# Patient Record
Sex: FEMALE | Race: WHITE | NOT HISPANIC OR LATINO | Employment: FULL TIME | URBAN - METROPOLITAN AREA
[De-identification: names, ages, dates, MRNs, and addresses within clinical notes are randomized per-mention and may not be internally consistent; named-entity substitution may affect disease eponyms.]

---

## 2017-06-12 DIAGNOSIS — F41.8 DEPRESSION WITH ANXIETY: ICD-10-CM

## 2017-06-12 DIAGNOSIS — E78.00 PURE HYPERCHOLESTEROLEMIA: ICD-10-CM

## 2017-06-12 DIAGNOSIS — E89.0 HYPOTHYROIDISM, POSTSURGICAL: ICD-10-CM

## 2017-06-12 RX ORDER — LIOTHYRONINE SODIUM 5 UG/1
TABLET ORAL
Qty: 240 TAB | Refills: 0 | Status: SHIPPED | OUTPATIENT
Start: 2017-06-12 | End: 2017-07-31 | Stop reason: SDUPTHER

## 2017-06-12 RX ORDER — LEVOTHYROXINE SODIUM 0.15 MG/1
TABLET ORAL
Qty: 30 TAB | Refills: 0 | Status: SHIPPED | OUTPATIENT
Start: 2017-06-12 | End: 2017-07-31 | Stop reason: SDUPTHER

## 2017-06-13 NOTE — TELEPHONE ENCOUNTER
Please let patient know that she is due for lab and follow up in clinic. I will refill 30 days supply on Levothyroxine and Liothyronine. Please advise her to do fasting lab and follow up in clinic. We may need to adjust the dose of medications based on her recent blood tests.    I replaced the fasting blood tests on 17 as her previous blood tests orders are .     Thanks!    Lavern Solis M.D.

## 2017-06-13 NOTE — TELEPHONE ENCOUNTER
Phone Number Called: [phone] (home)     Message: called pt. Notified of information below, pt will call to schedule appt. After she has blood work done.     Left Message for patient to call back: no

## 2017-07-10 RX ORDER — LIOTHYRONINE SODIUM 5 UG/1
TABLET ORAL
Qty: 240 TAB | Refills: 0 | OUTPATIENT
Start: 2017-07-10

## 2017-07-10 NOTE — TELEPHONE ENCOUNTER
Refill declined. Please advise her to do fasting blood tests before follow up. Lab was reordered on 6/12/17.   Please advise pt and pharmacy.  She is past due for labs and appt.    Lavern Solis MD  [facility] [address] [place] [address]

## 2017-07-10 NOTE — TELEPHONE ENCOUNTER
Phone Number Called: [phone] (home)     Message: called pt left message for pt to call back.     Left Message for patient to call back: no

## 2017-07-14 ENCOUNTER — HOSPITAL ENCOUNTER (OUTPATIENT)
Dept: LAB | Facility: MEDICAL CENTER | Age: 52
End: 2017-07-14
Attending: INTERNAL MEDICINE
Payer: COMMERCIAL

## 2017-07-14 DIAGNOSIS — F41.8 DEPRESSION WITH ANXIETY: ICD-10-CM

## 2017-07-14 DIAGNOSIS — E78.00 PURE HYPERCHOLESTEROLEMIA: ICD-10-CM

## 2017-07-14 DIAGNOSIS — E89.0 HYPOTHYROIDISM, POSTSURGICAL: ICD-10-CM

## 2017-07-14 LAB
ALBUMIN SERPL BCP-MCNC: 4.2 G/DL (ref 3.2–4.9)
ALBUMIN/GLOB SERPL: 1.4 G/DL
ALP SERPL-CCNC: 50 U/L (ref 30–99)
ALT SERPL-CCNC: 17 U/L (ref 2–50)
ANION GAP SERPL CALC-SCNC: 8 MMOL/L (ref 0–11.9)
AST SERPL-CCNC: 17 U/L (ref 12–45)
BASOPHILS # BLD AUTO: 1.4 % (ref 0–1.8)
BASOPHILS # BLD: 0.08 K/UL (ref 0–0.12)
BILIRUB SERPL-MCNC: 0.7 MG/DL (ref 0.1–1.5)
BUN SERPL-MCNC: 12 MG/DL (ref 8–22)
CALCIUM SERPL-MCNC: 9.1 MG/DL (ref 8.5–10.5)
CHLORIDE SERPL-SCNC: 104 MMOL/L (ref 96–112)
CHOLEST SERPL-MCNC: 235 MG/DL (ref 100–199)
CO2 SERPL-SCNC: 24 MMOL/L (ref 20–33)
CREAT SERPL-MCNC: 0.75 MG/DL (ref 0.5–1.4)
EOSINOPHIL # BLD AUTO: 0.14 K/UL (ref 0–0.51)
EOSINOPHIL NFR BLD: 2.4 % (ref 0–6.9)
ERYTHROCYTE [DISTWIDTH] IN BLOOD BY AUTOMATED COUNT: 39.8 FL (ref 35.9–50)
GFR SERPL CREATININE-BSD FRML MDRD: >60 ML/MIN/1.73 M 2
GLOBULIN SER CALC-MCNC: 3 G/DL (ref 1.9–3.5)
GLUCOSE SERPL-MCNC: 101 MG/DL (ref 65–99)
HCT VFR BLD AUTO: 44.3 % (ref 37–47)
HDLC SERPL-MCNC: 48 MG/DL
HGB BLD-MCNC: 14.5 G/DL (ref 12–16)
IMM GRANULOCYTES # BLD AUTO: 0.03 K/UL (ref 0–0.11)
IMM GRANULOCYTES NFR BLD AUTO: 0.5 % (ref 0–0.9)
LDLC SERPL CALC-MCNC: 163 MG/DL
LYMPHOCYTES # BLD AUTO: 1.54 K/UL (ref 1–4.8)
LYMPHOCYTES NFR BLD: 26.1 % (ref 22–41)
MCH RBC QN AUTO: 27 PG (ref 27–33)
MCHC RBC AUTO-ENTMCNC: 32.7 G/DL (ref 33.6–35)
MCV RBC AUTO: 82.3 FL (ref 81.4–97.8)
MONOCYTES # BLD AUTO: 0.47 K/UL (ref 0–0.85)
MONOCYTES NFR BLD AUTO: 8 % (ref 0–13.4)
NEUTROPHILS # BLD AUTO: 3.63 K/UL (ref 2–7.15)
NEUTROPHILS NFR BLD: 61.6 % (ref 44–72)
NRBC # BLD AUTO: 0 K/UL
NRBC BLD AUTO-RTO: 0 /100 WBC
PLATELET # BLD AUTO: 298 K/UL (ref 164–446)
PMV BLD AUTO: 10.5 FL (ref 9–12.9)
POTASSIUM SERPL-SCNC: 3.9 MMOL/L (ref 3.6–5.5)
PROT SERPL-MCNC: 7.2 G/DL (ref 6–8.2)
RBC # BLD AUTO: 5.38 M/UL (ref 4.2–5.4)
SODIUM SERPL-SCNC: 136 MMOL/L (ref 135–145)
T4 FREE SERPL-MCNC: <0.25 NG/DL (ref 0.53–1.43)
TRIGL SERPL-MCNC: 119 MG/DL (ref 0–149)
TSH SERPL DL<=0.005 MIU/L-ACNC: 5.74 UIU/ML (ref 0.3–3.7)
WBC # BLD AUTO: 5.9 K/UL (ref 4.8–10.8)

## 2017-07-14 PROCEDURE — 80053 COMPREHEN METABOLIC PANEL: CPT

## 2017-07-14 PROCEDURE — 85025 COMPLETE CBC W/AUTO DIFF WBC: CPT

## 2017-07-14 PROCEDURE — 80061 LIPID PANEL: CPT

## 2017-07-14 PROCEDURE — 36415 COLL VENOUS BLD VENIPUNCTURE: CPT

## 2017-07-14 PROCEDURE — 84439 ASSAY OF FREE THYROXINE: CPT

## 2017-07-14 PROCEDURE — 84443 ASSAY THYROID STIM HORMONE: CPT

## 2017-07-17 ENCOUNTER — APPOINTMENT (OUTPATIENT)
Dept: MEDICAL GROUP | Age: 52
End: 2017-07-17
Payer: COMMERCIAL

## 2017-07-31 ENCOUNTER — OFFICE VISIT (OUTPATIENT)
Dept: MEDICAL GROUP | Age: 52
End: 2017-07-31
Payer: COMMERCIAL

## 2017-07-31 VITALS
HEIGHT: 69 IN | TEMPERATURE: 98.1 F | WEIGHT: 273 LBS | DIASTOLIC BLOOD PRESSURE: 74 MMHG | OXYGEN SATURATION: 96 % | SYSTOLIC BLOOD PRESSURE: 126 MMHG | BODY MASS INDEX: 40.43 KG/M2 | HEART RATE: 69 BPM

## 2017-07-31 DIAGNOSIS — E89.0 HYPOTHYROIDISM, POSTSURGICAL: ICD-10-CM

## 2017-07-31 DIAGNOSIS — F41.8 DEPRESSION WITH ANXIETY: ICD-10-CM

## 2017-07-31 DIAGNOSIS — Z91.199 NONCOMPLIANCE: ICD-10-CM

## 2017-07-31 DIAGNOSIS — Z12.11 SCREENING FOR COLON CANCER: ICD-10-CM

## 2017-07-31 DIAGNOSIS — E66.01 MORBID OBESITY WITH BMI OF 40.0-44.9, ADULT (HCC): ICD-10-CM

## 2017-07-31 DIAGNOSIS — Z12.31 VISIT FOR SCREENING MAMMOGRAM: ICD-10-CM

## 2017-07-31 DIAGNOSIS — R73.01 IFG (IMPAIRED FASTING GLUCOSE): ICD-10-CM

## 2017-07-31 DIAGNOSIS — E78.00 PURE HYPERCHOLESTEROLEMIA: ICD-10-CM

## 2017-07-31 PROCEDURE — 99214 OFFICE O/P EST MOD 30 MIN: CPT | Performed by: INTERNAL MEDICINE

## 2017-07-31 RX ORDER — LEVOTHYROXINE SODIUM 175 UG/1
175 TABLET ORAL
Qty: 30 TAB | Refills: 6 | Status: SHIPPED | OUTPATIENT
Start: 2017-07-31 | End: 2018-09-17

## 2017-07-31 RX ORDER — BUPROPION HYDROCHLORIDE 150 MG/1
150 TABLET ORAL 2 TIMES DAILY
Qty: 60 TAB | Refills: 6 | Status: SHIPPED | OUTPATIENT
Start: 2017-07-31 | End: 2020-12-31 | Stop reason: SDUPTHER

## 2017-07-31 RX ORDER — LIOTHYRONINE SODIUM 5 UG/1
TABLET ORAL
Qty: 150 TAB | Refills: 6 | Status: SHIPPED | OUTPATIENT
Start: 2017-07-31 | End: 2018-05-07

## 2017-07-31 ASSESSMENT — PAIN SCALES - GENERAL: PAINLEVEL: NO PAIN

## 2017-07-31 ASSESSMENT — PATIENT HEALTH QUESTIONNAIRE - PHQ9: CLINICAL INTERPRETATION OF PHQ2 SCORE: 0

## 2017-07-31 NOTE — ASSESSMENT & PLAN NOTE
Patient is taking Wellbutrin  mg twice a day. She stated that her mood is well controlled with current regimens. She denies side effects from taking it. She denies suicide ideation or plan or homicidal ideation or plan. She requested to refill her thyroid medications and Wellbutrin.

## 2017-07-31 NOTE — ASSESSMENT & PLAN NOTE
Patient gain weight about 10 pounds, compared to last year. She stated that she eats very healthy and she exercises irregularly. We discussed healthy diet and regular exercise. I also offered her to see nutritionist. However patient declined to refer to nutritionist or weight management program.

## 2017-07-31 NOTE — PROGRESS NOTES
Subjective:   Brielle Krishnamurthy is a 51 y.o. female here today for evaluation and management of:      Pure hypercholesterolemia  Patient has high total cholesterol and LDL cholesterol. She stated that her cholesterol is high because of the hypothyroid. She is not interested to be treated with any cholesterol medication. She tried to exercise, but not very regular. She also states that she eats very healthy. She wants to control her cholesterol with diet and exercise only. I reviewed her recent blood tests with her today.    Results for BRIELLE STEWARD (MRN [id number]) as of 7/31/2017 08:15   Ref. Range 3/4/2015 07:45 7/14/2017 09:33   Cholesterol,Tot Latest Ref Range: 100-199 mg/dL 227 (H) 235 (H)   Triglycerides Latest Ref Range: 0-149 mg/dL 53 119   HDL Latest Ref Range: >=40 mg/dL 56 48   LDL Latest Ref Range: <100 mg/dL 160 (H) 163 (H)       Morbid obesity with BMI of 40.0-44.9, adult (HCC)  Patient gain weight about 10 pounds, compared to last year. She stated that she eats very healthy and she exercises irregularly. We discussed healthy diet and regular exercise. I also offered her to see nutritionist. However patient declined to refer to nutritionist or weight management program.    Hypothyroidism, postsurgical  Patient is taking levothyroxine 150 µg every morning and Cytomel 5 micrograms 5 tablets a day. She states that she takes all thyroid medication fairly regularly. She denies side effects from taking levothyroxine and Cytomel. She has slightly elevated TSH and low free T4. I reviewed her recent thyroid function tests with her.  She reported gaining weight and feeling tired.    Results for BRIELLE STEWARD (MRN [id number]) as of 7/31/2017 08:15   Ref. Range 3/4/2015 07:45 7/14/2017 09:33   TSH Latest Ref Range: 0.300-3.700 uIU/mL 24.290 (H) 5.740 (H)   Free T-4 Latest Ref Range: 0.53-1.43 ng/dL 0.60 <0.25 (L)       Depression with anxiety  Patient is taking Wellbutrin  mg twice a day. She  "stated that her mood is well controlled with current regimens. She denies side effects from taking it. She denies suicide ideation or plan or homicidal ideation or plan. She requested to refill her thyroid medications and Wellbutrin.    Noncompliance  Patient is noncompliance with follow-up, blood test and medications.            Current medicines (including changes today)  Current Outpatient Prescriptions   Medication Sig Dispense Refill   • levothyroxine (SYNTHROID) 175 MCG Tab Take 1 Tab by mouth Every morning on an empty stomach. 30 Tab 6   • buPROPion (WELLBUTRIN XL) 150 MG XL tablet Take 1 Tab by mouth 2 Times a Day. TWICE DAILY 60 Tab 6   • liothyronine (CYTOMEL) 5 MCG Tab Take 2 tabs in am and 3 tabs in pm. 150 Tab 6   • vitamin A 49960 UNIT capsule Take 10,000 Units by mouth every day.     • Calcium Carbonate-Vit D-Min (CALCIUM 1200 PO) Take  by mouth every day.     • Magnesium 400 MG CAPS Take  by mouth every day.     • Cholecalciferol (VITAMIN D-3) 5000 UNITS TABS Take 2 Tabs by mouth every day.       No current facility-administered medications for this visit.     She  has a past medical history of HTN (hypertension) (07-29-11); Anemia; Other specified symptom associated with female genital organs; Clostridium difficile infection (2011); Other specified disorder of intestines; and Hypothyroidism, postsurgical (2011). She also has no past medical history of Breast cancer (CMS-HCC) or Anesthesia.    ROS   No chest pain, no shortness of breath, no abdominal pain       Objective:     Blood pressure 126/74, pulse 69, temperature 36.7 °C (98.1 °F), height 1.753 m (5' 9\"), weight 123.832 kg (273 lb), SpO2 96 %, not currently breastfeeding. Body mass index is 40.3 kg/(m^2).   Physical Exam:  General: Alert, oriented and no acute distress.  Eye contact is good, speech goal directed, affect calm  HEENT: conjunctiva non-injected, sclera non-icteric.  Oral mucous membranes pink and moist with no lesions.  Pinna " normal.   Lungs: Normal respiratory effort, clear to auscultation bilaterally with good excursion.  CV: regular rate and rhythm. No murmurs.  Abdomen: soft, non distended, nontender, Bowel sound normal.  Ext: no edema, color normal, vascularity normal, temperature normal        Assessment and Plan:   The following treatment plan was discussed     1. Pure hypercholesterolemia  - Not on medications. Patient does not want to take any prescription medication. She wants to control her cholesterol with diet and exercise as she can.   - We discussed to try over-the-counter red yeast rice, and omega-3.   - Advised to eat low fat, low carbohydrate and high fiber diet as well as do cardio physical exercise regularly.   - COMP METABOLIC PANEL; Future  - LIPID PROFILE; Future    2. Hypothyroidism, postsurgical  - Not well controlled. Increase levothyroxine from 150 µg to 175 µg every morning and continue Cytomel 5 µg, 5 tablets daily. Reviewed the potential side effect of levothyroxine cytoma with patient.  - Recheck labs in 3 months  - levothyroxine (SYNTHROID) 175 MCG Tab; Take 1 Tab by mouth Every morning on an empty stomach.  Dispense: 30 Tab; Refill: 6  - liothyronine (CYTOMEL) 5 MCG Tab; Take 2 tabs in am and 3 tabs in pm.  Dispense: 150 Tab; Refill: 6  - COMP METABOLIC PANEL; Future  - TSH; Future  - FREE THYROXINE; Future    3. Depression with anxiety  - Well-controlled. Patient wants to refill Wellbutrin today.  - Discussed with patient regarding the use and side effects of medication as well as black box warning of suicidality risk with medication. Patient verbally understands. Recommend to call 911 or go to ER if patient has suicidal ideation or plan.   - buPROPion (WELLBUTRIN XL) 150 MG XL tablet; Take 1 Tab by mouth 2 Times a Day. TWICE DAILY  Dispense: 60 Tab; Refill: 6    4. Morbid obesity with BMI of 40.0-44.9, adult (CMS-[facility])  - Counseled for healthy diet and regular physical exercise to lose weight.   -  Patient declined to refer to nutritionist and weight management program    5. Visit for screening mammogram  - Patient is overdue for screening mammogram. Order screening mammogram  - MA-SCREEN MAMMO W/CAD-BILAT; Future    6. IFG (impaired fasting glucose)  - Discussed to eat low carbohydrate and low sugar diet. Encourage regular physical exercise. Patient has family history of diabetes on her aunt.  - COMP METABOLIC PANEL; Future  - HEMOGLOBIN A1C; Future    7. Screening for colon cancer  - Refer to GI for colon cancer screening.  - REFERRAL TO GASTROENTEROLOGY    8. Noncompliance  - Counseling to compliant with medication, diet and exercise as well as blood tests and follow-up.    9. Health maintenance:  - Discussed to stop doing Pap smear as patient had complete hysterectomy in 2014 for uterine fibroids and heavy bleeding, and patient stated that she had normal Pap smear all the time in the past. Patient agreed to stop doing Pap smear. Patient is advised to do annual women health and pelvic exam. She will follow with gynecologist for pelvic exam. Patient is also advised to seek urgent medical attention if she has any lesions on vaginal or genitalia or vaginal discharge or bleeding or spotting. Patient agrees with the plan.  - Ordered annual screening mammogram.  - Referred to GI for screening colon cancer.      Followup: Return in about 3 months (around 10/31/2017), or if symptoms worsen or fail to improve, for hypothyroid, depression with anxiety, hypercholesterolemia, obesity, lab review.      Please note that this dictation was created using voice recognition software. I have made every reasonable attempt to correct obvious errors, but I expect that there may have unintended errors in text, spelling, punctuation, or grammar that I did not discover.

## 2017-07-31 NOTE — ASSESSMENT & PLAN NOTE
Patient is taking levothyroxine 150 µg every morning and Cytomel 5 micrograms 5 tablets a day. She states that she takes all thyroid medication fairly regularly. She denies side effects from taking levothyroxine and Cytomel. She has slightly elevated TSH and low free T4. I reviewed her recent thyroid function tests with her.  She reported gaining weight and feeling tired.    Results for NADEEM STEWARD (MRN [id number]) as of 7/31/2017 08:15   Ref. Range 3/4/2015 07:45 7/14/2017 09:33   TSH Latest Ref Range: 0.300-3.700 uIU/mL 24.290 (H) 5.740 (H)   Free T-4 Latest Ref Range: 0.53-1.43 ng/dL 0.60 <0.25 (L)

## 2017-07-31 NOTE — MR AVS SNAPSHOT
"        Brielle Estrada Anisha   2017 8:00 AM   Office Visit   MRN: [id number]    Department:  [address] [place] [address]   Dept Phone:  [phone]    Description:  Female : 1965   Provider:  Lavern Solis M.D.           Reason for Visit     Hypothyroidism lab review      Allergies as of 2017     No Known Allergies      You were diagnosed with     Pure hypercholesterolemia   [272.0.ICD-9-CM]       Hypothyroidism, postsurgical   [408693]       Depression with anxiety   [459222]       Morbid obesity with BMI of 40.0-44.9, adult (CMS-[facility])   [518844]       Visit for screening mammogram   [180928]       IFG (impaired fasting glucose)   [941061]       Screening for colon cancer   [911148]         Vital Signs     Blood Pressure Pulse Temperature Height Weight Body Mass Index    126/74 mmHg 69 36.7 °C (98.1 °F) 1.753 m (5' 9\") 123.832 kg (273 lb) 40.30 kg/m2    Oxygen Saturation Breastfeeding? Smoking Status             96% No Never Smoker          Basic Information     Date Of Birth Sex Race Ethnicity Preferred Language    1965 Female White Non- English      Your appointments     Oct 30, 2017  8:00 AM   Established Patient with Lavern Solis M.D.   [address] [address] [phone]           You will be receiving a confirmation call a few days before your appointment from our automated call confirmation system.              Problem List              ICD-10-CM Priority Class Noted - Resolved    Cervical radiculopathy M54.12   2012 - Present    Hypothyroidism, postsurgical E89.0   Unknown - Present    History of chlamydia infection Z86.19   2012 - Present    Depression with anxiety F41.8   2013 - Present    Intramural leiomyoma of uterus D25.1   2014 - Present    Pure hypercholesterolemia E78.00   3/13/2016 - Present    Morbid obesity with BMI of 40.0-44.9, adult ([facility]) E66.01, Z68.41   2017 - Present      "   Health Maintenance        Date Due Completion Dates    IMM DTaP/Tdap/Td Vaccine (1 - Tdap) 10/13/2011 10/12/2011    MAMMOGRAM 7/22/2012 7/22/2011    COLONOSCOPY 8/23/2016 ---    IMM INFLUENZA (1) 9/1/2017 ---            Current Immunizations     TD Vaccine 10/12/2011  6:17 PM      Below and/or attached are the medications your provider expects you to take. Review all of your home medications and newly ordered medications with your provider and/or pharmacist. Follow medication instructions as directed by your provider and/or pharmacist. Please keep your medication list with you and share with your provider. Update the information when medications are discontinued, doses are changed, or new medications (including over-the-counter products) are added; and carry medication information at all times in the event of emergency situations     Allergies:  No Known Allergies          Medications  Valid as of: July 31, 2017 -  8:20 AM    Generic Name Brand Name Tablet Size Instructions for use    BuPROPion HCl (TABLET SR 24 HR) WELLBUTRIN  MG Take 1 Tab by mouth 2 Times a Day. TWICE DAILY        Calcium Carbonate-Vit D-Min   Take  by mouth every day.        Cholecalciferol (Tab) Vitamin D-3 5000 UNITS Take 2 Tabs by mouth every day.        Levothyroxine Sodium (Tab) SYNTHROID 175 MCG Take 1 Tab by mouth Every morning on an empty stomach.        Liothyronine Sodium (Tab) CYTOMEL 5 MCG Take 2 tabs in am and 3 tabs in pm.        Magnesium (Cap) Magnesium 400 MG Take  by mouth every day.        Vitamin A (Cap) vitamin A 87766 UNIT Take 10,000 Units by mouth every day.        .                 Medicines prescribed today were sent to:     [id number] - [place] - [address] [address] [facility] AT [facility]    [address] [address]    Phone: [phone] Fax: [phone]    Open 24 Hours?: No      Medication refill instructions:       If your prescription bottle indicates you  have medication refills left, it is not necessary to call your provider’s office. Please contact your pharmacy and they will refill your medication.    If your prescription bottle indicates you do not have any refills left, you may request refills at any time through one of the following ways: The online [employer] system (except Urgent Care), by calling your provider’s office, or by asking your pharmacy to contact your provider’s office with a refill request. Medication refills are processed only during regular business hours and may not be available until the next business day. Your provider may request additional information or to have a follow-up visit with you prior to refilling your medication.   *Please Note: Medication refills are assigned a new Rx number when refilled electronically. Your pharmacy may indicate that no refills were authorized even though a new prescription for the same medication is available at the pharmacy. Please request the medicine by name with the pharmacy before contacting your provider for a refill.        Your To Do List     Future Labs/Procedures Complete By Expires    COMP METABOLIC PANEL  As directed 8/1/2018    FREE THYROXINE  As directed 8/1/2018    HEMOGLOBIN A1C  As directed 8/1/2018    LIPID PROFILE  As directed 8/1/2018    MA-SCREEN MAMMO W/CAD-BILAT  As directed 9/1/2018    TSH  As directed 8/1/2018      Referral     A referral request has been sent to our patient care coordination department. Please allow 3-5 business days for us to process this request and contact you either by phone or mail. If you do not hear from us by the 5th business day, please call us at [phone].           [employer] Access Code: Activation code not generated  Current [employer] Status: Active

## 2017-07-31 NOTE — ASSESSMENT & PLAN NOTE
Patient has high total cholesterol and LDL cholesterol. She stated that her cholesterol is high because of the hypothyroid. She is not interested to be treated with any cholesterol medication. She tried to exercise, but not very regular. She also states that she eats very healthy. She wants to control her cholesterol with diet and exercise only. I reviewed her recent blood tests with her today.    Results for NADEEM STEWARD (MRN [id number]) as of 7/31/2017 08:15   Ref. Range 3/4/2015 07:45 7/14/2017 09:33   Cholesterol,Tot Latest Ref Range: 100-199 mg/dL 227 (H) 235 (H)   Triglycerides Latest Ref Range: 0-149 mg/dL 53 119   HDL Latest Ref Range: >=40 mg/dL 56 48   LDL Latest Ref Range: <100 mg/dL 160 (H) 163 (H)

## 2018-04-20 ENCOUNTER — OFFICE VISIT (OUTPATIENT)
Dept: MEDICAL GROUP | Facility: PHYSICIAN GROUP | Age: 53
End: 2018-04-20
Payer: COMMERCIAL

## 2018-04-20 ENCOUNTER — HOSPITAL ENCOUNTER (EMERGENCY)
Facility: MEDICAL CENTER | Age: 53
End: 2018-04-21
Attending: EMERGENCY MEDICINE
Payer: COMMERCIAL

## 2018-04-20 ENCOUNTER — HOSPITAL ENCOUNTER (OUTPATIENT)
Dept: LAB | Facility: MEDICAL CENTER | Age: 53
End: 2018-04-20
Attending: NURSE PRACTITIONER
Payer: COMMERCIAL

## 2018-04-20 VITALS
WEIGHT: 278 LBS | DIASTOLIC BLOOD PRESSURE: 100 MMHG | TEMPERATURE: 98.8 F | HEIGHT: 69 IN | HEART RATE: 80 BPM | BODY MASS INDEX: 41.18 KG/M2 | RESPIRATION RATE: 18 BRPM | SYSTOLIC BLOOD PRESSURE: 154 MMHG | OXYGEN SATURATION: 98 %

## 2018-04-20 VITALS
WEIGHT: 275.57 LBS | HEART RATE: 91 BPM | TEMPERATURE: 98.2 F | OXYGEN SATURATION: 94 % | DIASTOLIC BLOOD PRESSURE: 87 MMHG | RESPIRATION RATE: 16 BRPM | SYSTOLIC BLOOD PRESSURE: 140 MMHG | BODY MASS INDEX: 40.82 KG/M2 | HEIGHT: 69 IN

## 2018-04-20 DIAGNOSIS — R04.0 BLEEDING NOSE: ICD-10-CM

## 2018-04-20 DIAGNOSIS — I10 ESSENTIAL HYPERTENSION: ICD-10-CM

## 2018-04-20 DIAGNOSIS — R04.0 EPISTAXIS: ICD-10-CM

## 2018-04-20 DIAGNOSIS — E89.0 HYPOTHYROIDISM, POSTSURGICAL: ICD-10-CM

## 2018-04-20 LAB
BASOPHILS # BLD AUTO: 1.4 % (ref 0–1.8)
BASOPHILS # BLD: 0.11 K/UL (ref 0–0.12)
EOSINOPHIL # BLD AUTO: 0.13 K/UL (ref 0–0.51)
EOSINOPHIL NFR BLD: 1.7 % (ref 0–6.9)
ERYTHROCYTE [DISTWIDTH] IN BLOOD BY AUTOMATED COUNT: 41.9 FL (ref 35.9–50)
HCT VFR BLD AUTO: 43 % (ref 37–47)
HGB BLD-MCNC: 13.8 G/DL (ref 12–16)
IMM GRANULOCYTES # BLD AUTO: 0.03 K/UL (ref 0–0.11)
IMM GRANULOCYTES NFR BLD AUTO: 0.4 % (ref 0–0.9)
LYMPHOCYTES # BLD AUTO: 2.03 K/UL (ref 1–4.8)
LYMPHOCYTES NFR BLD: 26.2 % (ref 22–41)
MCH RBC QN AUTO: 26.8 PG (ref 27–33)
MCHC RBC AUTO-ENTMCNC: 32.1 G/DL (ref 33.6–35)
MCV RBC AUTO: 83.7 FL (ref 81.4–97.8)
MONOCYTES # BLD AUTO: 0.42 K/UL (ref 0–0.85)
MONOCYTES NFR BLD AUTO: 5.4 % (ref 0–13.4)
NEUTROPHILS # BLD AUTO: 5.02 K/UL (ref 2–7.15)
NEUTROPHILS NFR BLD: 64.9 % (ref 44–72)
NRBC # BLD AUTO: 0 K/UL
NRBC BLD-RTO: 0 /100 WBC
PLATELET # BLD AUTO: 329 K/UL (ref 164–446)
PMV BLD AUTO: 10.4 FL (ref 9–12.9)
RBC # BLD AUTO: 5.14 M/UL (ref 4.2–5.4)
WBC # BLD AUTO: 7.7 K/UL (ref 4.8–10.8)

## 2018-04-20 PROCEDURE — 84439 ASSAY OF FREE THYROXINE: CPT

## 2018-04-20 PROCEDURE — 85025 COMPLETE CBC W/AUTO DIFF WBC: CPT

## 2018-04-20 PROCEDURE — 99283 EMERGENCY DEPT VISIT LOW MDM: CPT

## 2018-04-20 PROCEDURE — 84443 ASSAY THYROID STIM HORMONE: CPT

## 2018-04-20 PROCEDURE — 99214 OFFICE O/P EST MOD 30 MIN: CPT | Performed by: NURSE PRACTITIONER

## 2018-04-20 PROCEDURE — 303620 HCHG EPISTAXIS CONTROL

## 2018-04-20 PROCEDURE — 36415 COLL VENOUS BLD VENIPUNCTURE: CPT

## 2018-04-20 RX ORDER — CEPHALEXIN 500 MG/1
500 TABLET ORAL EVERY 6 HOURS
Qty: 20 TAB | Refills: 0 | Status: SHIPPED | OUTPATIENT
Start: 2018-04-20 | End: 2018-04-20

## 2018-04-20 RX ORDER — CEPHALEXIN 500 MG/1
500 TABLET ORAL EVERY 6 HOURS
Qty: 20 TAB | Refills: 0 | Status: SHIPPED | OUTPATIENT
Start: 2018-04-20 | End: 2018-07-09

## 2018-04-20 RX ORDER — LISINOPRIL 20 MG/1
20 TABLET ORAL DAILY
Qty: 30 TAB | Refills: 0 | Status: SHIPPED | OUTPATIENT
Start: 2018-04-20 | End: 2018-05-17 | Stop reason: SDUPTHER

## 2018-04-20 ASSESSMENT — PAIN SCALES - GENERAL: PAINLEVEL: NO PAIN

## 2018-04-20 NOTE — ASSESSMENT & PLAN NOTE
"This is a new problem. States she did have 1 about 1 year ago. States that 2 weeks ago she had one on her way home from work. States weds and thurs she had a nose bleed that lasted over 1 hour. 3:30 pm had another nosebleed. Which lasted an hour. States she continued feeling drips, states there was \"a lot of blood\" with large clots. States she did have some dizziness, States she has been feeling congested. Denies dryness, does have allergies states that she does get sore throat and hoarse voice yearly. States when she noticed the bleeding she stopped caffeine, iburpofen, cold and sinus medications. States it has not changed her symptoms. Sates she has been staying hydrated. States she has felt flushed, but no fevers, overall feels well. States it has been the left side consistently. Did notice blood dripping from the left side. States she has not had any dizziness between episodes. No family history of blood clotting disorder, no easy bruising.   "

## 2018-04-20 NOTE — PROGRESS NOTES
"Chief Complaint   Patient presents with   • Epistaxis     04/07/18 started had 14 since        HISTORY OF PRESENT ILLNESS: Patient is a 52 y.o. female established patient who presents today to discuss nose bleeds.    Bleeding nose  This is a new problem. States she did have 1 about 1 year ago. States that 2 weeks ago she had one on her way home from work. States weds and thurs she had a nose bleed that lasted over 1 hour. 3:30 pm had another nosebleed. Which lasted an hour. States she continued feeling drips, states there was \"a lot of blood\" with large clots. States she did have some dizziness, States she has been feeling congested. Denies dryness, does have allergies states that she does get sore throat and hoarse voice yearly. States when she noticed the bleeding she stopped caffeine, iburpofen, cold and sinus medications. States it has not changed her symptoms. Sates she has been staying hydrated. States she has felt flushed, but no fevers, overall feels well. States it has been the left side consistently. Did notice blood dripping from the left side. States she has not had any dizziness between episodes. No family history of blood clotting disorder, no easy bruising.     Essential hypertension  This is a new problem. Patient apparently has been diagnosed with us in the past and has been on lisinopril patient denies this history. Patient blood pressure today is 154/100, on recheck it is 160/110. This time is to restart patient on lisinopril 20 mg daily patient will follow-up in 2 weeks. Patient denies chest pain, palpitations, dizziness, blurry vision. States that she is noticing headaches recently, new onset of bloody noses.      Patient Active Problem List    Diagnosis Date Noted   • Bleeding nose 04/20/2018   • Essential hypertension 04/20/2018   • Morbid obesity with BMI of 40.0-44.9, adult (HCC) 07/31/2017   • Noncompliance 07/31/2017   • Pure hypercholesterolemia 03/13/2016   • Intramural leiomyoma of " uterus 06/16/2014   • Depression with anxiety 11/18/2013   • History of chlamydia infection 11/27/2012   • Hypothyroidism, postsurgical    • Cervical radiculopathy 07/25/2012       Allergies:Patient has no known allergies.    Current Outpatient Prescriptions   Medication Sig Dispense Refill   • lisinopril (PRINIVIL) 20 MG Tab Take 1 Tab by mouth every day. 30 Tab 0   • levothyroxine (SYNTHROID) 175 MCG Tab Take 1 Tab by mouth Every morning on an empty stomach. 30 Tab 6   • buPROPion (WELLBUTRIN XL) 150 MG XL tablet Take 1 Tab by mouth 2 Times a Day. TWICE DAILY 60 Tab 6   • liothyronine (CYTOMEL) 5 MCG Tab Take 2 tabs in am and 3 tabs in pm. 150 Tab 6   • vitamin A 38544 UNIT capsule Take 10,000 Units by mouth every day.     • Calcium Carbonate-Vit D-Min (CALCIUM 1200 PO) Take  by mouth every day.     • Magnesium 400 MG CAPS Take  by mouth every day.     • Cholecalciferol (VITAMIN D-3) 5000 UNITS TABS Take 2 Tabs by mouth every day.       No current facility-administered medications for this visit.        Social History   Substance Use Topics   • Smoking status: Never Smoker   • Smokeless tobacco: Never Used      Comment: occasional social smoker   • Alcohol use Yes      Comment: socially       Family Status   Relation Status   • Mother Alive   • Father Alive   •       Family History   Problem Relation Age of Onset   • Hypertension Mother    • Cancer     • Heart Disease     • Stroke         Review of Systems:   Constitutional:  Negative for fever, chills, weight loss and malaise/fatigue.   HEENT:  Negative for ear pain, nosebleeds, congestion, sore throat and neck pain.    Eyes:  Negative for blurred vision.   Respiratory:  Negative for cough, sputum production, shortness of breath and wheezing.    Cardiovascular:  Negative for chest pain, palpitations, orthopnea and leg swelling.   Gastrointestinal:  Negative for heartburn, nausea, vomiting and abdominal pain.   Genitourinary:  Negative for dysuria, urgency and  "frequency.   Musculoskeletal:  Negative for myalgias, back pain and joint pain.   Skin:  Negative for rash and itching.   Neurological:  Negative for dizziness, tingling, tremors, sensory change, focal weakness and positive headaches.   Endo/Heme/Allergies:  Does not bruise/bleed easily.   Psychiatric/Behavioral:  Negative for depression, suicidal ideas and memory loss.  The patient is not nervous/anxious and does not have insomnia.    All other systems reviewed and are negative except as in HPI.    Exam:  Blood pressure 154/100, pulse 80, temperature 37.1 °C (98.8 °F), resp. rate 18, height 1.753 m (5' 9\"), weight (!) 126.1 kg (278 lb), SpO2 98 %, not currently breastfeeding.  General:  Normal appearing. No distress.  HEENT:  Normocephalic. Eyes conjunctiva clear lids without ptosis, pupils equal and reactive to light accommodation, ears normal shape and contour, canals are clear bilaterally, tympanic membranes are benign, nasal mucosa benign, oropharynx is without erythema, edema or exudates.   Pulmonary:  Clear to ausculation.  Normal effort. No rales, ronchi, or wheezing.  Cardiovascular:  Regular rate and rhythm without murmur. Carotid and radial pulses are intact and equal bilaterally.  Neurologic:  Grossly nonfocal  Lymph:  No cervical, supraclavicular or axillary lymph nodes are palpable  Skin:  Warm and dry.  No obvious lesions.  Musculoskeletal:  Normal gait. No extremity cyanosis, clubbing, or edema.  Psych:  Normal mood and affect. Alert and oriented x3. Judgment and insight is normal.      PLAN:    1. Bleeding nose  Patient will complete lab work, is referred to ENT per patient request patient is encouraged to be seen in the emergency room for bloody nose that can use greater than 30 minutes.  - CBC WITH DIFFERENTIAL; Future  - REFERRAL TO ENT    2. Hypothyroidism, postsurgical  Plan to recheck thyroid.   - TSH+FREE T4    3. Essential hypertension  Plan to start lisinopril patient counseled regarding " risks, benefits, side effects. Patient will follow-up in 2 weeks.  - lisinopril (PRINIVIL) 20 MG Tab; Take 1 Tab by mouth every day.  Dispense: 30 Tab; Refill: 0    Follow-up in 2 weeks or sooner as needed. Patient is encouraged to be seen in the emergency room for chest pain, palpitations, shortness of breath, dizziness, severe abdominal pain or other concerning symptoms.    Please note that this dictation was created using voice recognition software. I have made every reasonable attempt to correct obvious errors, but I expect that there are errors of grammar and possibly content that I did not discover before finalizing the note.    Assessment/Plan:  1. Bleeding nose  CBC WITH DIFFERENTIAL    REFERRAL TO ENT   2. Hypothyroidism, postsurgical  TSH+FREE T4   3. Essential hypertension  lisinopril (PRINIVIL) 20 MG Tab          I have placed the below orders and discussed them with an approved delegating provider. The MA is performing the below orders under the direction of Dr. Givens.

## 2018-04-20 NOTE — LETTER
April 20, 2018        Brielle Estrada Anisha  [address]  [place] [address]        To Whom it May Concern:    Brielle is having severe nose bleeds, please excuse her from work 4/19/2018-4/23/2018.     If you have any questions or concerns, please don't hesitate to call.        Sincerely,        Fletcher Olvera, ADY.P.R.N.    Electronically Signed

## 2018-04-20 NOTE — ASSESSMENT & PLAN NOTE
This is a new problem. Patient apparently has been diagnosed with us in the past and has been on lisinopril patient denies this history. Patient blood pressure today is 154/100, on recheck it is 160/110. This time is to restart patient on lisinopril 20 mg daily patient will follow-up in 2 weeks. Patient denies chest pain, palpitations, dizziness, blurry vision. States that she is noticing headaches recently, new onset of bloody noses.

## 2018-04-21 LAB
T4 FREE SERPL-MCNC: 0.52 NG/DL (ref 0.53–1.43)
TSH SERPL DL<=0.005 MIU/L-ACNC: 79.17 UIU/ML (ref 0.38–5.33)

## 2018-04-21 RX ORDER — OXYMETAZOLINE HYDROCHLORIDE 0.05 G/100ML
SPRAY NASAL
Status: DISCONTINUED
Start: 2018-04-21 | End: 2018-04-21 | Stop reason: HOSPADM

## 2018-04-21 RX ORDER — OXYMETAZOLINE HYDROCHLORIDE 0.05 G/100ML
2 SPRAY NASAL ONCE
Status: DISCONTINUED | OUTPATIENT
Start: 2018-04-21 | End: 2018-04-21 | Stop reason: HOSPADM

## 2018-04-21 RX ORDER — TETRACAINE HCL 10 MG/ML
INJECTION SUBARACHNOID
Status: DISCONTINUED
Start: 2018-04-21 | End: 2018-04-21 | Stop reason: HOSPADM

## 2018-04-21 RX ORDER — TETRACAINE HCL 10 MG/ML
1 INJECTION SUBARACHNOID ONCE
Status: DISCONTINUED | OUTPATIENT
Start: 2018-04-21 | End: 2018-04-21 | Stop reason: HOSPADM

## 2018-04-21 NOTE — ED NOTES
"Pt presents here with recurrent nose bleeds for the last few days. Pt saw pcp today and received \"scope.\" Pt states that pcp didn't see anything but if she were to start bleeding again to go to the ER and have it cauterized. At 530 Pt had 1 hour long nose bleed and then another one that started at 7. Pt refuses nose clamp \"because they don't work, they don't hold my nose tight enough.\" Pt denies any trauma causing the nose bleeds stating that they are spontaneous.  "

## 2018-04-21 NOTE — ED NOTES
Pt presents with recurring episodes of epistaxis for the pat 2 weeks.  Sh is referred to our ED by her PCP for F/U.

## 2018-04-21 NOTE — ED PROVIDER NOTES
ED Provider Note    CHIEF COMPLAINT  Chief Complaint   Patient presents with   • Nose Bleed       HPI  Brielle Krishnamurthy is a 52 y.o. female who presents for uncontrolled nosebleed from the left nostril for an hour and 15 minutes. Patient has had multiple brief and long duration nosebleed since April 7. No prior history of nose bleed. No recent upper respiratory infections allergies are foreign bodies. No bleeding diathesis low platelets or blood thinner use. Saw her primary physician today who could not identify the source of bleeding. CBC was sent and the patient was started on an antihypertensive due to mildly elevated blood pressures in attempt to improve bleeding    REVIEW OF SYSTEMS  Pertinent positives include: Nosebleed. Postnasal drip of blood  Pertinent negatives include: Nausea, vomiting of blood.    PAST MEDICAL HISTORY  Past Medical History:   Diagnosis Date   • Anemia    • Clostridium difficile infection 2011   • HTN (hypertension) 07-29-11    has been on Lisinopril, off at this time   • Hypothyroidism, postsurgical 2011    benign thyroid nodules; thyroidectomy   • Other specified disorder of intestines     constipation   • Other specified symptom associated with female genital organs        SOCIAL HISTORY  Social History   Substance Use Topics   • Smoking status: Never Smoker   • Smokeless tobacco: Never Used      Comment: occasional social smoker   • Alcohol use Yes      Comment: occasionally     .    CURRENT MEDICATIONS  Home Medications     Reviewed by Jose Carlos Burroughs R.N. (Registered Nurse) on 04/20/18 at 2013  Med List Status: Complete   Medication Last Dose Status   buPROPion (WELLBUTRIN XL) 150 MG XL tablet 4/20/2018 Active   Calcium Carbonate-Vit D-Min (CALCIUM 1200 PO) 4/20/2018 Active   Cholecalciferol (VITAMIN D-3) 5000 UNITS TABS 4/20/2018 Active   levothyroxine (SYNTHROID) 175 MCG Tab 4/20/2018 Active   liothyronine (CYTOMEL) 5 MCG Tab 4/20/2018 Active   lisinopril (PRINIVIL) 20 MG Tab   "Active   Magnesium 400 MG CAPS 4/20/2018 Active   vitamin A 74015 UNIT capsule 4/20/2018 Active                ALLERGIES  No Known Allergies    PHYSICAL EXAM  VITAL SIGNS: /114   Pulse (!) 106   Resp 18   Ht 1.753 m (5' 9\")   Wt 125 kg (275 lb 9.2 oz)   LMP 06/10/2014   SpO2 98%   BMI 40.70 kg/m²   Constitutional :  Well developed, Well nourished,  Blood pressure elevation, tachycardic, holding a wash rag to her nose.   HNT: Blood pooling in the base of the left nostril without obvious source. No significant blood in right nostril. Minimal postnasal drip of blood..   Ears: External ears normal.  Eyes: pupils reactive without eye discharge nor conjunctival hyperemia. Good conjunctival color  Abdomen:  Soft, nontender  Skin: Warm, dry, no erythema, no rash. No petechiae or jaundice  Musculoskeletal: no limb deformities.    LABORATORY: CBC from earlier today reviewed as below  Results for orders placed or performed during the hospital encounter of 04/20/18   CBC WITH DIFFERENTIAL   Result Value Ref Range    WBC 7.7 4.8 - 10.8 K/uL    RBC 5.14 4.20 - 5.40 M/uL    Hemoglobin 13.8 12.0 - 16.0 g/dL    Hematocrit 43.0 37.0 - 47.0 %    MCV 83.7 81.4 - 97.8 fL    MCH 26.8 (L) 27.0 - 33.0 pg    MCHC 32.1 (L) 33.6 - 35.0 g/dL    RDW 41.9 35.9 - 50.0 fL    Platelet Count 329 164 - 446 K/uL    MPV 10.4 9.0 - 12.9 fL    Neutrophils-Polys 64.90 44.00 - 72.00 %    Lymphocytes 26.20 22.00 - 41.00 %    Monocytes 5.40 0.00 - 13.40 %    Eosinophils 1.70 0.00 - 6.90 %    Basophils 1.40 0.00 - 1.80 %    Immature Granulocytes 0.40 0.00 - 0.90 %    Nucleated RBC 0.00 /100 WBC    Neutrophils (Absolute) 5.02 2.00 - 7.15 K/uL    Lymphs (Absolute) 2.03 1.00 - 4.80 K/uL    Monos (Absolute) 0.42 0.00 - 0.85 K/uL    Eos (Absolute) 0.13 0.00 - 0.51 K/uL    Baso (Absolute) 0.11 0.00 - 0.12 K/uL    Immature Granulocytes (abs) 0.03 0.00 - 0.11 K/uL    NRBC (Absolute) 0.00 K/uL         COURSE & MEDICAL DECISION MAKING  Patient treated with " oxymetolazone spray. Bleeding stopped. She has 2 small papules on the distal septum which began bleeding when I cauterized them. I pretreated the patient with topical tetracaine. They did not improve with cautery. Bleeding was moderate. I packed the anterior nose with a small Merosel packing lubricated with lidocaine jelly. Patient was observed for 15 minutes and had no resumption of bleeding.    Well-appearing patient presents with anterior epistaxis that failed nasal cautery but seems to have responded to a packing. She has borderline blood pressure elevation but no anemia despite frequent recurrent nosebleeds    PLAN:  Epistaxis handout  Packing removal ENT 5 days  Keflex 4 times a day for 5 days  Return to [facility] from control bleeding    CONDITION:  Good.    FINAL IMPRESSION:  1. Epistaxis    2.      Anterior nasal cautery failed  3.      Anterior nasal packing      Electronically signed by: Shadi Jarrett, 4/20/2018

## 2018-04-21 NOTE — DISCHARGE INSTRUCTIONS
Epistaxis (Nosebleed)    Followup with ears nose throat in 5 days to remove the packing. Take antibiotics while you have the packing in place. Return for uncontrolled bleeding. Return for fever and ill appearance.    You had a borderline or high normal blood pressure reading today.  This does not necessarily mean you have hypertension.  Please followup with your/a primary physician for comprehensive blood pressure evaluation and yearly fasting cholesterol assessment.  BP Readings from Last 3 Encounters:   04/20/18 146/114   04/20/18 154/100   07/31/17 126/74         Nosebleeds can be caused by many conditions including trauma, infections, polyps, foreign bodies, and dry mucous membranes. These conditions are may also include a dry climate, medications, and air conditioning, among other things. Most nosebleeds occur in the front of the nose.  It is because of this location that most nosebleeds can be controlled by pinching the nostrils gently and continuously. Do this for at least ten to twenty minutes. The reason for this long continuous pressure is that you must hold it long enough for the blood to clot. If during that ten to twenty minute time period, pressure is released, the process may have to be started again. The nosebleed may stop by itself, quit with pressure, need concentrated heating (cautery), or stop with pressure from packing.    HOME CARE INSTRUCTIONS  Ø If your nose was packed, try to maintain the pack inside until your caregiver removes it. If the pack starts to fall out, gently replace or cut the end off. Do not remove unless instructed.  Ø Avoid blowing your nose for 12 hours after treatment. This could dislodge the pack or clot and start bleeding again.  Ø If the bleeding starts again, sit up and bending forward, gently pinch the front half of your nose continuously for twenty minutes.  Ø If bleeding was caused by dry mucous membranes, cover the inside of your nose every morning with Vaseline or  bacitracin ointment. Use your little finger tip as an applicator. Do this as needed during dry weather. This will keep the mucous membranes moist and allow them to heal.  Ø Maintain humidity in your home by using less air conditioning or using a humidifier.  Ø Do not use aspirin or medications which make bleeding more likely. Your caregiver can give you recommendations on this.  Ø Resume normal activities as able but try to avoid straining, lifting, or bending at the waist for several days.   Ø If the nosebleeds become recurrent the cause is unknown, your caregiver may suggest laboratory tests.    seek immediate medical care if:  Ø Bleeding recurs and cannot be controlled.  Ø There is unusual bleeding from or bruising on other parts of the body.  Ø An unexplained oral temperature above 102º F (38.9º C) develops.  Ø Nosebleeds continue.  Ø There is any worsening of the condition which originally brought you in.  Ø You become light headed, feel faint, become sweaty, or vomit blood.    Document Released: 09/27/2006  Document Re-Released: 06/30/2008  [employer]® Patient Information ©2008 [employer].

## 2018-04-23 ENCOUNTER — TELEPHONE (OUTPATIENT)
Dept: MEDICAL GROUP | Facility: PHYSICIAN GROUP | Age: 53
End: 2018-04-23

## 2018-04-23 DIAGNOSIS — E89.0 HYPOTHYROIDISM, POSTSURGICAL: ICD-10-CM

## 2018-04-23 NOTE — TELEPHONE ENCOUNTER
VOICEMAIL  1. Caller Name: Brielle Krishnamurthy                        Call Back Number: [phone] (home)       2. Message: Called and left patient a message to call back to get lab results. I will also send patient mychart results. LM     3. Patient approves office to leave a detailed voicemail/MyChart message: N\A

## 2018-04-23 NOTE — TELEPHONE ENCOUNTER
----- Message from STEPHENIE Jim sent at 4/23/2018  1:03 PM PDT -----  Lab Review: Please have patient schedule follow-up to discuss. TSH is very high and T4 is decreased. Please schedule patient sooner than May 7, preferably this week.     Doc block ok.

## 2018-05-07 ENCOUNTER — OFFICE VISIT (OUTPATIENT)
Dept: MEDICAL GROUP | Facility: PHYSICIAN GROUP | Age: 53
End: 2018-05-07
Payer: COMMERCIAL

## 2018-05-07 VITALS
RESPIRATION RATE: 16 BRPM | OXYGEN SATURATION: 97 % | SYSTOLIC BLOOD PRESSURE: 124 MMHG | DIASTOLIC BLOOD PRESSURE: 86 MMHG | TEMPERATURE: 98 F | HEIGHT: 69 IN | WEIGHT: 272 LBS | HEART RATE: 81 BPM | BODY MASS INDEX: 40.29 KG/M2

## 2018-05-07 DIAGNOSIS — E89.0 HYPOTHYROIDISM, POSTSURGICAL: ICD-10-CM

## 2018-05-07 DIAGNOSIS — I10 ESSENTIAL HYPERTENSION: ICD-10-CM

## 2018-05-07 DIAGNOSIS — R53.83 FATIGUE, UNSPECIFIED TYPE: ICD-10-CM

## 2018-05-07 DIAGNOSIS — R04.0 EPISTAXIS, RECURRENT: ICD-10-CM

## 2018-05-07 DIAGNOSIS — E66.01 MORBID OBESITY WITH BMI OF 40.0-44.9, ADULT (HCC): ICD-10-CM

## 2018-05-07 PROCEDURE — 99214 OFFICE O/P EST MOD 30 MIN: CPT | Performed by: NURSE PRACTITIONER

## 2018-05-07 RX ORDER — CEPHALEXIN 500 MG/1
CAPSULE ORAL
Refills: 0 | COMMUNITY
Start: 2018-04-20 | End: 2018-07-09

## 2018-05-07 RX ORDER — LIOTHYRONINE SODIUM 25 UG/1
25 TABLET ORAL 2 TIMES DAILY
Qty: 180 TAB | Refills: 0 | Status: SHIPPED | OUTPATIENT
Start: 2018-05-07 | End: 2018-08-17 | Stop reason: SDUPTHER

## 2018-05-07 RX ORDER — TRIAMCINOLONE ACETONIDE 5 MG/G
OINTMENT TOPICAL
Refills: 3 | COMMUNITY
Start: 2018-04-26 | End: 2020-12-31

## 2018-05-07 ASSESSMENT — PAIN SCALES - GENERAL: PAINLEVEL: NO PAIN

## 2018-05-07 NOTE — ASSESSMENT & PLAN NOTE
Chronic in nature. BP today is 124/86. States lisinopril is working well at this time. States she has noticed a cough a couple times, but no other issues. No chest pain, palpitations, some SOB, states she is fatigued constantly.

## 2018-05-07 NOTE — PROGRESS NOTES
Chief Complaint   Patient presents with   • Establish Care     New Patient    • Epistaxis     saw ENT already        HISTORY OF PRESENT ILLNESS: Patient is a 52 y.o. female established patient who presents today to discuss thyroid and HTN.    Epistaxis, recurrent  This is a continued issue. States that she had one nose bleed last night, lasting a few minutes. States that steroid gel has been working well to control symptoms. States that she was seen in the ER for a greter than hour-long nose bleed. At the ER they used a sponge to control symptoms. She was then seen by ENT on 4/26. States she has follow-up in 5 weeks with ENT. No longer having clots. States ENT saw a large area of irritation.     Essential hypertension  Chronic in nature. BP today is 124/86. States lisinopril is working well at this time. States she has noticed a cough a couple times, but no other issues. No chest pain, palpitations, some SOB, states she is fatigued constantly.     Hypothyroidism, postsurgical  Patient is taking levothyroxine 150 µg every morning. States in the past she was also taking cytomel 25 mcg twice daily and states a year ago he dose had changed to 25 mcg daily. Patient states she never addressed that issue and believes this may be the reason why her TSH is at 79 and T4 is decreased. She states that she is feeling very tired has been gaining weight states that she is sleeping 4 hours every night. Patient has also noticed changes in her hair and skin.      Patient Active Problem List    Diagnosis Date Noted   • Epistaxis, recurrent 04/20/2018   • Essential hypertension 04/20/2018   • Morbid obesity with BMI of 40.0-44.9, adult (HCC) 07/31/2017   • Noncompliance 07/31/2017   • Pure hypercholesterolemia 03/13/2016   • Intramural leiomyoma of uterus 06/16/2014   • Depression with anxiety 11/18/2013   • History of chlamydia infection 11/27/2012   • Hypothyroidism, postsurgical    • Cervical radiculopathy 07/25/2012        Allergies:Patient has no known allergies.    Current Outpatient Prescriptions   Medication Sig Dispense Refill   • triamcinolone (ARISTOCORT) 0.5 % ointment ALBAN AA BID UTD  3   • cephALEXin (KEFLEX) 500 MG Cap TK 1 C PO  Q 6 H  0   • liothyronine (CYTOMEL) 25 MCG Tab Take 1 Tab by mouth 2 Times a Day. 180 Tab 0   • lisinopril (PRINIVIL) 20 MG Tab Take 1 Tab by mouth every day. 30 Tab 0   • Cephalexin 500 MG Tab Take 1 Tab by mouth every 6 hours. 20 Tab 0   • levothyroxine (SYNTHROID) 175 MCG Tab Take 1 Tab by mouth Every morning on an empty stomach. 30 Tab 6   • buPROPion (WELLBUTRIN XL) 150 MG XL tablet Take 1 Tab by mouth 2 Times a Day. TWICE DAILY 60 Tab 6   • vitamin A 09271 UNIT capsule Take 10,000 Units by mouth every day.     • Calcium Carbonate-Vit D-Min (CALCIUM 1200 PO) Take  by mouth every day.     • Magnesium 400 MG CAPS Take  by mouth every day.     • Cholecalciferol (VITAMIN D-3) 5000 UNITS TABS Take 2 Tabs by mouth every day.       No current facility-administered medications for this visit.        Social History   Substance Use Topics   • Smoking status: Never Smoker   • Smokeless tobacco: Never Used      Comment: occasional social smoker   • Alcohol use Yes      Comment: occasionally       Family Status   Relation Status   • Mother Alive   • Father Alive   •       Family History   Problem Relation Age of Onset   • Hypertension Mother    • Cancer     • Heart Disease     • Stroke         Review of Systems:   Constitutional:  Negative for fever, chills, weight loss and positive malaise/fatigue.   HEENT:  Negative for ear pain, nosebleeds, congestion, sore throat and neck pain.    Eyes:  Negative for blurred vision.     Cardiovascular:  Negative for chest pain, palpitations, orthopnea and leg swelling. .   Genitourinary:  Negative for dysuria, urgency and frequency.   Musculoskeletal:  Negative for myalgias, back pain and joint pain.   Skin:  Negative for rash and itching.   Neurological:   "Negative for dizziness, tingling, tremors, sensory change, focal weakness and headaches.   Endo/Heme/Allergies:  Does not bruise/bleed easily.   Psychiatric/Behavioral:  Negative for depression, suicidal ideas and memory loss.  The patient is not nervous/anxious and does not have insomnia.    All other systems reviewed and are negative except as in HPI.    Exam:  Blood pressure 124/86, pulse 81, temperature 36.7 °C (98 °F), resp. rate 16, height 1.753 m (5' 9\"), weight 123.4 kg (272 lb), last menstrual period 06/10/2014, SpO2 97 %, not currently breastfeeding.  General:  Normal appearing. No distress.  HEENT:  Normocephalic. Eyes conjunctiva clear lids without ptosis, pupils equal and reactive to light accommodation, ears normal shape and contour, canals are clear bilaterally, tympanic membranes are benign, nasal mucosa benign, oropharynx is without erythema, edema or exudates.   Pulmonary:  Clear to ausculation.  Normal effort. No rales, ronchi, or wheezing.  Cardiovascular:  Regular rate and rhythm without murmur. Carotid and radial pulses are intact and equal bilaterally.  Neurologic:  Grossly nonfocal  Skin:  Warm and dry.  No obvious lesions.  Musculoskeletal:  Normal gait. No extremity cyanosis, clubbing, or edema.  Psych:  Normal mood and affect. Alert and oriented x3. Judgment and insight is normal.      PLAN:    1. Epistaxis, recurrent  Follow-up with ENT.    2. Essential hypertension  Continue lisinopril 20 mg daily    3. Hypothyroidism, postsurgical  Return Cytomel to previous stable dose.  Consider follow-up with Dr. Caraballo, endocrinology.  - liothyronine (CYTOMEL) 25 MCG Tab; Take 1 Tab by mouth 2 Times a Day.  Dispense: 180 Tab; Refill: 0  - TSH+FREE T4  - TRIIODOTHYRONINE (T3)    4. Morbid obesity with BMI of 40.0-44.9, adult (HCC)  - Patient identified as having weight management issue.  Appropriate orders and counseling given.    5. Fatigue, unspecified type  Plan to check hormones to assess for " menopause.   - FSH+LH+DHEA S+PROG_E1+E2    Follow- up in 2 months and complete labs prior to that appointment. Patient is encouraged to be seen in the emergency room for chest pain, palpitations, shortness of breath, dizziness, severe abdominal pain or other concerning symptoms.    Please note that this dictation was created using voice recognition software. I have made every reasonable attempt to correct obvious errors, but I expect that there are errors of grammar and possibly content that I did not discover before finalizing the note.    Assessment/Plan:  1. Epistaxis, recurrent     2. Essential hypertension     3. Hypothyroidism, postsurgical  liothyronine (CYTOMEL) 25 MCG Tab    TSH+FREE T4    TRIIODOTHYRONINE (T3)   4. Morbid obesity with BMI of 40.0-44.9, adult (HCC)  Patient identified as having weight management issue.  Appropriate orders and counseling given.   5. Fatigue, unspecified type  FSH+LH+DHEA S+PROG_E1+E2          I have placed the below orders and discussed them with an approved delegating provider. The MA is performing the below orders under the direction of Dr. Austin.

## 2018-05-07 NOTE — ASSESSMENT & PLAN NOTE
Patient is taking levothyroxine 150 µg every morning. States in the past she was also taking cytomel 25 mcg twice daily and states a year ago he dose had changed to 25 mcg daily. Patient states she never addressed that issue and believes this may be the reason why her TSH is at 79 and T4 is decreased. She states that she is feeling very tired has been gaining weight states that she is sleeping 4 hours every night. Patient has also noticed changes in her hair and skin.

## 2018-05-07 NOTE — ASSESSMENT & PLAN NOTE
This is a continued issue. States that she had one nose bleed last night, lasting a few minutes. States that steroid gel has been working well to control symptoms. States that she was seen in the ER for a greter than hour-long nose bleed. At the ER they used a sponge to control symptoms. She was then seen by ENT on 4/26. States she has follow-up in 5 weeks with ENT. No longer having clots. States ENT saw a large area of irritation.

## 2018-05-07 NOTE — LETTER
[facility]  TINO JimRISHAAN  [address]  [place] [address]  Fax: [phone]   Authorization for Release/Disclosure of   Protected Health Information   Name: NADEEM SHEIKH : 1965 SSN: [id number]   Address: [address] [place] [address] [address]  [place] [address] Phone:    [phone] (home)    I authorize the entity listed below to release/disclose the PHI below to:   [facility]/EVELYNE JimP.RISHAAN and STEPHENIE Jim   Provider or Entity Name:  [facility]    Address   City, State, Zip   Phone:  [phone]    Fax:  [phone]   Reason for request: continuity of care   Information to be released:    [  ] LAST COLONOSCOPY,  including any PATH REPORT and follow-up  [  ] LAST FIT/COLOGUARD RESULT [  ] LAST DEXA  [  ] LAST MAMMOGRAM  [  ] LAST PAP  [  ] LAST LABS [  ] RETINA EXAM REPORT  [  ] IMMUNIZATION RECORDS  [X  ] Release all info      [  ] Check here and initial the line next to each item to release ALL health information INCLUDING  _____ Care and treatment for drug and / or alcohol abuse  _____ HIV testing, infection status, or AIDS  _____ Genetic Testing    DATES OF SERVICE OR TIME PERIOD TO BE DISCLOSED: _____________  I understand and acknowledge that:  * This Authorization may be revoked at any time by you in writing, except if your health information has already been used or disclosed.  * Your health information that will be used or disclosed as a result of you signing this authorization could be re-disclosed by the recipient. If this occurs, your re-disclosed health information may no longer be protected by State or Federal laws.  * You may refuse to sign this Authorization. Your refusal will not affect your ability to obtain treatment.  * This Authorization becomes effective upon signing and will  on (date) __________.      If no date is indicated, this Authorization will  one (1) year from the  signature date.    Name: Brielle Krishnamurthy    Signature:   Date:     5/7/2018       PLEASE FAX REQUESTED RECORDS BACK TO: [phone]

## 2018-05-17 DIAGNOSIS — I10 ESSENTIAL HYPERTENSION: ICD-10-CM

## 2018-05-17 RX ORDER — LISINOPRIL 20 MG/1
TABLET ORAL
Qty: 30 TAB | Refills: 0 | Status: SHIPPED | OUTPATIENT
Start: 2018-05-17 | End: 2018-06-18 | Stop reason: SDUPTHER

## 2018-06-18 DIAGNOSIS — I10 ESSENTIAL HYPERTENSION: ICD-10-CM

## 2018-06-18 RX ORDER — LISINOPRIL 20 MG/1
TABLET ORAL
Qty: 30 TAB | Refills: 0 | Status: SHIPPED | OUTPATIENT
Start: 2018-06-18 | End: 2018-07-09

## 2018-07-09 ENCOUNTER — OFFICE VISIT (OUTPATIENT)
Dept: MEDICAL GROUP | Facility: PHYSICIAN GROUP | Age: 53
End: 2018-07-09
Payer: COMMERCIAL

## 2018-07-09 ENCOUNTER — HOSPITAL ENCOUNTER (OUTPATIENT)
Dept: LAB | Facility: MEDICAL CENTER | Age: 53
End: 2018-07-09
Attending: NURSE PRACTITIONER
Payer: COMMERCIAL

## 2018-07-09 VITALS
OXYGEN SATURATION: 97 % | WEIGHT: 269 LBS | HEIGHT: 69 IN | BODY MASS INDEX: 39.84 KG/M2 | DIASTOLIC BLOOD PRESSURE: 102 MMHG | TEMPERATURE: 97.7 F | HEART RATE: 64 BPM | RESPIRATION RATE: 16 BRPM | SYSTOLIC BLOOD PRESSURE: 144 MMHG

## 2018-07-09 DIAGNOSIS — E89.0 HYPOTHYROIDISM, POSTSURGICAL: ICD-10-CM

## 2018-07-09 DIAGNOSIS — I10 ESSENTIAL HYPERTENSION: ICD-10-CM

## 2018-07-09 DIAGNOSIS — E66.01 MORBID OBESITY WITH BMI OF 40.0-44.9, ADULT (HCC): ICD-10-CM

## 2018-07-09 PROBLEM — R04.0 EPISTAXIS, RECURRENT: Status: RESOLVED | Noted: 2018-04-20 | Resolved: 2018-07-09

## 2018-07-09 LAB
DHEA-S SERPL-MCNC: 72 UG/DL (ref 35.4–256)
ESTRADIOL SERPL-MCNC: 171 PG/ML
FSH SERPL-ACNC: 6.1 MIU/ML
LH SERPL-ACNC: 6 IU/L
PROGEST SERPL-MCNC: 3.33 NG/ML
T3 SERPL-MCNC: 288.1 NG/DL (ref 60–181)
T4 FREE SERPL-MCNC: 0.25 NG/DL (ref 0.53–1.43)
TSH SERPL DL<=0.005 MIU/L-ACNC: 4.09 UIU/ML (ref 0.38–5.33)

## 2018-07-09 PROCEDURE — 84144 ASSAY OF PROGESTERONE: CPT

## 2018-07-09 PROCEDURE — 83001 ASSAY OF GONADOTROPIN (FSH): CPT

## 2018-07-09 PROCEDURE — 84443 ASSAY THYROID STIM HORMONE: CPT

## 2018-07-09 PROCEDURE — 83002 ASSAY OF GONADOTROPIN (LH): CPT

## 2018-07-09 PROCEDURE — 36415 COLL VENOUS BLD VENIPUNCTURE: CPT

## 2018-07-09 PROCEDURE — 82670 ASSAY OF TOTAL ESTRADIOL: CPT

## 2018-07-09 PROCEDURE — 99214 OFFICE O/P EST MOD 30 MIN: CPT | Performed by: NURSE PRACTITIONER

## 2018-07-09 PROCEDURE — 82679 ASSAY OF ESTRONE: CPT

## 2018-07-09 PROCEDURE — 84439 ASSAY OF FREE THYROXINE: CPT

## 2018-07-09 PROCEDURE — 84480 ASSAY TRIIODOTHYRONINE (T3): CPT

## 2018-07-09 PROCEDURE — 82627 DEHYDROEPIANDROSTERONE: CPT

## 2018-07-09 RX ORDER — LISINOPRIL 40 MG/1
40 TABLET ORAL DAILY
Qty: 60 TAB | Refills: 0 | Status: SHIPPED | OUTPATIENT
Start: 2018-07-09 | End: 2020-12-31

## 2018-07-09 ASSESSMENT — PAIN SCALES - GENERAL: PAINLEVEL: NO PAIN

## 2018-07-09 ASSESSMENT — PATIENT HEALTH QUESTIONNAIRE - PHQ9: CLINICAL INTERPRETATION OF PHQ2 SCORE: 0

## 2018-07-09 NOTE — ASSESSMENT & PLAN NOTE
Chronic in nature.  Stable.  Patient is currently on levothyroxine 175 mcg daily continues to take Cytomel.  Last TSH was 79 and last T4 was 0.53.  Patient continues to have some fatigue, states that this has improved but has not resolved.

## 2018-07-09 NOTE — ASSESSMENT & PLAN NOTE
Chronic in nature.  Patient continues to work on healthy diet and exercise.  States that this is improving since she has been less tired.

## 2018-07-09 NOTE — ASSESSMENT & PLAN NOTE
Chronic in nature.  Stable.  Blood pressure today is 144/102 on repeat plan to increase blood pressure medication from lisinopril 20 mg to lisinopril 40 mg.  Patient is due to complete ordered labs.  States she will do that after this appointment.  Patient denies chest pain, palpitations, dizziness, blurry vision.

## 2018-07-09 NOTE — PROGRESS NOTES
Chief Complaint   Patient presents with   • Follow-Up     Labs        HISTORY OF PRESENT ILLNESS: Patient is a 52 y.o. female established patient who presents today to discuss HTN, hypothyroidism.     Essential hypertension  Chronic in nature.  Stable.  Blood pressure today is 144/102 on repeat plan to increase blood pressure medication from lisinopril 20 mg to lisinopril 40 mg.  Patient is due to complete ordered labs.  States she will do that after this appointment.  Patient denies chest pain, palpitations, dizziness, blurry vision.      Hypothyroidism, postsurgical  Chronic in nature.  Stable.  Patient is currently on levothyroxine 175 mcg daily continues to take Cytomel.  Last TSH was 79 and last T4 was 0.53.  Patient continues to have some fatigue, states that this has improved but has not resolved.    Morbid obesity with BMI of 40.0-44.9, adult (HCC)  Chronic in nature.  Patient continues to work on healthy diet and exercise.  States that this is improving since she has been less tired.      Patient Active Problem List    Diagnosis Date Noted   • Essential hypertension 04/20/2018   • Morbid obesity with BMI of 40.0-44.9, adult (HCC) 07/31/2017   • Noncompliance 07/31/2017   • Pure hypercholesterolemia 03/13/2016   • Intramural leiomyoma of uterus 06/16/2014   • Depression with anxiety 11/18/2013   • History of chlamydia infection 11/27/2012   • Hypothyroidism, postsurgical    • Cervical radiculopathy 07/25/2012       Allergies:Patient has no known allergies.    Current Outpatient Prescriptions   Medication Sig Dispense Refill   • lisinopril (PRINIVIL, ZESTRIL) 40 MG tablet Take 1 Tab by mouth every day. 60 Tab 0   • liothyronine (CYTOMEL) 25 MCG Tab Take 1 Tab by mouth 2 Times a Day. 180 Tab 0   • triamcinolone (ARISTOCORT) 0.5 % ointment ALBAN AA BID UTD  3   • levothyroxine (SYNTHROID) 175 MCG Tab Take 1 Tab by mouth Every morning on an empty stomach. 30 Tab 6   • buPROPion (WELLBUTRIN XL) 150 MG XL tablet  Take 1 Tab by mouth 2 Times a Day. TWICE DAILY 60 Tab 6   • vitamin A 48813 UNIT capsule Take 10,000 Units by mouth every day.     • Calcium Carbonate-Vit D-Min (CALCIUM 1200 PO) Take  by mouth every day.     • Magnesium 400 MG CAPS Take  by mouth every day.     • Cholecalciferol (VITAMIN D-3) 5000 UNITS TABS Take 2 Tabs by mouth every day.       No current facility-administered medications for this visit.        Social History   Substance Use Topics   • Smoking status: Never Smoker   • Smokeless tobacco: Never Used      Comment: occasional social smoker   • Alcohol use Yes      Comment: occasionally       Family Status   Relation Status   • Mother Alive   • Father Alive   •       Family History   Problem Relation Age of Onset   • Hypertension Mother    • Cancer     • Heart Disease     • Stroke         Review of Systems:   Constitutional:  Negative for fever, chills, weight loss and malaise/fatigue.   HEENT:  Negative for ear pain, nosebleeds, congestion, sore throat and neck pain.    Eyes:  Negative for blurred vision.   Respiratory:  Negative for cough, sputum production, shortness of breath and wheezing.    Cardiovascular:  Negative for chest pain, palpitations, orthopnea and leg swelling.   Gastrointestinal:  Negative for heartburn, nausea, vomiting and abdominal pain.   Genitourinary:  Negative for dysuria, urgency and frequency.   Musculoskeletal:  Negative for myalgias, back pain and joint pain.   Skin:  Negative for rash and itching.   Neurological:  Negative for dizziness, tingling, tremors, sensory change, focal weakness and headaches.   Endo/Heme/Allergies:  Does not bruise/bleed easily.   Psychiatric/Behavioral:  Negative for depression, suicidal ideas and memory loss.  The patient is not nervous/anxious and does not have insomnia.    All other systems reviewed and are negative except as in HPI.    Exam:  Blood pressure 144/102, pulse 64, temperature 36.5 °C (97.7 °F), resp. rate 16, height 1.753 m (5'  "9\"), weight 122 kg (269 lb), last menstrual period 06/10/2014, SpO2 97 %, not currently breastfeeding.  General:  Normal appearing. No distress.  Pulmonary:  Clear to ausculation.  Normal effort. No rales, ronchi, or wheezing.  Cardiovascular:  Regular rate and rhythm without murmur. Carotid and radial pulses are intact and equal bilaterally.  Neurologic:  Grossly nonfocal  Skin:  Warm and dry.  No obvious lesions.  Musculoskeletal:  Normal gait. No extremity cyanosis, clubbing, or edema.  Psych:  Normal mood and affect. Alert and oriented x3. Judgment and insight is normal.      PLAN:    1. Essential hypertension  Increase dose to 40 mg.  Patient will take blood pressures at home and send blood pressures via my chart in 2 weeks.  Plan to consider adding diuretic dependent on blood pressures.  Stress with patient initial goal is below 140 over 90.  - lisinopril (PRINIVIL, ZESTRIL) 40 MG tablet; Take 1 Tab by mouth every day.  Dispense: 60 Tab; Refill: 0    2. Hypothyroidism, postsurgical  Continue current dose of medication will adjust medication based on lab results.    3. Morbid obesity with BMI of 40.0-44.9, adult (HCC)  Continue healthy diet and exercise.    Follow-up in 2 months or sooner as needed. Patient is encouraged to be seen in the emergency room for chest pain, palpitations, shortness of breath, dizziness, severe abdominal pain or other concerning symptoms.    Please note that this dictation was created using voice recognition software. I have made every reasonable attempt to correct obvious errors, but I expect that there are errors of grammar and possibly content that I did not discover before finalizing the note.    Assessment/Plan:  1. Essential hypertension  lisinopril (PRINIVIL, ZESTRIL) 40 MG tablet   2. Hypothyroidism, postsurgical     3. Morbid obesity with BMI of 40.0-44.9, adult (HCC)            I have placed the below orders and discussed them with an approved delegating provider. The MA is " performing the below orders under the direction of Dr. Givens.

## 2018-07-12 ENCOUNTER — TELEPHONE (OUTPATIENT)
Dept: MEDICAL GROUP | Facility: PHYSICIAN GROUP | Age: 53
End: 2018-07-12

## 2018-07-12 LAB — ESTRONE SERPL-MCNC: 93.8 PG/ML

## 2018-07-12 NOTE — LETTER
July 25, 2018        Brielle Krishnamurthy  [address]  [place] [address]        Dear Brielle    Here are the results of your test(s):   Please let patient know that testosterone and estrogen level hormones are normal.  Main thyroid hormone is normal to.  t4 and t3 which are short acting thyroid hormone level are inconclusive. I will defer if aydee or endocrinologist will make adjustment to cytomel at her next apt, but there is no clear evidence to change anything at this time.   Please let me know if pt has more questions. Otherwise aydee will review again these lab work with her at her next apt 09/2018  Thank you,  Gregory Cuadra M.D.    Comments:  Please call me if you have any questions.        Sincerely,    KRISTEN Jim      Signed Electronically

## 2018-07-12 NOTE — TELEPHONE ENCOUNTER
----- Message from Gregory Cuadra M.D. sent at 7/12/2018 12:41 PM PDT -----  Please let patient know that testosterone and estrogen level hormones are normal.  Main thyroid hormone is normal to.  t4 and t3 which are short acting thyroid hormone level are inconclusive. I will defer if aydee or endocrinologist will make adjustment to cytomel at her next apt, but there is no clear evidence to change anything at this time.   Please let me know if pt has more questions. Otherwise aydee will review again these lab work with her at her next apt 09/2018  Thank you,  Gregory Cuadra M.D.

## 2018-08-17 DIAGNOSIS — E89.0 HYPOTHYROIDISM, POSTSURGICAL: ICD-10-CM

## 2018-08-17 RX ORDER — LIOTHYRONINE SODIUM 25 UG/1
TABLET ORAL
Qty: 180 TAB | Refills: 0 | Status: SHIPPED | OUTPATIENT
Start: 2018-08-17 | End: 2018-12-22 | Stop reason: SDUPTHER

## 2018-09-17 ENCOUNTER — OFFICE VISIT (OUTPATIENT)
Dept: MEDICAL GROUP | Facility: PHYSICIAN GROUP | Age: 53
End: 2018-09-17
Payer: COMMERCIAL

## 2018-09-17 ENCOUNTER — TELEPHONE (OUTPATIENT)
Dept: MEDICAL GROUP | Facility: PHYSICIAN GROUP | Age: 53
End: 2018-09-17

## 2018-09-17 VITALS
DIASTOLIC BLOOD PRESSURE: 92 MMHG | HEART RATE: 77 BPM | RESPIRATION RATE: 16 BRPM | SYSTOLIC BLOOD PRESSURE: 142 MMHG | TEMPERATURE: 97.9 F | BODY MASS INDEX: 39.4 KG/M2 | OXYGEN SATURATION: 97 % | HEIGHT: 69 IN | WEIGHT: 266 LBS

## 2018-09-17 DIAGNOSIS — Z00.00 WELL ADULT EXAM: ICD-10-CM

## 2018-09-17 DIAGNOSIS — I10 ESSENTIAL HYPERTENSION: ICD-10-CM

## 2018-09-17 DIAGNOSIS — E89.0 HYPOTHYROIDISM, POSTSURGICAL: ICD-10-CM

## 2018-09-17 PROCEDURE — 99214 OFFICE O/P EST MOD 30 MIN: CPT | Performed by: NURSE PRACTITIONER

## 2018-09-17 RX ORDER — LEVOTHYROXINE SODIUM 0.2 MG/1
200 TABLET ORAL
Qty: 60 TAB | Refills: 0 | Status: SHIPPED | OUTPATIENT
Start: 2018-09-17 | End: 2021-06-14

## 2018-09-17 ASSESSMENT — PAIN SCALES - GENERAL: PAINLEVEL: NO PAIN

## 2018-09-17 NOTE — ASSESSMENT & PLAN NOTE
Chronic in nature.  T4 still low at this time but TSH is within normal limits plan increase levothyroxine to 200 mcg.  Patient denies side effects at this time.Taking medicine as directed. Denies palpitations, skin changes, temperature intolerance, changes in bowel habits.

## 2018-09-17 NOTE — ASSESSMENT & PLAN NOTE
Chronic in nature.  Stable.  Blood pressure today is 142/92.  This is improved from previous blood pressures.  Patient states that she has been drinking an increased amount of energy drinks that she has felt really tired lately.  Patient states that she notices that on days she does not drink energy drinks her blood pressure is 130s over 80s.  Patient does have a variable schedule and works night shift some days and switches to day shift in the middle of the week.  Patient denies chest pain, palpitations, dizziness, blurry vision.

## 2018-09-17 NOTE — PROGRESS NOTES
Chief Complaint   Patient presents with   • Blood Pressure Problem     better but not great; not as high; 147/90        HISTORY OF PRESENT ILLNESS: Patient is a 53 y.o. female established patient who presents today to discuss hypothyroid and HTN/    Hypothyroidism, postsurgical  Chronic in nature.  T4 still low at this time but TSH is within normal limits plan increase levothyroxine to 200 mcg.  Patient denies side effects at this time.Taking medicine as directed. Denies palpitations, skin changes, temperature intolerance, changes in bowel habits.      Essential hypertension  Chronic in nature.  Stable.  Blood pressure today is 142/92.  This is improved from previous blood pressures.  Patient states that she has been drinking an increased amount of energy drinks that she has felt really tired lately.  Patient states that she notices that on days she does not drink energy drinks her blood pressure is 130s over 80s.  Patient does have a variable schedule and works night shift some days and switches to day shift in the middle of the week.  Patient denies chest pain, palpitations, dizziness, blurry vision.      Patient Active Problem List    Diagnosis Date Noted   • Essential hypertension 04/20/2018   • Morbid obesity with BMI of 40.0-44.9, adult ([facility]) 07/31/2017   • Noncompliance 07/31/2017   • Pure hypercholesterolemia 03/13/2016   • Intramural leiomyoma of uterus 06/16/2014   • Depression with anxiety 11/18/2013   • History of chlamydia infection 11/27/2012   • Hypothyroidism, postsurgical    • Cervical radiculopathy 07/25/2012       Allergies:Patient has no known allergies.    Current Outpatient Prescriptions   Medication Sig Dispense Refill   • levothyroxine (SYNTHROID) 200 MCG Tab Take 1 Tab by mouth Every morning on an empty stomach. 60 Tab 0   • liothyronine (CYTOMEL) 25 MCG Tab TAKE 1 TABLET BY MOUTH TWICE DAILY 180 Tab 0   • lisinopril (PRINIVIL, ZESTRIL) 40 MG tablet Take 1 Tab by mouth every day. 60 Tab 0    • triamcinolone (ARISTOCORT) 0.5 % ointment ALBAN AA BID UTD  3   • buPROPion (WELLBUTRIN XL) 150 MG XL tablet Take 1 Tab by mouth 2 Times a Day. TWICE DAILY 60 Tab 6   • vitamin A 84000 UNIT capsule Take 10,000 Units by mouth every day.     • Calcium Carbonate-Vit D-Min (CALCIUM 1200 PO) Take  by mouth every day.     • Magnesium 400 MG CAPS Take  by mouth every day.     • Cholecalciferol (VITAMIN D-3) 5000 UNITS TABS Take 2 Tabs by mouth every day.       No current facility-administered medications for this visit.        Social History   Substance Use Topics   • Smoking status: Never Smoker   • Smokeless tobacco: Never Used      Comment: occasional social smoker   • Alcohol use Yes      Comment: occasionally       Family Status   Relation Status   • Mo Alive   • Fa Alive   • Unknown (Not Specified)     Family History   Problem Relation Age of Onset   • Hypertension Mother    • Cancer Unknown    • Heart Disease Unknown    • Stroke Unknown        Review of Systems:   Constitutional:  Negative for fever, chills, weight loss and malaise/fatigue.   HEENT:  Negative for ear pain, nosebleeds, congestion, sore throat and neck pain.    Eyes:  Negative for blurred vision.   Respiratory:  Negative for cough, sputum production, shortness of breath and wheezing.    Cardiovascular:  Negative for chest pain, palpitations, orthopnea and leg swelling.   Gastrointestinal:  Negative for heartburn, nausea, vomiting and abdominal pain.   Genitourinary:  Negative for dysuria, urgency and frequency.   Musculoskeletal:  Negative for myalgias, back pain and joint pain.   Skin:  Negative for rash and itching.   Neurological:  Negative for dizziness, tingling, tremors, sensory change, focal weakness and headaches.   Endo/Heme/Allergies:  Does not bruise/bleed easily.   Psychiatric/Behavioral: Positive for mild depression, negative suicidal ideas and memory loss.  The patient is not nervous/anxious and does not have insomnia.    All other  "systems reviewed and are negative except as in HPI.    Exam:  Blood pressure 142/92, pulse 77, temperature 36.6 °C (97.9 °F), resp. rate 16, height 1.753 m (5' 9\"), weight 120.7 kg (266 lb), last menstrual period 06/10/2014, SpO2 97 %, not currently breastfeeding.  General:  Normal appearing. No distress.  Pulmonary:  Clear to ausculation.  Normal effort. No rales, ronchi, or wheezing.  Cardiovascular:  Regular rate and rhythm without murmur. Carotid and radial pulses are intact and equal bilaterally.  Neurologic:  Grossly nonfocal  Skin:  Warm and dry.  No obvious lesions.  Musculoskeletal:  Normal gait. No extremity cyanosis, clubbing, or edema.  Psych:  Normal mood and affect. Alert and oriented x3. Judgment and insight is normal.      PLAN:    1. Hypothyroidism, postsurgical  Patient will increase dose to 200 mcg counseled regarding risks, benefits, side effects of medication.  Discussed with patient completed an updated TSH in 2 months  Follow-up at that time.  - levothyroxine (SYNTHROID) 200 MCG Tab; Take 1 Tab by mouth Every morning on an empty stomach.  Dispense: 60 Tab; Refill: 0  - TSH+FREE T4    2. Essential hypertension  Patient will continue lisinopril 40 mg time.  Patient is going to check her blood pressure over the next 2 weeks, will stop energy drinks  Patient will send this provider blood pressures we will consider adding amlodipine 5 mg if blood pressures are not below 140/90.    Follow-up in 2 months or sooner as needed for hypothyroidism. Patient is encouraged to be seen in the emergency room for chest pain, palpitations, shortness of breath, dizziness, severe abdominal pain or other concerning symptoms.    Please note that this dictation was created using voice recognition software. I have made every reasonable attempt to correct obvious errors, but I expect that there are errors of grammar and possibly content that I did not discover before finalizing the note.    Assessment/Plan:  1. " Hypothyroidism, postsurgical  levothyroxine (SYNTHROID) 200 MCG Tab    TSH+FREE T4   2. Essential hypertension            I have placed the below orders and discussed them with an approved delegating provider. The MA is performing the below orders under the direction of Dr. Austin.

## 2018-09-17 NOTE — TELEPHONE ENCOUNTER
1. Caller Name: Brielle Krishnamurthy                                           Call Back Number: [phone] (home)         Patient approves a detailed voicemail message: N\A    Patient wants to also have labs to check her Cholesterol. Goes to [facility] lab. Please Advise. LM

## 2018-11-19 ENCOUNTER — APPOINTMENT (OUTPATIENT)
Dept: MEDICAL GROUP | Facility: PHYSICIAN GROUP | Age: 53
End: 2018-11-19
Payer: COMMERCIAL

## 2018-12-22 DIAGNOSIS — E89.0 HYPOTHYROIDISM, POSTSURGICAL: ICD-10-CM

## 2018-12-24 RX ORDER — LIOTHYRONINE SODIUM 25 UG/1
TABLET ORAL
Qty: 180 TAB | Refills: 1 | Status: SHIPPED | OUTPATIENT
Start: 2018-12-24 | End: 2019-11-17 | Stop reason: SDUPTHER

## 2019-11-17 DIAGNOSIS — E89.0 HYPOTHYROIDISM, POSTSURGICAL: ICD-10-CM

## 2019-11-18 RX ORDER — LIOTHYRONINE SODIUM 25 UG/1
TABLET ORAL
Qty: 180 TAB | Refills: 0 | Status: SHIPPED | OUTPATIENT
Start: 2019-11-18 | End: 2020-03-04 | Stop reason: SDUPTHER

## 2020-02-28 DIAGNOSIS — E89.0 HYPOTHYROIDISM, POSTSURGICAL: ICD-10-CM
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
## 2020-03-02 RX ORDER — LIOTHYRONINE SODIUM 25 UG/1
25 TABLET ORAL DAILY
Qty: 180 TAB | Refills: 0 | OUTPATIENT
Start: 2020-03-02

## 2020-03-03 ENCOUNTER — TELEPHONE (OUTPATIENT)
Dept: MEDICAL GROUP | Facility: PHYSICIAN GROUP | Age: 55
End: 2020-03-03

## 2020-03-03 DIAGNOSIS — E89.0 HYPOTHYROIDISM, POSTSURGICAL: ICD-10-CM

## 2020-03-04 DIAGNOSIS — E89.0 HYPOTHYROIDISM, POSTSURGICAL: ICD-10-CM

## 2020-03-04 RX ORDER — LIOTHYRONINE SODIUM 25 UG/1
TABLET ORAL
Qty: 60 TAB | Refills: 0 | Status: CANCELLED | OUTPATIENT
Start: 2020-03-04

## 2020-03-04 RX ORDER — LIOTHYRONINE SODIUM 25 UG/1
TABLET ORAL
Qty: 180 TAB | Refills: 0 | OUTPATIENT
Start: 2020-03-04

## 2020-03-04 RX ORDER — LIOTHYRONINE SODIUM 25 UG/1
TABLET ORAL
Qty: 14 TAB | Refills: 0 | Status: SHIPPED | OUTPATIENT
Start: 2020-03-04 | End: 2021-05-21 | Stop reason: SDUPTHER

## 2020-03-04 NOTE — TELEPHONE ENCOUNTER
Received request via: Pharmacy    Was the patient seen in the last year in this department? Yes    Does the patient have an active prescription (recently filled or refills available) for medication(s) requested? No     Pt brandy melendez with meds in so will need a two week fill until her appt on 03/19/2020 with you

## 2020-03-04 NOTE — TELEPHONE ENCOUNTER
Received request via: Pharmacy    Was the patient seen in the last year in this department?Yes    Does the patient have an active prescription (recently filled or refills available) for medication(s) requested? No
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
## 2020-12-31 ENCOUNTER — TELEMEDICINE (OUTPATIENT)
Dept: MEDICAL GROUP | Facility: PHYSICIAN GROUP | Age: 55
End: 2020-12-31
Payer: COMMERCIAL

## 2020-12-31 VITALS — HEIGHT: 69 IN | WEIGHT: 270 LBS | BODY MASS INDEX: 39.99 KG/M2 | RESPIRATION RATE: 16 BRPM

## 2020-12-31 DIAGNOSIS — Z00.00 WELLNESS EXAMINATION: ICD-10-CM

## 2020-12-31 DIAGNOSIS — I10 ESSENTIAL HYPERTENSION: ICD-10-CM

## 2020-12-31 DIAGNOSIS — E89.0 HYPOTHYROIDISM, POSTSURGICAL: ICD-10-CM

## 2020-12-31 DIAGNOSIS — F41.8 DEPRESSION WITH ANXIETY: ICD-10-CM

## 2020-12-31 PROBLEM — Z91.199 NONCOMPLIANCE: Status: RESOLVED | Noted: 2017-07-31 | Resolved: 2020-12-31

## 2020-12-31 PROCEDURE — 99214 OFFICE O/P EST MOD 30 MIN: CPT | Mod: 95,CR | Performed by: NURSE PRACTITIONER

## 2020-12-31 RX ORDER — LEVOTHYROXINE SODIUM 175 UG/1
175 TABLET ORAL
Qty: 30 TAB | Refills: 0 | Status: SHIPPED | OUTPATIENT
Start: 2020-12-31 | End: 2021-02-02 | Stop reason: SDUPTHER

## 2020-12-31 RX ORDER — BUPROPION HYDROCHLORIDE 150 MG/1
150 TABLET ORAL 2 TIMES DAILY
Qty: 180 TAB | Refills: 3 | Status: SHIPPED | OUTPATIENT
Start: 2020-12-31 | End: 2021-09-10 | Stop reason: SDUPTHER

## 2020-12-31 ASSESSMENT — PATIENT HEALTH QUESTIONNAIRE - PHQ9
CLINICAL INTERPRETATION OF PHQ2 SCORE: 4
5. POOR APPETITE OR OVEREATING: 1 - SEVERAL DAYS
SUM OF ALL RESPONSES TO PHQ QUESTIONS 1-9: 14

## 2020-12-31 NOTE — ASSESSMENT & PLAN NOTE
Chronic in nature.  Worse lately states that she feels like her thyroid is off and would like to address this issue prior to considering treatment of depression.  Patient states that overall been managing the symptoms well.  States this is a very common occurrence for her her thyroid medications were not adjusted appropriately.

## 2020-12-31 NOTE — ASSESSMENT & PLAN NOTE
"Chronic in nature.  States she is now having a lot of symptoms of changes in her thyroid.  Currently taking Synthroid only 175 micrograms.  States that she has been more anxious, fatigue, feeling like she has been gaining weight, has had intermittent feelings of depression.  Patient states that she \"knows it is off\".  "

## 2020-12-31 NOTE — PROGRESS NOTES
"Virtual Visit: Established Patient   This visit was conducted via Zoom using secure and encrypted videoconferencing technology. The patient was in a private location in the state of [place].    The patient's identity was confirmed and verbal consent was obtained for this virtual visit.    Subjective:   CC:   Chief Complaint   Patient presents with   • Follow-Up     thyroid and labs   • Medication Refill     Snythroid, Liothyronine, and Buproprion        Brielle Krishnamurthy is a 55 y.o. female presenting for evaluation and management of:    Essential hypertension  Chronic in nature. Stable. Off medications. States it was in the 120's over 80's.  States that she will start rechecking her blood pressure especially considering the change in her thyroid.  Patient denies any chest pain, palpitations, dizziness, blurry vision.    Depression with anxiety  Chronic in nature.  Worse lately states that she feels like her thyroid is off and would like to address this issue prior to considering treatment of depression.  Patient states that overall been managing the symptoms well.  States this is a very common occurrence for her her thyroid medications were not adjusted appropriately.    Hypothyroidism, postsurgical  Chronic in nature.  States she is now having a lot of symptoms of changes in her thyroid.  Currently taking Synthroid only 175 micrograms.  States that she has been more anxious, fatigue, feeling like she has been gaining weight, has had intermittent feelings of depression.  Patient states that she \"knows it is off\".        ROS   Denies any recent fevers or chills. No nausea or vomiting. No chest pains or shortness of breath.     No Known Allergies    Current medicines (including changes today)  Current Outpatient Medications   Medication Sig Dispense Refill   • levothyroxine (SYNTHROID) 175 MCG Tab Take 1 Tab by mouth Every morning on an empty stomach. 30 Tab 0   • buPROPion (WELLBUTRIN XL) 150 MG XL tablet Take 1 " "Tab by mouth 2 Times a Day. TWICE DAILY 180 Tab 3   • liothyronine (CYTOMEL) 25 MCG Tab TAKE 1 TABLET BY MOUTH TWICE DAILY 14 Tab 0   • levothyroxine (SYNTHROID) 200 MCG Tab Take 1 Tab by mouth Every morning on an empty stomach. 60 Tab 0   • vitamin A 16812 UNIT capsule Take 10,000 Units by mouth every day.     • Calcium Carbonate-Vit D-Min (CALCIUM 1200 PO) Take  by mouth every day.     • Magnesium 400 MG CAPS Take  by mouth every day.     • Cholecalciferol (VITAMIN D-3) 5000 UNITS TABS Take 2 Tabs by mouth every day.       No current facility-administered medications for this visit.        Patient Active Problem List    Diagnosis Date Noted   • Essential hypertension 04/20/2018   • Morbid obesity with BMI of 40.0-44.9, adult ([facility]) 07/31/2017   • Pure hypercholesterolemia 03/13/2016   • Intramural leiomyoma of uterus 06/16/2014   • Depression with anxiety 11/18/2013   • History of chlamydia infection 11/27/2012   • Hypothyroidism, postsurgical    • Cervical radiculopathy 07/25/2012       Family History   Problem Relation Age of Onset   • Hypertension Mother    • Cancer Other    • Heart Disease Other    • Stroke Other        She  has a past medical history of Anemia, Clostridium difficile infection (2011), HTN (hypertension) (07-29-11), Hypothyroidism, postsurgical (2011), Other specified disorder of intestines, and Other specified symptom associated with female genital organs. She also has no past medical history of Anesthesia or Breast cancer ([facility]).  She  has a past surgical history that includes submandible abscess incision and drainage (7/6/2011); dental extraction(s) (7/6/2011); thyroidectomy total (8/3/2011); and vaginal hysterectomy scope total (6/16/2014).       Objective:   Resp 16   Ht 1.753 m (5' 9\") Comment: Pt reported  Wt 122.5 kg (270 lb) Comment: Pt reported  LMP 06/10/2014   BMI 39.87 kg/m²     Physical Exam:  Constitutional: Alert, no distress, well-groomed.  Skin: No rashes in visible " areas.  Eye: Round. Conjunctiva clear, lids normal. No icterus.   ENMT: Lips pink without lesions, good dentition, moist mucous membranes. Phonation normal.  Neck: No masses, no thyromegaly. Moves freely without pain.  Respiratory: Unlabored respiratory effort, no cough or audible wheeze  Psych: Alert and oriented x3, normal affect and mood.       Assessment and Plan:   The following treatment plan was discussed:     1. Essential hypertension    2. Depression with anxiety  - buPROPion (WELLBUTRIN XL) 150 MG XL tablet; Take 1 Tab by mouth 2 Times a Day. TWICE DAILY  Dispense: 180 Tab; Refill: 3  - Patient has been identified as having a positive depression screening. Appropriate orders and counseling have been given.    3. Hypothyroidism, postsurgical  - CBC WITH DIFFERENTIAL; Future  - Comp Metabolic Panel; Future  - TSH; Future  - T3 FREE; Future  - FREE THYROXINE; Future  - levothyroxine (SYNTHROID) 175 MCG Tab; Take 1 Tab by mouth Every morning on an empty stomach.  Dispense: 30 Tab; Refill: 0    4. Wellness examination  - Lipid Profile; Future    Plan at this time is to refill Wellbutrin as this helps with her depression and she denies side effects.    Plan is to also update TSH and other thyroid labs to determine what is the appropriate dose for medication.  At this time patient is out of levothyroxine so I am filling it at the 175 mcg dose which is the dose that she is currently taking.  Discussed with patient that we know this is likely the wrong dose since she is not feeling well.  Will reevaluate hopefully next week with updated labs.    To review of labs patient may need to follow-up to discuss changes of medication.  Patient is aware of this.    Follow-up: Return if symptoms worsen or fail to improve, for Lab Review.

## 2021-02-02 ENCOUNTER — PATIENT MESSAGE (OUTPATIENT)
Dept: MEDICAL GROUP | Facility: PHYSICIAN GROUP | Age: 56
End: 2021-02-02

## 2021-02-02 DIAGNOSIS — E89.0 HYPOTHYROIDISM, POSTSURGICAL: ICD-10-CM

## 2021-02-03 RX ORDER — LEVOTHYROXINE SODIUM 175 UG/1
175 TABLET ORAL
Qty: 30 TAB | Refills: 0 | Status: SHIPPED | OUTPATIENT
Start: 2021-02-03 | End: 2021-03-09 | Stop reason: SDUPTHER

## 2021-03-09 DIAGNOSIS — E89.0 HYPOTHYROIDISM, POSTSURGICAL: ICD-10-CM

## 2021-03-11 RX ORDER — LEVOTHYROXINE SODIUM 175 UG/1
175 TABLET ORAL
Qty: 90 TABLET | Refills: 0 | Status: SHIPPED | OUTPATIENT
Start: 2021-03-11 | End: 2021-06-14

## 2021-05-11 ENCOUNTER — TELEPHONE (OUTPATIENT)
Dept: MEDICAL GROUP | Facility: PHYSICIAN GROUP | Age: 56
End: 2021-05-11

## 2021-05-11 NOTE — TELEPHONE ENCOUNTER
Phone Number Called: [phone] (home)     Call outcome: Left detailed message for patient. Informed to call back with any additional questions.    Message: LVM for Pt that lab orders were still valid and had not yet . Informed will need to complete the labs for PCP to determine next steps.

## 2021-05-11 NOTE — TELEPHONE ENCOUNTER
"VOICEMAIL  1. Caller Name: Brielle Krishnamurthy.                      Call Back Number:[phone] (home)     2. Message: Pt called to see if her lab orders were still valid or if they had .  Pt stated it has been getting to the point that she \"is hardly functioning.\"  Asked for a cb.      "

## 2021-05-20 ENCOUNTER — HOSPITAL ENCOUNTER (OUTPATIENT)
Dept: LAB | Facility: MEDICAL CENTER | Age: 56
End: 2021-05-20
Attending: NURSE PRACTITIONER
Payer: COMMERCIAL

## 2021-05-20 DIAGNOSIS — E89.0 HYPOTHYROIDISM, POSTSURGICAL: ICD-10-CM

## 2021-05-20 DIAGNOSIS — Z00.00 WELLNESS EXAMINATION: ICD-10-CM

## 2021-05-20 LAB
ALBUMIN SERPL BCP-MCNC: 3.9 G/DL (ref 3.2–4.9)
ALBUMIN/GLOB SERPL: 1.3 G/DL
ALP SERPL-CCNC: 73 U/L (ref 30–99)
ALT SERPL-CCNC: 15 U/L (ref 2–50)
ANION GAP SERPL CALC-SCNC: 10 MMOL/L (ref 7–16)
AST SERPL-CCNC: 14 U/L (ref 12–45)
BASOPHILS # BLD AUTO: 1.2 % (ref 0–1.8)
BASOPHILS # BLD: 0.08 K/UL (ref 0–0.12)
BILIRUB SERPL-MCNC: 0.2 MG/DL (ref 0.1–1.5)
BUN SERPL-MCNC: 11 MG/DL (ref 8–22)
CALCIUM SERPL-MCNC: 9 MG/DL (ref 8.5–10.5)
CHLORIDE SERPL-SCNC: 101 MMOL/L (ref 96–112)
CHOLEST SERPL-MCNC: 260 MG/DL (ref 100–199)
CO2 SERPL-SCNC: 26 MMOL/L (ref 20–33)
CREAT SERPL-MCNC: 0.78 MG/DL (ref 0.5–1.4)
EOSINOPHIL # BLD AUTO: 0.12 K/UL (ref 0–0.51)
EOSINOPHIL NFR BLD: 1.8 % (ref 0–6.9)
ERYTHROCYTE [DISTWIDTH] IN BLOOD BY AUTOMATED COUNT: 43.6 FL (ref 35.9–50)
FASTING STATUS PATIENT QL REPORTED: NORMAL
GLOBULIN SER CALC-MCNC: 3.1 G/DL (ref 1.9–3.5)
GLUCOSE SERPL-MCNC: 103 MG/DL (ref 65–99)
HCT VFR BLD AUTO: 43.4 % (ref 37–47)
HDLC SERPL-MCNC: 46 MG/DL
HGB BLD-MCNC: 14 G/DL (ref 12–16)
IMM GRANULOCYTES # BLD AUTO: 0.03 K/UL (ref 0–0.11)
IMM GRANULOCYTES NFR BLD AUTO: 0.4 % (ref 0–0.9)
LDLC SERPL CALC-MCNC: 170 MG/DL
LYMPHOCYTES # BLD AUTO: 1.71 K/UL (ref 1–4.8)
LYMPHOCYTES NFR BLD: 25.4 % (ref 22–41)
MCH RBC QN AUTO: 27 PG (ref 27–33)
MCHC RBC AUTO-ENTMCNC: 32.3 G/DL (ref 33.6–35)
MCV RBC AUTO: 83.6 FL (ref 81.4–97.8)
MONOCYTES # BLD AUTO: 0.38 K/UL (ref 0–0.85)
MONOCYTES NFR BLD AUTO: 5.6 % (ref 0–13.4)
NEUTROPHILS # BLD AUTO: 4.42 K/UL (ref 2–7.15)
NEUTROPHILS NFR BLD: 65.6 % (ref 44–72)
NRBC # BLD AUTO: 0 K/UL
NRBC BLD-RTO: 0 /100 WBC
PLATELET # BLD AUTO: 290 K/UL (ref 164–446)
PMV BLD AUTO: 11.1 FL (ref 9–12.9)
POTASSIUM SERPL-SCNC: 4.1 MMOL/L (ref 3.6–5.5)
PROT SERPL-MCNC: 7 G/DL (ref 6–8.2)
RBC # BLD AUTO: 5.19 M/UL (ref 4.2–5.4)
SODIUM SERPL-SCNC: 137 MMOL/L (ref 135–145)
T3FREE SERPL-MCNC: 2.32 PG/ML (ref 2–4.4)
T4 FREE SERPL-MCNC: 1.4 NG/DL (ref 0.93–1.7)
TRIGL SERPL-MCNC: 220 MG/DL (ref 0–149)
TSH SERPL DL<=0.005 MIU/L-ACNC: 2.44 UIU/ML (ref 0.38–5.33)
WBC # BLD AUTO: 6.7 K/UL (ref 4.8–10.8)

## 2021-05-20 PROCEDURE — 36415 COLL VENOUS BLD VENIPUNCTURE: CPT

## 2021-05-20 PROCEDURE — 85025 COMPLETE CBC W/AUTO DIFF WBC: CPT

## 2021-05-20 PROCEDURE — 84443 ASSAY THYROID STIM HORMONE: CPT

## 2021-05-20 PROCEDURE — 84439 ASSAY OF FREE THYROXINE: CPT

## 2021-05-20 PROCEDURE — 80053 COMPREHEN METABOLIC PANEL: CPT

## 2021-05-20 PROCEDURE — 84481 FREE ASSAY (FT-3): CPT

## 2021-05-20 PROCEDURE — 80061 LIPID PANEL: CPT

## 2021-06-11 DIAGNOSIS — E89.0 HYPOTHYROIDISM, POSTSURGICAL: ICD-10-CM

## 2021-06-14 RX ORDER — LEVOTHYROXINE SODIUM 175 UG/1
175 TABLET ORAL
Qty: 90 TABLET | Refills: 0 | Status: SHIPPED | OUTPATIENT
Start: 2021-06-14 | End: 2021-09-10 | Stop reason: SDUPTHER

## 2021-09-10 DIAGNOSIS — E89.0 HYPOTHYROIDISM, POSTSURGICAL: ICD-10-CM

## 2021-09-10 DIAGNOSIS — F41.8 DEPRESSION WITH ANXIETY: ICD-10-CM

## 2021-09-10 NOTE — TELEPHONE ENCOUNTER
----- Message from Brielle Lancaster sent at 9/10/2021  9:11 AM PDT -----  Regarding: Prescription refill  Hi, I need to get my liothyronine refilled.   My synthroid refilled as well as my buproprion    Thank you

## 2021-09-13 RX ORDER — BUPROPION HYDROCHLORIDE 150 MG/1
150 TABLET ORAL 2 TIMES DAILY
Qty: 180 TABLET | Refills: 0 | Status: SHIPPED | OUTPATIENT
Start: 2021-09-13 | End: 2021-12-20 | Stop reason: SDUPTHER

## 2021-09-13 RX ORDER — LIOTHYRONINE SODIUM 25 UG/1
TABLET ORAL
Qty: 90 TABLET | Refills: 0 | Status: SHIPPED | OUTPATIENT
Start: 2021-09-13 | End: 2021-10-28

## 2021-09-13 RX ORDER — LEVOTHYROXINE SODIUM 175 UG/1
175 TABLET ORAL
Qty: 90 TABLET | Refills: 0 | Status: SHIPPED | OUTPATIENT
Start: 2021-09-13 | End: 2021-12-30 | Stop reason: SDUPTHER

## 2021-10-28 DIAGNOSIS — E89.0 HYPOTHYROIDISM, POSTSURGICAL: ICD-10-CM

## 2021-10-28 RX ORDER — LIOTHYRONINE SODIUM 25 UG/1
TABLET ORAL
Qty: 90 TABLET | Refills: 0 | Status: SHIPPED | OUTPATIENT
Start: 2021-10-28 | End: 2021-12-30

## 2021-12-20 DIAGNOSIS — F41.8 DEPRESSION WITH ANXIETY: ICD-10-CM

## 2021-12-20 RX ORDER — BUPROPION HYDROCHLORIDE 150 MG/1
150 TABLET ORAL 2 TIMES DAILY
Qty: 180 TABLET | Refills: 0 | Status: SHIPPED | OUTPATIENT
Start: 2021-12-20 | End: 2022-03-10

## 2021-12-30 ENCOUNTER — TELEMEDICINE (OUTPATIENT)
Dept: MEDICAL GROUP | Facility: PHYSICIAN GROUP | Age: 56
End: 2021-12-30
Payer: COMMERCIAL

## 2021-12-30 VITALS
HEIGHT: 69 IN | WEIGHT: 270 LBS | DIASTOLIC BLOOD PRESSURE: 120 MMHG | SYSTOLIC BLOOD PRESSURE: 180 MMHG | RESPIRATION RATE: 18 BRPM | BODY MASS INDEX: 39.99 KG/M2

## 2021-12-30 DIAGNOSIS — I10 ESSENTIAL HYPERTENSION: ICD-10-CM

## 2021-12-30 DIAGNOSIS — E89.0 HYPOTHYROIDISM, POSTSURGICAL: ICD-10-CM

## 2021-12-30 DIAGNOSIS — F41.8 DEPRESSION WITH ANXIETY: ICD-10-CM

## 2021-12-30 PROCEDURE — 99214 OFFICE O/P EST MOD 30 MIN: CPT | Mod: 95,CR | Performed by: NURSE PRACTITIONER

## 2021-12-30 RX ORDER — LEVOTHYROXINE SODIUM 175 UG/1
175 TABLET ORAL
Qty: 90 TABLET | Refills: 0 | Status: SHIPPED | OUTPATIENT
Start: 2021-12-30

## 2021-12-30 RX ORDER — LISINOPRIL 40 MG/1
40 TABLET ORAL DAILY
Qty: 90 TABLET | Refills: 0 | Status: SHIPPED | OUTPATIENT
Start: 2021-12-30 | End: 2022-10-07

## 2021-12-30 ASSESSMENT — PATIENT HEALTH QUESTIONNAIRE - PHQ9
SUM OF ALL RESPONSES TO PHQ QUESTIONS 1-9: 8
5. POOR APPETITE OR OVEREATING: 0 - NOT AT ALL
CLINICAL INTERPRETATION OF PHQ2 SCORE: 2

## 2021-12-30 ASSESSMENT — FIBROSIS 4 INDEX: FIB4 SCORE: 0.69

## 2021-12-30 NOTE — ASSESSMENT & PLAN NOTE
-Restart lisinopril 40 mg p.o. daily  -Follow-up in 1 month for blood pressure  -MA visit to check her blood pressure machine for accuracy.

## 2021-12-30 NOTE — PROGRESS NOTES
"Virtual Visit: Established Patient   This visit was conducted via Zoom using secure and encrypted videoconferencing technology.   The patient was in a private location in the state of [place].    The patient's identity was confirmed and verbal consent was obtained for this virtual visit.    Subjective:   CC:   Chief Complaint   Patient presents with   • Follow-Up     lab review, refill for levothyroxine would like to switch to synthroid       Brielle Krishnamurthy is a 56 y.o. female presenting for evaluation and management of:    Problem   Essential Hypertension    Chronic in nature.  Patient states that she has been having headaches recently and states that when she took her blood pressure this morning for this appointment she noticed it was 180/120.  Some concern related to her current blood pressure machine as she states that the battery had just been dead and she believes did not there is a potential that it is not particularly accurate though she has been having headaches as such I do believe that her blood pressure is elevated.  Plan is to have her come in to check her blood pressure machine against ours, that we can verify an accurate blood pressure.     Depression With Anxiety    Chronic in nature.  Adrienne states that her anxiety has been severe lately states that she has felt agitated and jittery and states that it has felt much less controlled than normal.  She has not found anything specific that makes it better or worse but states between this and how poorly she has been feeling related to her blood pressure she feels that her thyroid must be \"off\".  Recent numbers for her thyroid labs are overall within normal limits.     Postoperative Hypothyroidism    Chronic in nature. Stable. States lately she has been very tired lately.  Patient states that she has felt like she could take a nap most mornings when she wakes up.  States that she has been sleeping an adequate amount.  Does note that her blood " "pressure today is very high.  States that her cuff was running low on batteries so there is some question as to the accuracy of the blood pressure, states that it is also very old cuff.  Patient denies any chest pain, palpitations, dizziness, but states she has been noticing increasing anxiety as well as feeling \"spacey\" and forgetful.  She states that she has not been feeling hungry but feels like she is still gaining weight.  States that she was surprised to see that her levels were generally within normal limits.           ROS   Denies chest pain, palpitations, dizziness, blurry vision.  Positive for headaches and fatigue    Current medicines (including changes today)  Current Outpatient Medications   Medication Sig Dispense Refill   • levothyroxine (SYNTHROID) 175 MCG Tab Take 1 Tablet by mouth every morning on an empty stomach. BRAND NAME SYNTHROID 90 Tablet 0   • lisinopril (PRINIVIL) 40 MG tablet Take 1 Tablet by mouth every day. 90 Tablet 0   • buPROPion (WELLBUTRIN XL) 150 MG XL tablet Take 1 Tablet by mouth 2 times a day. NEED APPOINTMENT FOR REFILLS 180 Tablet 0   • vitamin A 23969 UNIT capsule Take 10,000 Units by mouth every day.     • Calcium Carbonate-Vit D-Min (CALCIUM 1200 PO) Take  by mouth every day.     • Magnesium 400 MG CAPS Take  by mouth every day.     • Cholecalciferol (VITAMIN D-3) 5000 UNITS TABS Take 2 Tabs by mouth every day.       No current facility-administered medications for this visit.       Patient Active Problem List    Diagnosis Date Noted   • Essential hypertension 04/20/2018   • Morbid obesity with BMI of 40.0-44.9, adult ([facility]) 07/31/2017   • Pure hypercholesterolemia 03/13/2016   • Intramural leiomyoma of uterus 06/16/2014   • Depression with anxiety 11/18/2013   • History of chlamydia infection 11/27/2012   • Postoperative hypothyroidism    • Cervical radiculopathy 07/25/2012        Objective:   BP (!) 180/120   Resp 18   Ht 1.753 m (5' 9\")   Wt 122 kg (270 lb)   LMP " 06/10/2014   BMI 39.87 kg/m²     Physical Exam:  Constitutional: Alert, no distress, well-groomed.  Skin: No rashes in visible areas.  Eye: Round. Conjunctiva clear, lids normal. No icterus.   ENMT: Lips pink without lesions, good dentition, moist mucous membranes. Phonation normal.  Neck: No masses, no thyromegaly. Moves freely without pain.  Respiratory: Unlabored respiratory effort, no cough or audible wheeze  Psych: Alert and oriented x3, normal affect and mood.     Assessment and Plan:   The following treatment plan was discussed:     Problem List Items Addressed This Visit     Depression with anxiety     -Plan to start blood pressure medication and change thyroid medication to brand name as such we will maintain her anxiety medication at the same level.         Essential hypertension     -Restart lisinopril 40 mg p.o. daily  -Follow-up in 1 month for blood pressure  -MA visit to check her blood pressure machine for accuracy.         Relevant Medications    lisinopril (PRINIVIL) 40 MG tablet    Postoperative hypothyroidism     -Patient is requesting to switch to name brand Synthroid as she states this has been more effective for her than the levothyroxine in the past.  -We also discussed the possibility of decreasing her dose, initial plan is to switch to namebrand Synthroid for the next month and then she will follow-up to discuss decreasing her dose.  -She is requesting to add T3 but based on her levels I do not have any indication to add T3 as such we are going to refer her to Dr. Mitzy Caraballo in endocrinology.           Relevant Medications    levothyroxine (SYNTHROID) 175 MCG Tab            Follow-up: Return in about 1 month (around 1/30/2022), or if symptoms worsen or fail to improve, for HTN, thyroid.

## 2021-12-30 NOTE — ASSESSMENT & PLAN NOTE
-Plan to start blood pressure medication and change thyroid medication to brand name as such we will maintain her anxiety medication at the same level.

## 2021-12-30 NOTE — ASSESSMENT & PLAN NOTE
-Patient is requesting to switch to name brand Synthroid as she states this has been more effective for her than the levothyroxine in the past.  -We also discussed the possibility of decreasing her dose, initial plan is to switch to namebrand Synthroid for the next month and then she will follow-up to discuss decreasing her dose.  -She is requesting to add T3 but based on her levels I do not have any indication to add T3 as such we are going to refer her to Dr. Mitzy Caraballo in endocrinology.

## 2022-03-10 DIAGNOSIS — F41.8 DEPRESSION WITH ANXIETY: ICD-10-CM

## 2022-03-10 RX ORDER — BUPROPION HYDROCHLORIDE 150 MG/1
TABLET ORAL
Qty: 180 TABLET | Refills: 0 | Status: SHIPPED | OUTPATIENT
Start: 2022-03-10 | End: 2022-10-07

## 2022-07-18 NOTE — ASSESSMENT & PLAN NOTE
Chronic in nature. Stable. Off medications. States it was in the 120's over 80's.  States that she will start rechecking her blood pressure especially considering the change in her thyroid.  Patient denies any chest pain, palpitations, dizziness, blurry vision.   no rashes, no areas of discoloration , no jaundice present , good turgor

## 2022-10-07 ENCOUNTER — HOSPITAL ENCOUNTER (INPATIENT)
Facility: MEDICAL CENTER | Age: 57
LOS: 3 days | DRG: 064 | End: 2022-10-10
Attending: EMERGENCY MEDICINE | Admitting: INTERNAL MEDICINE
Payer: COMMERCIAL

## 2022-10-07 ENCOUNTER — APPOINTMENT (OUTPATIENT)
Dept: RADIOLOGY | Facility: MEDICAL CENTER | Age: 57
End: 2022-10-07
Payer: COMMERCIAL

## 2022-10-07 ENCOUNTER — APPOINTMENT (OUTPATIENT)
Dept: RADIOLOGY | Facility: MEDICAL CENTER | Age: 57
End: 2022-10-07
Attending: EMERGENCY MEDICINE
Payer: COMMERCIAL

## 2022-10-07 ENCOUNTER — APPOINTMENT (OUTPATIENT)
Dept: RADIOLOGY | Facility: MEDICAL CENTER | Age: 57
DRG: 064 | End: 2022-10-07
Attending: STUDENT IN AN ORGANIZED HEALTH CARE EDUCATION/TRAINING PROGRAM
Payer: COMMERCIAL

## 2022-10-07 ENCOUNTER — HOSPITAL ENCOUNTER (EMERGENCY)
Facility: MEDICAL CENTER | Age: 57
End: 2022-10-07
Attending: EMERGENCY MEDICINE
Payer: COMMERCIAL

## 2022-10-07 VITALS
TEMPERATURE: 96.7 F | SYSTOLIC BLOOD PRESSURE: 191 MMHG | HEART RATE: 70 BPM | WEIGHT: 293 LBS | BODY MASS INDEX: 43.4 KG/M2 | DIASTOLIC BLOOD PRESSURE: 107 MMHG | RESPIRATION RATE: 18 BRPM | OXYGEN SATURATION: 94 % | HEIGHT: 69 IN

## 2022-10-07 DIAGNOSIS — I63.9 CEREBROVASCULAR ACCIDENT (CVA), UNSPECIFIED MECHANISM (HCC): ICD-10-CM

## 2022-10-07 DIAGNOSIS — I61.3 PONTINE HEMORRHAGE (HCC): ICD-10-CM

## 2022-10-07 DIAGNOSIS — I10 ESSENTIAL HYPERTENSION: ICD-10-CM

## 2022-10-07 DIAGNOSIS — E78.00 PURE HYPERCHOLESTEROLEMIA: ICD-10-CM

## 2022-10-07 DIAGNOSIS — I61.9 HEMORRHAGIC CEREBROVASCULAR ACCIDENT (CVA) (HCC): ICD-10-CM

## 2022-10-07 DIAGNOSIS — I10 HYPERTENSION, UNSPECIFIED TYPE: ICD-10-CM

## 2022-10-07 LAB
ABO GROUP BLD: NORMAL
ALBUMIN SERPL BCP-MCNC: 4.8 G/DL (ref 3.2–4.9)
ALBUMIN/GLOB SERPL: 1.5 G/DL
ALP SERPL-CCNC: 85 U/L (ref 30–99)
ALT SERPL-CCNC: 21 U/L (ref 2–50)
ANION GAP SERPL CALC-SCNC: 14 MMOL/L (ref 7–16)
APTT PPP: 31.4 SEC (ref 24.7–36)
AST SERPL-CCNC: 23 U/L (ref 12–45)
BASOPHILS # BLD AUTO: 1.1 % (ref 0–1.8)
BASOPHILS # BLD: 0.1 K/UL (ref 0–0.12)
BILIRUB SERPL-MCNC: 0.4 MG/DL (ref 0.1–1.5)
BLD GP AB SCN SERPL QL: NORMAL
BUN SERPL-MCNC: 13 MG/DL (ref 8–22)
CALCIUM SERPL-MCNC: 9.5 MG/DL (ref 8.4–10.2)
CHLORIDE SERPL-SCNC: 103 MMOL/L (ref 96–112)
CO2 SERPL-SCNC: 24 MMOL/L (ref 20–33)
CREAT SERPL-MCNC: 0.86 MG/DL (ref 0.5–1.4)
EKG IMPRESSION: NORMAL
EOSINOPHIL # BLD AUTO: 0.08 K/UL (ref 0–0.51)
EOSINOPHIL NFR BLD: 0.9 % (ref 0–6.9)
ERYTHROCYTE [DISTWIDTH] IN BLOOD BY AUTOMATED COUNT: 42.7 FL (ref 35.9–50)
GFR SERPLBLD CREATININE-BSD FMLA CKD-EPI: 79 ML/MIN/1.73 M 2
GLOBULIN SER CALC-MCNC: 3.2 G/DL (ref 1.9–3.5)
GLUCOSE BLD STRIP.AUTO-MCNC: 148 MG/DL (ref 65–99)
GLUCOSE SERPL-MCNC: 163 MG/DL (ref 65–99)
HCT VFR BLD AUTO: 44.9 % (ref 37–47)
HGB BLD-MCNC: 14.6 G/DL (ref 12–16)
IMM GRANULOCYTES # BLD AUTO: 0.04 K/UL (ref 0–0.11)
IMM GRANULOCYTES NFR BLD AUTO: 0.4 % (ref 0–0.9)
INR PPP: 1.16 (ref 0.87–1.13)
LYMPHOCYTES # BLD AUTO: 1.37 K/UL (ref 1–4.8)
LYMPHOCYTES NFR BLD: 14.9 % (ref 22–41)
MCH RBC QN AUTO: 25.9 PG (ref 27–33)
MCHC RBC AUTO-ENTMCNC: 32.5 G/DL (ref 33.6–35)
MCV RBC AUTO: 79.6 FL (ref 81.4–97.8)
MONOCYTES # BLD AUTO: 0.34 K/UL (ref 0–0.85)
MONOCYTES NFR BLD AUTO: 3.7 % (ref 0–13.4)
NEUTROPHILS # BLD AUTO: 7.24 K/UL (ref 2–7.15)
NEUTROPHILS NFR BLD: 79 % (ref 44–72)
NRBC # BLD AUTO: 0 K/UL
NRBC BLD-RTO: 0 /100 WBC
PLATELET # BLD AUTO: 314 K/UL (ref 164–446)
PMV BLD AUTO: 10 FL (ref 9–12.9)
POTASSIUM SERPL-SCNC: 4.1 MMOL/L (ref 3.6–5.5)
PROT SERPL-MCNC: 8 G/DL (ref 6–8.2)
PROTHROMBIN TIME: 14.3 SEC (ref 12–14.6)
RBC # BLD AUTO: 5.64 M/UL (ref 4.2–5.4)
RH BLD: NORMAL
SODIUM SERPL-SCNC: 141 MMOL/L (ref 135–145)
T4 FREE SERPL-MCNC: 1.3 NG/DL (ref 0.93–1.7)
TROPONIN T SERPL-MCNC: 10 NG/L (ref 6–19)
TSH SERPL DL<=0.005 MIU/L-ACNC: 8.59 UIU/ML (ref 0.38–5.33)
WBC # BLD AUTO: 9.2 K/UL (ref 4.8–10.8)

## 2022-10-07 PROCEDURE — 99223 1ST HOSP IP/OBS HIGH 75: CPT | Performed by: STUDENT IN AN ORGANIZED HEALTH CARE EDUCATION/TRAINING PROGRAM

## 2022-10-07 PROCEDURE — 84484 ASSAY OF TROPONIN QUANT: CPT

## 2022-10-07 PROCEDURE — 99291 CRITICAL CARE FIRST HOUR: CPT

## 2022-10-07 PROCEDURE — 96374 THER/PROPH/DIAG INJ IV PUSH: CPT

## 2022-10-07 PROCEDURE — 700101 HCHG RX REV CODE 250: Performed by: EMERGENCY MEDICINE

## 2022-10-07 PROCEDURE — 80053 COMPREHEN METABOLIC PANEL: CPT

## 2022-10-07 PROCEDURE — 85025 COMPLETE CBC W/AUTO DIFF WBC: CPT

## 2022-10-07 PROCEDURE — 70551 MRI BRAIN STEM W/O DYE: CPT

## 2022-10-07 PROCEDURE — 700101 HCHG RX REV CODE 250

## 2022-10-07 PROCEDURE — 99285 EMERGENCY DEPT VISIT HI MDM: CPT

## 2022-10-07 PROCEDURE — 96365 THER/PROPH/DIAG IV INF INIT: CPT

## 2022-10-07 PROCEDURE — 770022 HCHG ROOM/CARE - ICU (200)

## 2022-10-07 PROCEDURE — 70450 CT HEAD/BRAIN W/O DYE: CPT

## 2022-10-07 PROCEDURE — 71045 X-RAY EXAM CHEST 1 VIEW: CPT

## 2022-10-07 PROCEDURE — 85610 PROTHROMBIN TIME: CPT

## 2022-10-07 PROCEDURE — 700111 HCHG RX REV CODE 636 W/ 250 OVERRIDE (IP): Performed by: EMERGENCY MEDICINE

## 2022-10-07 PROCEDURE — 96376 TX/PRO/DX INJ SAME DRUG ADON: CPT

## 2022-10-07 PROCEDURE — 96375 TX/PRO/DX INJ NEW DRUG ADDON: CPT

## 2022-10-07 PROCEDURE — 86850 RBC ANTIBODY SCREEN: CPT

## 2022-10-07 PROCEDURE — 94760 N-INVAS EAR/PLS OXIMETRY 1: CPT

## 2022-10-07 PROCEDURE — 82962 GLUCOSE BLOOD TEST: CPT

## 2022-10-07 PROCEDURE — 700105 HCHG RX REV CODE 258: Performed by: NURSE PRACTITIONER

## 2022-10-07 PROCEDURE — 0042T CT-CEREBRAL PERFUSION ANALYSIS: CPT

## 2022-10-07 PROCEDURE — 85730 THROMBOPLASTIN TIME PARTIAL: CPT

## 2022-10-07 PROCEDURE — 700105 HCHG RX REV CODE 258: Performed by: EMERGENCY MEDICINE

## 2022-10-07 PROCEDURE — 84439 ASSAY OF FREE THYROXINE: CPT

## 2022-10-07 PROCEDURE — 700117 HCHG RX CONTRAST REV CODE 255: Performed by: EMERGENCY MEDICINE

## 2022-10-07 PROCEDURE — 96366 THER/PROPH/DIAG IV INF ADDON: CPT

## 2022-10-07 PROCEDURE — 70496 CT ANGIOGRAPHY HEAD: CPT

## 2022-10-07 PROCEDURE — 93005 ELECTROCARDIOGRAM TRACING: CPT | Performed by: EMERGENCY MEDICINE

## 2022-10-07 PROCEDURE — 86901 BLOOD TYPING SEROLOGIC RH(D): CPT

## 2022-10-07 PROCEDURE — 80061 LIPID PANEL: CPT

## 2022-10-07 PROCEDURE — 84443 ASSAY THYROID STIM HORMONE: CPT

## 2022-10-07 PROCEDURE — 70498 CT ANGIOGRAPHY NECK: CPT

## 2022-10-07 PROCEDURE — 86900 BLOOD TYPING SEROLOGIC ABO: CPT

## 2022-10-07 PROCEDURE — 36415 COLL VENOUS BLD VENIPUNCTURE: CPT

## 2022-10-07 PROCEDURE — 83036 HEMOGLOBIN GLYCOSYLATED A1C: CPT

## 2022-10-07 RX ORDER — MORPHINE SULFATE 4 MG/ML
4 INJECTION INTRAVENOUS ONCE
Status: COMPLETED | OUTPATIENT
Start: 2022-10-07 | End: 2022-10-07

## 2022-10-07 RX ORDER — ENALAPRILAT 1.25 MG/ML
1.25 INJECTION INTRAVENOUS EVERY 6 HOURS PRN
Status: DISCONTINUED | OUTPATIENT
Start: 2022-10-07 | End: 2022-10-10 | Stop reason: HOSPADM

## 2022-10-07 RX ORDER — ONDANSETRON 2 MG/ML
4 INJECTION INTRAMUSCULAR; INTRAVENOUS ONCE
Status: DISCONTINUED | OUTPATIENT
Start: 2022-10-07 | End: 2022-10-07 | Stop reason: HOSPADM

## 2022-10-07 RX ORDER — ONDANSETRON 4 MG/1
4 TABLET, ORALLY DISINTEGRATING ORAL EVERY 4 HOURS PRN
Status: DISCONTINUED | OUTPATIENT
Start: 2022-10-07 | End: 2022-10-07

## 2022-10-07 RX ORDER — LABETALOL HYDROCHLORIDE 5 MG/ML
10 INJECTION, SOLUTION INTRAVENOUS ONCE
Status: COMPLETED | OUTPATIENT
Start: 2022-10-07 | End: 2022-10-07

## 2022-10-07 RX ORDER — SODIUM CHLORIDE 9 MG/ML
INJECTION, SOLUTION INTRAVENOUS CONTINUOUS
Status: DISCONTINUED | OUTPATIENT
Start: 2022-10-07 | End: 2022-10-08

## 2022-10-07 RX ORDER — BISACODYL 10 MG
10 SUPPOSITORY, RECTAL RECTAL
Status: DISCONTINUED | OUTPATIENT
Start: 2022-10-07 | End: 2022-10-10 | Stop reason: HOSPADM

## 2022-10-07 RX ORDER — ONDANSETRON 2 MG/ML
4 INJECTION INTRAMUSCULAR; INTRAVENOUS ONCE
Status: COMPLETED | OUTPATIENT
Start: 2022-10-07 | End: 2022-10-07

## 2022-10-07 RX ORDER — AMOXICILLIN 250 MG
2 CAPSULE ORAL 2 TIMES DAILY
Status: DISCONTINUED | OUTPATIENT
Start: 2022-10-07 | End: 2022-10-10 | Stop reason: HOSPADM

## 2022-10-07 RX ORDER — LABETALOL HYDROCHLORIDE 5 MG/ML
10 INJECTION, SOLUTION INTRAVENOUS EVERY 4 HOURS PRN
Status: DISCONTINUED | OUTPATIENT
Start: 2022-10-07 | End: 2022-10-10 | Stop reason: HOSPADM

## 2022-10-07 RX ORDER — LORAZEPAM 2 MG/ML
2 INJECTION INTRAMUSCULAR
Status: DISCONTINUED | OUTPATIENT
Start: 2022-10-07 | End: 2022-10-10 | Stop reason: HOSPADM

## 2022-10-07 RX ORDER — HYDRALAZINE HYDROCHLORIDE 20 MG/ML
10 INJECTION INTRAMUSCULAR; INTRAVENOUS EVERY 4 HOURS PRN
Status: DISCONTINUED | OUTPATIENT
Start: 2022-10-07 | End: 2022-10-10 | Stop reason: HOSPADM

## 2022-10-07 RX ORDER — BUPROPION HYDROCHLORIDE 150 MG/1
300 TABLET ORAL EVERY EVENING
Status: SHIPPED | COMMUNITY
End: 2022-10-13

## 2022-10-07 RX ORDER — ACETAMINOPHEN 325 MG/1
650 TABLET ORAL EVERY 4 HOURS PRN
Status: DISCONTINUED | OUTPATIENT
Start: 2022-10-07 | End: 2022-10-10 | Stop reason: HOSPADM

## 2022-10-07 RX ORDER — ONDANSETRON 2 MG/ML
4 INJECTION INTRAMUSCULAR; INTRAVENOUS EVERY 4 HOURS PRN
Status: DISCONTINUED | OUTPATIENT
Start: 2022-10-07 | End: 2022-10-07

## 2022-10-07 RX ORDER — IBUPROFEN 200 MG
400-600 TABLET ORAL EVERY 6 HOURS PRN
Status: ON HOLD | COMMUNITY
End: 2022-10-10

## 2022-10-07 RX ORDER — ACETAMINOPHEN 650 MG/1
650 SUPPOSITORY RECTAL EVERY 4 HOURS PRN
Status: DISCONTINUED | OUTPATIENT
Start: 2022-10-07 | End: 2022-10-10 | Stop reason: HOSPADM

## 2022-10-07 RX ORDER — POLYETHYLENE GLYCOL 3350 17 G/17G
1 POWDER, FOR SOLUTION ORAL
Status: DISCONTINUED | OUTPATIENT
Start: 2022-10-07 | End: 2022-10-10 | Stop reason: HOSPADM

## 2022-10-07 RX ORDER — LABETALOL HYDROCHLORIDE 5 MG/ML
20 INJECTION, SOLUTION INTRAVENOUS ONCE
Status: COMPLETED | OUTPATIENT
Start: 2022-10-07 | End: 2022-10-07

## 2022-10-07 RX ORDER — LABETALOL HYDROCHLORIDE 5 MG/ML
INJECTION, SOLUTION INTRAVENOUS
Status: COMPLETED
Start: 2022-10-07 | End: 2022-10-07

## 2022-10-07 RX ORDER — HYDROMORPHONE HYDROCHLORIDE 1 MG/ML
1 INJECTION, SOLUTION INTRAMUSCULAR; INTRAVENOUS; SUBCUTANEOUS ONCE
Status: COMPLETED | OUTPATIENT
Start: 2022-10-07 | End: 2022-10-07

## 2022-10-07 RX ORDER — DEXTROSE MONOHYDRATE 25 G/50ML
25 INJECTION, SOLUTION INTRAVENOUS
Status: DISCONTINUED | OUTPATIENT
Start: 2022-10-07 | End: 2022-10-10 | Stop reason: HOSPADM

## 2022-10-07 RX ADMIN — LABETALOL HYDROCHLORIDE 10 MG: 5 INJECTION INTRAVENOUS at 14:08

## 2022-10-07 RX ADMIN — SODIUM CHLORIDE: 9 INJECTION, SOLUTION INTRAVENOUS at 22:30

## 2022-10-07 RX ADMIN — ONDANSETRON 4 MG: 2 INJECTION INTRAMUSCULAR; INTRAVENOUS at 19:22

## 2022-10-07 RX ADMIN — LABETALOL HYDROCHLORIDE 10 MG: 5 INJECTION INTRAVENOUS at 13:34

## 2022-10-07 RX ADMIN — SODIUM CHLORIDE 5 MG/HR: 9 INJECTION, SOLUTION INTRAVENOUS at 15:53

## 2022-10-07 RX ADMIN — HYDROMORPHONE HYDROCHLORIDE 1 MG: 1 INJECTION, SOLUTION INTRAMUSCULAR; INTRAVENOUS; SUBCUTANEOUS at 14:44

## 2022-10-07 RX ADMIN — LABETALOL HYDROCHLORIDE 20 MG: 5 INJECTION INTRAVENOUS at 14:40

## 2022-10-07 RX ADMIN — MORPHINE SULFATE 4 MG: 4 INJECTION INTRAVENOUS at 14:05

## 2022-10-07 RX ADMIN — ONDANSETRON 4 MG: 2 INJECTION INTRAMUSCULAR; INTRAVENOUS at 13:32

## 2022-10-07 RX ADMIN — LABETALOL HYDROCHLORIDE 10 MG: 5 INJECTION, SOLUTION INTRAVENOUS at 13:34

## 2022-10-07 RX ADMIN — IOHEXOL 120 ML: 350 INJECTION, SOLUTION INTRAVENOUS at 13:54

## 2022-10-07 ASSESSMENT — LIFESTYLE VARIABLES
ON A TYPICAL DAY WHEN YOU DRINK ALCOHOL HOW MANY DRINKS DO YOU HAVE: 0
EVER HAD A DRINK FIRST THING IN THE MORNING TO STEADY YOUR NERVES TO GET RID OF A HANGOVER: NO
TOTAL SCORE: 0
HAVE PEOPLE ANNOYED YOU BY CRITICIZING YOUR DRINKING: NO
EVER FELT BAD OR GUILTY ABOUT YOUR DRINKING: NO
TOTAL SCORE: 0
AVERAGE NUMBER OF DAYS PER WEEK YOU HAVE A DRINK CONTAINING ALCOHOL: 0
HAVE YOU EVER FELT YOU SHOULD CUT DOWN ON YOUR DRINKING: NO
CONSUMPTION TOTAL: NEGATIVE
HOW MANY TIMES IN THE PAST YEAR HAVE YOU HAD 5 OR MORE DRINKS IN A DAY: 0
TOTAL SCORE: 0
ALCOHOL_USE: NO

## 2022-10-07 ASSESSMENT — COGNITIVE AND FUNCTIONAL STATUS - GENERAL
SUGGESTED CMS G CODE MODIFIER DAILY ACTIVITY: CH
DAILY ACTIVITIY SCORE: 24
SUGGESTED CMS G CODE MODIFIER MOBILITY: CH
MOBILITY SCORE: 24

## 2022-10-07 ASSESSMENT — FIBROSIS 4 INDEX
FIB4 SCORE: 0.9
FIB4 SCORE: 0.7

## 2022-10-07 NOTE — ED NOTES
" MD aware of pt's continued c/o H/A  Now 7/10  \"feels like my migraines I get at home\" c/o pain going down neck    BP also continues to be high   Orders rec'ed for med's   "

## 2022-10-07 NOTE — ED NOTES
Pt informed by MD she will be transferred to [facility] ICU for CVA symptoms and results of CT  IV med's given for c/o pain and BP control

## 2022-10-07 NOTE — ED PROVIDER NOTES
"ED Provider Note    CHIEF COMPLAINT  Chief Complaint   Patient presents with    Dizziness     Acute onset 1 hr ago    N/V     Emesis x 20 \" every time I move \"    Headache     Currently resolved Denies CP or SOB  Hx HTN and on meds \" they are not touching it \"    Numbness     Lips and LUE Mild  Denies all other stroke S/S       HPI  Brielle Krishnamurthy is a 57 y.o. female who presents with a chief complaint of dizziness.  She describes sudden onset.  Severe.  Worse when she her eyes are open and she moves.  Better when she stays still.  Continue less.  With exacerbating symptoms.  She states that there has been no new numbness or tingling except she had sudden onset of facial numbness which is now resolved.  There is been no weakness in her arms or legs but difficult to walk because she is so dizzy.    Patient has a history of hypertension she has not been taking lisinopril because it has not been working.  In addition, the patient states she was at work had sudden onset.  And blood pressure was noted to be significantly elevated.    REVIEW OF SYSTEMS  No fever no chills no sore throat no trouble pressure.  No chest pain    PAST MEDICAL HISTORY  Past Medical History:   Diagnosis Date    HTN (hypertension) 07-29-11    has been on Lisinopril, off at this time    Clostridium difficile infection 2011    Hypothyroidism, postsurgical 2011    benign thyroid nodules; thyroidectomy    Anemia     Other specified disorder of intestines     constipation    Other specified symptom associated with female genital organs        FAMILY HISTORY  Family History   Problem Relation Age of Onset    Hypertension Mother     Cancer Other     Heart Disease Other     Stroke Other        SOCIAL HISTORY  Social History     Socioeconomic History    Marital status:    Tobacco Use    Smoking status: Never    Smokeless tobacco: Never    Tobacco comments:     occasional social smoker   Substance and Sexual Activity    Alcohol use: Yes     " "Comment: occasionally    Drug use: No    Sexual activity: Never       SURGICAL HISTORY  Past Surgical History:   Procedure Laterality Date    VAGINAL HYSTERECTOMY SCOPE TOTAL  6/16/2014    Performed by Kelechi Ojeda M.D. at SURGERY [facility] ORS    THYROIDECTOMY TOTAL  8/3/2011    Performed by REBEKA FORD at SURGERY SAME DAY [facility] ORS    SUBMANDIBLE ABSCESS INCISION AND DRAINAGE  7/6/2011    Performed by REBEKA HAYS at SURGERY [facility] ORS    DENTAL EXTRACTION(S)  7/6/2011    Performed by REBEKA HAYS at SURGERY [facility]       CURRENT MEDICATIONS  No current facility-administered medications on file prior to encounter.     Current Outpatient Medications on File Prior to Encounter   Medication Sig Dispense Refill    buPROPion (WELLBUTRIN XL) 150 MG XL tablet TAKE 1 TABLET BY MOUTH TWICE DAILY 180 Tablet 0    levothyroxine (SYNTHROID) 175 MCG Tab Take 1 Tablet by mouth every morning on an empty stomach. BRAND NAME SYNTHROID 90 Tablet 0    lisinopril (PRINIVIL) 40 MG tablet Take 1 Tablet by mouth every day. 90 Tablet 0    vitamin A 87472 UNIT capsule Take 10,000 Units by mouth every day.      Calcium Carbonate-Vit D-Min (CALCIUM 1200 PO) Take  by mouth every day.      Magnesium 400 MG CAPS Take  by mouth every day.      Cholecalciferol (VITAMIN D-3) 5000 UNITS TABS Take 2 Tabs by mouth every day.         ALLERGIES  No Known Allergies    PHYSICAL EXAM  VITAL SIGNS: BP (!) 212/143   Pulse 73   Temp 35.9 °C (96.7 °F) (Temporal)   Resp 16   Ht 1.753 m (5' 9\")   Wt (!) 138 kg (304 lb 10.8 oz)   LMP 06/10/2014   SpO2 98%   BMI 44.99 kg/m²       Constitutional: Well developed, Well nourished, No acute distress, Non-toxic appearance.   HENT: Normocephalic atraumatic.  No facial droop noted.  Wet oral pharynx.  Eyes: Pulls equal round reactive anicteric sclera visual fields are grossly intact  Neck: Normal range of motion, No tenderness, Supple, No stridor.   Cardiovascular: Normal heart " rate, Normal rhythm, No murmurs, No rubs, No gallops.   Thorax & Lungs: Normal breath sounds, No respiratory distress, No wheezing, No chest tenderness.   Abdomen: Bowel sounds normal, Soft, No tenderness, No masses, No pulsatile masses.   Skin: Warm, Dry, No erythema, No rash.   Back: No tenderness, No CVA tenderness.   Extremities: Intact distal pulses, No edema, No tenderness, No cyanosis, No clubbing.   Neurologic: Stroke scale of 0 at this time.  Unable to assess straight leg strength.  Patient has no facial droop no tongue deviation good visual field test grossly no dysarthria good recall ANO x3 good station is in all extremities including the face.  Psychiatric: Affect normal, Judgment normal, Mood normal.       RADIOLOGY/PROCEDURES  CT-CEREBRAL PERFUSION ANALYSIS   Final Result      1.  Cerebral blood flow less than 30% likely representing completed infarct = 0 mL.      2.  T Max more than 6 seconds likely representing combination of completed infarct and ischemia = 0 mL.      3.  Mismatched volume likely representing ischemic brain/penumbra = None      4.  Please note that the cerebral perfusion was performed on the limited brain tissue around the basal ganglia region. Infarct/ischemia outside the CT perfusion sections can be missed in this study.      CT-CTA HEAD WITH & W/O-POST PROCESS   Final Result      No intracranial aneurysm, focal stenosis, or abrupt large vessel cut off.      CT-CTA NECK WITH & W/O-POST PROCESSING   Final Result      1.  No focal stenosis, dissection or occlusion of the cervical carotid or vertebral arteries.   2.  Hyperdense nodule overlying the thyroid cartilage, likely ectopic thyroid tissue.  Thyroid neoplasm is difficult to entirely exclude.      DX-CHEST-PORTABLE (1 VIEW)   Final Result      Cardiomegaly.      CT-HEAD W/O   Final Result   Addendum (preliminary) 1 of 1   These findings were discussed with NANDO SERRATO on 10/7/2022    1:55 PM.      Final      1.   Punctate intraparenchymal hemorrhage in the dorsal nicloa slightly RIGHT of midline.   2.  Mild diffuse atrophy and white matter microvascular ischemic changes.           Results for orders placed or performed during the hospital encounter of 10/07/22   CBC WITH DIFFERENTIAL   Result Value Ref Range    WBC 9.2 4.8 - 10.8 K/uL    RBC 5.64 (H) 4.20 - 5.40 M/uL    Hemoglobin 14.6 12.0 - 16.0 g/dL    Hematocrit 44.9 37.0 - 47.0 %    MCV 79.6 (L) 81.4 - 97.8 fL    MCH 25.9 (L) 27.0 - 33.0 pg    MCHC 32.5 (L) 33.6 - 35.0 g/dL    RDW 42.7 35.9 - 50.0 fL    Platelet Count 314 164 - 446 K/uL    MPV 10.0 9.0 - 12.9 fL    Neutrophils-Polys 79.00 (H) 44.00 - 72.00 %    Lymphocytes 14.90 (L) 22.00 - 41.00 %    Monocytes 3.70 0.00 - 13.40 %    Eosinophils 0.90 0.00 - 6.90 %    Basophils 1.10 0.00 - 1.80 %    Immature Granulocytes 0.40 0.00 - 0.90 %    Nucleated RBC 0.00 /100 WBC    Neutrophils (Absolute) 7.24 (H) 2.00 - 7.15 K/uL    Lymphs (Absolute) 1.37 1.00 - 4.80 K/uL    Monos (Absolute) 0.34 0.00 - 0.85 K/uL    Eos (Absolute) 0.08 0.00 - 0.51 K/uL    Baso (Absolute) 0.10 0.00 - 0.12 K/uL    Immature Granulocytes (abs) 0.04 0.00 - 0.11 K/uL    NRBC (Absolute) 0.00 K/uL   COMP METABOLIC PANEL   Result Value Ref Range    Sodium 141 135 - 145 mmol/L    Potassium 4.1 3.6 - 5.5 mmol/L    Chloride 103 96 - 112 mmol/L    Co2 24 20 - 33 mmol/L    Anion Gap 14.0 7.0 - 16.0    Glucose 163 (H) 65 - 99 mg/dL    Bun 13 8 - 22 mg/dL    Creatinine 0.86 0.50 - 1.40 mg/dL    Calcium 9.5 8.4 - 10.2 mg/dL    AST(SGOT) 23 12 - 45 U/L    ALT(SGPT) 21 2 - 50 U/L    Alkaline Phosphatase 85 30 - 99 U/L    Total Bilirubin 0.4 0.1 - 1.5 mg/dL    Albumin 4.8 3.2 - 4.9 g/dL    Total Protein 8.0 6.0 - 8.2 g/dL    Globulin 3.2 1.9 - 3.5 g/dL    A-G Ratio 1.5 g/dL   PROTHROMBIN TIME   Result Value Ref Range    PT 14.3 12.0 - 14.6 sec    INR 1.16 (H) 0.87 - 1.13   APTT   Result Value Ref Range    APTT 31.4 24.7 - 36.0 sec   COD (ADULT)   Result Value Ref  Range    ABO Grouping Only A     Rh Grouping Only POS    TROPONIN   Result Value Ref Range    Troponin T 10 6 - 19 ng/L   ESTIMATED GFR   Result Value Ref Range    GFR (CKD-EPI) 79 >60 mL/min/1.73 m 2   EKG (NOW)   Result Value Ref Range    Report       [facility] Emergency Dept.    Test Date:  2022-10-07  Pt Name:    NADEEM STEWARD               Department: EDS  MRN:        [id number]                      Room:       -ROOM 3  Gender:     Female                       Technician: [id number]  :        1965                   Requested By:ER TRIAGE PROTOCOL  Order #:    [id number]                    Reading MD: Sang SERRATO MD    Measurements  Intervals                                Axis  Rate:       71                           P:          38  ME:         156                          QRS:        -13  QRSD:       148                          T:          5  QT:         473  QTc:        515    Interpretive Statements  Rate of 71.  Normal ME borderline QRS.  No ST segment elevations or  depressions  abnormal no acute ischemic findings  Electronically Signed On 10-7-2022 14:11:11 PDT by Sang SERRATO MD     POCT glucose device results   Result Value Ref Range    POC Glucose, Blood 148 (H) 65 - 99 mg/dL        COURSE & MEDICAL DECISION MAKING  Pertinent Labs & Imaging studies reviewed. (See chart for details)  The patient who comes in with possible hypertensive emergency versus less stroke versus atypical migraine a high blood pressure among others rated slightly subtle the patient is still possible have a cerebral infarct.  Because of the sudden onset the 1 hour even the patient does not not have has a stroke scale 0 and I could not do a stroke study.  The patient is ready going to CT scan after my history and physical    Plan  CT activated stroke work-up activated  CBC and  Metabolic panel  Pregnancy  EKG  Lab work  Zofran  Lopressor 10 mg    1:55 PM  I was notified by  the radiologist the patient has a hemorrhagic stroke.  At this we will transfer the patient.  In addition we will call Dr. Sinha    I have informed the patient patient's blood pressure still elevated she cannot dose of labetalol.  In addition she has a headache some morphine and Zofran given hope that that will help her blood pressure go down as well    This is a 57-year-old female with sudden onset of dizziness CT scan stroke shows a hemorrhagic stroke.  At this point I spoken to Dr. Sinha this was informed to me by the radiologist.  There are some possibility that this may not be a stroke.  Regardless the patient is symptomatic dizzy hypertensive.  I were managing her blood pressure with IV boluses of labetalol.  Were hoping it can come down.  If not we will start on a drip.    This patient is a very ill patient with significant disease that could cause significant mortality and/or morbidity.  Patient required multiple reevaluations discussion with Dr. Sinha evaluation with a CT scan discussion with the radiologist critical care time was approximately 40 minutes.    FINAL IMPRESSION  1.  Hemorrhagic CVA  2.  Critical care time and 40 minutes  3.      Electronically signed by: Sang Cheney M.D., 10/7/2022 1:31 PM

## 2022-10-07 NOTE — DISCHARGE PLANNING
Anticipated Discharge Disposition: [facility] ER    Action: Will transfer from [facility] ER Dr Cheney via [facility]  ER to [facility] ER with Dr Moffett. Chart copy will be sent. PCS completed.    Barriers to Discharge: Unknown at this time [facility] ETA pending    Plan: Will cont to follow

## 2022-10-07 NOTE — ED NOTES
"Med rec completed by med rec tech at [facility] on 10/7/2022, prior to transfer:    \"~Medication reconciliation updated and complete per patient at bedside  ~Allergies have been verified   ~No oral ABX within the last 30 days  ~Patient home pharmacy: [facility]\"  "

## 2022-10-07 NOTE — ED NOTES
BUDDY here for transferring pt to [facility] ED for further care and treatment of CVA   Called report to [facility] and spoke to Joanna VIERA  She is aware of pt's impending arrival

## 2022-10-07 NOTE — ED NOTES
Pt went to CT and is now back  Remains A,A, and O x3  Ambulatory for scan  No deficits noted at present  IV med's were given  Noted BP continues to be high  Will inform MD

## 2022-10-07 NOTE — ED NOTES
MD at BS  Stroke called  SL placed  Pt alert, awake and Raymond X 3  No deficits noted at present per MD

## 2022-10-07 NOTE — ED TRIAGE NOTES
BIBA from [facility] for Hemorraghic nicola stroke.  Found to be 's @ [facility]. Pt @ [facility] around 1030 AM had dizziness w/ N/V and HA.  Arrives a[place]ryn, MIKE ROTIZ.  HA 4/10 currently, denies N/V.

## 2022-10-07 NOTE — CONSULTS
Neurology Initial Consult H&P  Neurohospitalist Service, [facility]    Referring Physician: Dominick Ahuja M.D.    Chief Complaint   Patient presents with    Nausea    Headache    Dizziness       HPI: Brielle Krishnamurthy is a 57 y.o. woman with a history of hypertension who presented to [facility] with acute onset dizziness and lightheadedness.  She states that she felt in her usual state of health this morning when she suddenly developed lightheadedness and a sensation of the world spinning around her.  She said that she almost fell to her knees and had to sit down.    She states that when she moves her body in any direction symptoms get much worse.  It caused her to have nausea with vomiting while at work.  She was transferred to [facility] after CT head at [facility] was concerning for possible pontine intraparenchymal hemorrhage.    Patient denies any unilateral weakness, numbness, loss of consciousness, confusion, difficulty speaking, difficulty swallowing.    Blood pressure at the outside hospital was noted to be greater than 180 systolic.    Review of systems: In addition to what is detailed in the HPI above, all other systems reviewed and are negative.    Past Medical History:    has a past medical history of Anemia, Clostridium difficile infection (2011), HTN (hypertension) (07-29-11), Hypothyroidism, postsurgical (2011), Other specified disorder of intestines, and Other specified symptom associated with female genital organs.    She has no past medical history of Anesthesia or Breast cancer (HCC).    FHx:  family history includes Cancer in an other family member; Heart Disease in an other family member; Hypertension in her mother; Stroke in an other family member.    SHx:   reports that she has never smoked. She has never used smokeless tobacco. She reports current alcohol use. She reports that she does not use drugs.    Allergies:  No Known  "Allergies    Medications:    Current Facility-Administered Medications:     niCARdipine (CARDENE) 25 mg in  mL Infusion, 0-15 mg/hr, Intravenous, Continuous, Jensen Wilkins D.O.    Current Outpatient Medications:     buPROPion (WELLBUTRIN XL) 150 MG XL tablet, Take 300 mg by mouth every evening., Disp: , Rfl:     Multiple Vitamins-Minerals (ZINC PO), Take 1 Tablet by mouth every morning., Disp: , Rfl:     SELENIUM PO, Take 1 Tablet by mouth every morning., Disp: , Rfl:     Ascorbic Acid (VITAMIN C PO), Take 1 Tablet by mouth every evening., Disp: , Rfl:     ibuprofen (MOTRIN) 200 MG Tab, Take 400-600 mg by mouth every 6 hours as needed for Headache., Disp: , Rfl:     levothyroxine (SYNTHROID) 175 MCG Tab, Take 1 Tablet by mouth every morning on an empty stomach. BRAND NAME SYNTHROID, Disp: 90 Tablet, Rfl: 0    Magnesium 400 MG CAPS, Take 400 mg by mouth every morning., Disp: , Rfl:     Cholecalciferol (VITAMIN D-3) 5000 UNITS TABS, Take 2 Tablets by mouth every evening., Disp: , Rfl:     Physical Examination:     General: Patient is awake and in no acute distress  Eye: Examination of optic disks not indicated at this time given acuity of consult  Neck: There is normal range of motion  CV: regular rate   Extremities:  clear, dry, intact, without peripheral edema    NEUROLOGICAL EXAM:     BP (!) 181/95   Pulse (!) 59   Temp 35.9 °C (96.7 °F) (Temporal)   Resp 16   Ht 1.753 m (5' 9\")   Wt 122 kg (270 lb)   LMP 06/10/2014   SpO2 96%   BMI 39.87 kg/m²       Mental status: Awake, alert and fully oriented  Speech and language: Speech is clear and fluent. The patient is able to name and repeat, and follow commands  Cranial nerve exam: Pupils are equal, round and reactive to light bilaterally. Visual fields are full. There is no nystagmus. Extraocular muscles are intact. Face is symmetric. Sensation in the face is intact to light touch. Palate elevates symmetrically. Tongue is midline.  Motor exam: " Power 5/5 throughout all extremities. There is sustained antigravity with no downward drift in bilateral arms and legs.  There is no pronator drift. Tone is normal. No abnormal movements were seen on exam.  Sensory exam: Reacts to tactile and pinprick in all 4 extremities, no neglect to double stim   Deep tendon reflexes:  2+ throughout. Toes down-going bilaterally.  Coordination: No ataxia on bilateral finger-to-nose testing  Gait: Deferred due to patient preference    NIH equals 0 3:45 PM      Objective Data:    Labs:  Lab Results   Component Value Date/Time    PROTHROMBTM 14.3 10/07/2022 01:20 PM    INR 1.16 (H) 10/07/2022 01:20 PM      Lab Results   Component Value Date/Time    WBC 9.2 10/07/2022 01:20 PM    RBC 5.64 (H) 10/07/2022 01:20 PM    HEMOGLOBIN 14.6 10/07/2022 01:20 PM    HEMATOCRIT 44.9 10/07/2022 01:20 PM    MCV 79.6 (L) 10/07/2022 01:20 PM    MCH 25.9 (L) 10/07/2022 01:20 PM    MCHC 32.5 (L) 10/07/2022 01:20 PM    MPV 10.0 10/07/2022 01:20 PM    NEUTSPOLYS 79.00 (H) 10/07/2022 01:20 PM    LYMPHOCYTES 14.90 (L) 10/07/2022 01:20 PM    MONOCYTES 3.70 10/07/2022 01:20 PM    EOSINOPHILS 0.90 10/07/2022 01:20 PM    BASOPHILS 1.10 10/07/2022 01:20 PM    HYPOCHROMIA 2+ 06/17/2014 03:32 AM    ANISOCYTOSIS 1+ 06/17/2014 03:32 AM      Lab Results   Component Value Date/Time    SODIUM 141 10/07/2022 01:20 PM    POTASSIUM 4.1 10/07/2022 01:20 PM    CHLORIDE 103 10/07/2022 01:20 PM    CO2 24 10/07/2022 01:20 PM    GLUCOSE 163 (H) 10/07/2022 01:20 PM    BUN 13 10/07/2022 01:20 PM    CREATININE 0.86 10/07/2022 01:20 PM    CREATININE 0.7 05/24/2005 02:00 PM      Lab Results   Component Value Date/Time    CHOLSTRLTOT 260 (H) 05/20/2021 01:29 PM     (H) 05/20/2021 01:29 PM    HDL 46 05/20/2021 01:29 PM    TRIGLYCERIDE 220 (H) 05/20/2021 01:29 PM       Lab Results   Component Value Date/Time    ALKPHOSPHAT 85 10/07/2022 01:20 PM    ASTSGOT 23 10/07/2022 01:20 PM    ALTSGPT 21 10/07/2022 01:20 PM    TBILIRUBIN  0.4 10/07/2022 01:20 PM        Imaging/Testing:    I interpreted and/or reviewed the patient's neuroimaging    MR-BRAIN-W/O    (Results Pending)       Assessment and Plan:    Brielle Krishnamurthy is a 57 y.o. woman with history of hypertension who presents with acute onset lightheadedness and vertigo and hypertension in the setting of a CT head showing possible pontine hemorrhage.  I reviewed the CT imaging and I am not convinced that the findings do not represent very small chronic calcifications in the nicola.    For now, recommend blood pressure control with systolic goal less than 160.  I have ordered a stat MRI to definitively rule in or out pontine hemorrhage.  If this is normal, the patient may be observed and sent home after adequate blood pressure control.    If MRI does show hemorrhage, will recommend admission to the ICU for every hour neurochecks and vitals with more strict blood pressure control.      The evaluation of the patient, and recommended management, was discussed with the resident staff.     Rex Sinha D.O.  Neurohospitalist, Acute Care Services

## 2022-10-07 NOTE — ED NOTES
Pharmacy Medication Reconciliation      ~Medication reconciliation updated and complete per patient at bedside  ~Allergies have been verified   ~No oral ABX within the last 30 days  ~Patient home pharmacy: [facility]

## 2022-10-08 PROBLEM — I61.3 PONTINE HEMORRHAGE (HCC): Status: ACTIVE | Noted: 2022-10-08

## 2022-10-08 PROBLEM — D64.9 ANEMIA: Status: RESOLVED | Noted: 2022-10-08 | Resolved: 2022-10-08

## 2022-10-08 PROBLEM — J96.01 ACUTE RESPIRATORY FAILURE WITH HYPOXIA (HCC): Status: ACTIVE | Noted: 2022-10-08

## 2022-10-08 PROBLEM — D64.9 ANEMIA: Status: ACTIVE | Noted: 2022-10-08

## 2022-10-08 LAB
BASOPHILS # BLD AUTO: 1 % (ref 0–1.8)
BASOPHILS # BLD: 0.06 K/UL (ref 0–0.12)
CHOLEST SERPL-MCNC: 287 MG/DL (ref 100–199)
EOSINOPHIL # BLD AUTO: 0.07 K/UL (ref 0–0.51)
EOSINOPHIL NFR BLD: 1.1 % (ref 0–6.9)
ERYTHROCYTE [DISTWIDTH] IN BLOOD BY AUTOMATED COUNT: 45.5 FL (ref 35.9–50)
EST. AVERAGE GLUCOSE BLD GHB EST-MCNC: 123 MG/DL
GLUCOSE BLD STRIP.AUTO-MCNC: 111 MG/DL (ref 65–99)
GLUCOSE BLD STRIP.AUTO-MCNC: 83 MG/DL (ref 65–99)
GLUCOSE BLD STRIP.AUTO-MCNC: 94 MG/DL (ref 65–99)
GLUCOSE BLD STRIP.AUTO-MCNC: 99 MG/DL (ref 65–99)
HBA1C MFR BLD: 5.9 % (ref 4–5.6)
HCT VFR BLD AUTO: 38.4 % (ref 37–47)
HDLC SERPL-MCNC: 57 MG/DL
HGB BLD-MCNC: 12.4 G/DL (ref 12–16)
IMM GRANULOCYTES # BLD AUTO: 0.04 K/UL (ref 0–0.11)
IMM GRANULOCYTES NFR BLD AUTO: 0.6 % (ref 0–0.9)
LDLC SERPL CALC-MCNC: 209 MG/DL
LYMPHOCYTES # BLD AUTO: 1.46 K/UL (ref 1–4.8)
LYMPHOCYTES NFR BLD: 23.6 % (ref 22–41)
MAGNESIUM SERPL-MCNC: 2 MG/DL (ref 1.5–2.5)
MCH RBC QN AUTO: 26.4 PG (ref 27–33)
MCHC RBC AUTO-ENTMCNC: 32.3 G/DL (ref 33.6–35)
MCV RBC AUTO: 81.7 FL (ref 81.4–97.8)
MONOCYTES # BLD AUTO: 0.43 K/UL (ref 0–0.85)
MONOCYTES NFR BLD AUTO: 6.9 % (ref 0–13.4)
NEUTROPHILS # BLD AUTO: 4.13 K/UL (ref 2–7.15)
NEUTROPHILS NFR BLD: 66.8 % (ref 44–72)
NRBC # BLD AUTO: 0 K/UL
NRBC BLD-RTO: 0 /100 WBC
PLATELET # BLD AUTO: 215 K/UL (ref 164–446)
PMV BLD AUTO: 10.1 FL (ref 9–12.9)
RBC # BLD AUTO: 4.7 M/UL (ref 4.2–5.4)
TRIGL SERPL-MCNC: 103 MG/DL (ref 0–149)
WBC # BLD AUTO: 6.2 K/UL (ref 4.8–10.8)

## 2022-10-08 PROCEDURE — 700105 HCHG RX REV CODE 258: Performed by: EMERGENCY MEDICINE

## 2022-10-08 PROCEDURE — 700101 HCHG RX REV CODE 250: Performed by: EMERGENCY MEDICINE

## 2022-10-08 PROCEDURE — 99233 SBSQ HOSP IP/OBS HIGH 50: CPT | Performed by: PSYCHIATRY & NEUROLOGY

## 2022-10-08 PROCEDURE — 99223 1ST HOSP IP/OBS HIGH 75: CPT | Mod: 25,FS | Performed by: NURSE PRACTITIONER

## 2022-10-08 PROCEDURE — 82962 GLUCOSE BLOOD TEST: CPT

## 2022-10-08 PROCEDURE — 99291 CRITICAL CARE FIRST HOUR: CPT | Performed by: STUDENT IN AN ORGANIZED HEALTH CARE EDUCATION/TRAINING PROGRAM

## 2022-10-08 PROCEDURE — 99222 1ST HOSP IP/OBS MODERATE 55: CPT | Performed by: HOSPITALIST

## 2022-10-08 PROCEDURE — 770001 HCHG ROOM/CARE - MED/SURG/GYN PRIV*

## 2022-10-08 PROCEDURE — 700111 HCHG RX REV CODE 636 W/ 250 OVERRIDE (IP): Performed by: NURSE PRACTITIONER

## 2022-10-08 PROCEDURE — 83735 ASSAY OF MAGNESIUM: CPT

## 2022-10-08 PROCEDURE — 85025 COMPLETE CBC W/AUTO DIFF WBC: CPT

## 2022-10-08 PROCEDURE — 700102 HCHG RX REV CODE 250 W/ 637 OVERRIDE(OP): Performed by: STUDENT IN AN ORGANIZED HEALTH CARE EDUCATION/TRAINING PROGRAM

## 2022-10-08 PROCEDURE — 700101 HCHG RX REV CODE 250: Performed by: NURSE PRACTITIONER

## 2022-10-08 PROCEDURE — A9270 NON-COVERED ITEM OR SERVICE: HCPCS | Performed by: STUDENT IN AN ORGANIZED HEALTH CARE EDUCATION/TRAINING PROGRAM

## 2022-10-08 RX ORDER — LEVOTHYROXINE SODIUM 175 UG/1
175 TABLET ORAL
Status: DISCONTINUED | OUTPATIENT
Start: 2022-10-09 | End: 2022-10-10 | Stop reason: HOSPADM

## 2022-10-08 RX ORDER — AMLODIPINE BESYLATE 5 MG/1
5 TABLET ORAL ONCE
Status: COMPLETED | OUTPATIENT
Start: 2022-10-08 | End: 2022-10-08

## 2022-10-08 RX ORDER — AMLODIPINE BESYLATE 5 MG/1
5 TABLET ORAL
Status: DISCONTINUED | OUTPATIENT
Start: 2022-10-08 | End: 2022-10-08

## 2022-10-08 RX ORDER — AMLODIPINE BESYLATE 5 MG/1
10 TABLET ORAL
Status: DISCONTINUED | OUTPATIENT
Start: 2022-10-09 | End: 2022-10-10 | Stop reason: HOSPADM

## 2022-10-08 RX ADMIN — AMLODIPINE BESYLATE 5 MG: 5 TABLET ORAL at 08:38

## 2022-10-08 RX ADMIN — SODIUM CHLORIDE 5 MG/HR: 9 INJECTION, SOLUTION INTRAVENOUS at 00:57

## 2022-10-08 RX ADMIN — AMLODIPINE BESYLATE 5 MG: 5 TABLET ORAL at 09:42

## 2022-10-08 RX ADMIN — LABETALOL HYDROCHLORIDE 10 MG: 5 INJECTION, SOLUTION INTRAVENOUS at 17:25

## 2022-10-08 RX ADMIN — HYDRALAZINE HYDROCHLORIDE 10 MG: 20 INJECTION INTRAMUSCULAR; INTRAVENOUS at 16:10

## 2022-10-08 ASSESSMENT — ENCOUNTER SYMPTOMS
DIAPHORESIS: 0
MYALGIAS: 0
TINGLING: 0
COUGH: 0
STRIDOR: 0
PND: 0
WEAKNESS: 0
FALLS: 0
CLAUDICATION: 0
POLYDIPSIA: 0
CARDIOVASCULAR NEGATIVE: 1
TREMORS: 0
ABDOMINAL PAIN: 0
NECK PAIN: 0
SHORTNESS OF BREATH: 1
FOCAL WEAKNESS: 0
ORTHOPNEA: 0
HEARTBURN: 0
HEMOPTYSIS: 0
DEPRESSION: 1
FLANK PAIN: 0
EYES NEGATIVE: 1
WHEEZING: 0
BLURRED VISION: 0
CHILLS: 0
HEADACHES: 0
DIZZINESS: 1
PHOTOPHOBIA: 0
SPUTUM PRODUCTION: 0
DOUBLE VISION: 0
DEPRESSION: 0
PALPITATIONS: 0
DIARRHEA: 0
RESPIRATORY NEGATIVE: 1
HEADACHES: 1
LOSS OF CONSCIOUSNESS: 0
NAUSEA: 0
CONSTIPATION: 0
HALLUCINATIONS: 0
SORE THROAT: 0
MUSCULOSKELETAL NEGATIVE: 1
TINGLING: 1
SINUS PAIN: 0
EYE PAIN: 0
NAUSEA: 1
VOMITING: 1
BRUISES/BLEEDS EASILY: 0
VOMITING: 0
BACK PAIN: 0
FEVER: 0
BLOOD IN STOOL: 0

## 2022-10-08 ASSESSMENT — PAIN DESCRIPTION - PAIN TYPE: TYPE: ACUTE PAIN

## 2022-10-08 ASSESSMENT — FIBROSIS 4 INDEX: FIB4 SCORE: 0.9

## 2022-10-08 ASSESSMENT — LIFESTYLE VARIABLES: SUBSTANCE_ABUSE: 0

## 2022-10-08 NOTE — PROGRESS NOTES
NEUROLOGY PROGRESS NOTE      BACKGROUND:    57 y.o. female was admitted on 10/7/2022  3:27 PM for Hemorrhagic cerebrovascular accident (CVA) (HCC) [I61.9].  She is being followed by Neurology for small right pontine hemorrhage.    SUBJECTIVE:   No new complaints.  No headache.  Neurologically intact.    VITALS:  Vitals:    10/08/22 0915 10/08/22 0930 10/08/22 1000 10/08/22 1030   BP: (!) 163/81 (!) 161/77 (!) 162/80 (!) 156/73   Pulse: (!) 58 (!) 59 63 (!) 58   Resp: 18 (!) 23 (!) 33    Temp:       TempSrc:       SpO2: 92% 95% 94% 96%   Weight:       Height:           NEUROLOGICAL EXAM:   Orientation to time, place, and person were normal.  Recent and remote memory were normal.  Attention span and concentration were normal.  Language (naming, repetition, spontaneous speech) was normal.  Fund of knowledge was normal for age and education.  All cranial nerves were normal: Pupils were equally round and reactive to light.  Motor: (tone, bulk and strength): 5/5 strength in both upper and lower extremity, proximal and distal - no abnormal movement noted.  Patient has normal muscle bulk and tone.  Sensation (all modalities) was normal  Reflexes were normal and symmetrical in both upper and lower extremities  Coordination (finger-to-nose, heel/knee/shin, rapid alternating movements) was normal. There was no ataxia, no tremors, and no dysmetria. Station and gait were normal    OBJECTIVE:    NEUROIMAGING:    MR-BRAIN-W/O   Final Result      1.  There is an approximately 1 cm sized subacute hemorrhage in the right paramedian nicola with associated developmental venous anomaly.   2.  Mild cerebral volume loss.   3.  Moderate chronic microvascular ischemic disease.          MEDICATIONS:  Current Facility-Administered Medications   Medication Dose Route Frequency Provider Last Rate Last Admin    [START ON 10/9/2022] amLODIPine (NORVASC) tablet 10 mg  10 mg Oral Q DAY Lisa Mathews M.D.        LORazepam (ATIVAN) injection 2 mg   2 mg Intravenous Q5 MIN PRN Silvia Peres        acetaminophen (Tylenol) tablet 650 mg  650 mg Oral Q4HRS PRN Silvia Peres        Or    acetaminophen (TYLENOL) suppository 650 mg  650 mg Rectal Q4HRS PRN Silvia Peres MD Alert...ICU Electrolyte Replacement per Pharmacy   Other PHARMACY TO DOSE Silvia Peres        labetalol (NORMODYNE/TRANDATE) injection 10 mg  10 mg Intravenous Q4HRS PRN Silvia Peres        hydrALAZINE (APRESOLINE) injection 10 mg  10 mg Intravenous Q4HRS PRN Silvia Peres        enalaprilat (Vasotec) injection 1.25 mg 1 mL  1.25 mg Intravenous Q6HRS PRN Silvia Peres        insulin regular (HumuLIN R,NovoLIN R) injection  1-6 Units Subcutaneous Q6HRS Silvia Peres        And    dextrose 50% (D50W) injection 25 g  25 g Intravenous Q15 MIN PRN Silvia Peres        senna-docusate (PERICOLACE or SENOKOT S) 8.6-50 MG per tablet 2 Tablet  2 Tablet Oral BID Silvia Peres        And    polyethylene glycol/lytes (MIRALAX) PACKET 1 Packet  1 Packet Oral QDAY PRN Silvia Peres        And    magnesium hydroxide (MILK OF MAGNESIA) suspension 30 mL  30 mL Oral QDAY PRN Silvia Peres        And    bisacodyl (DULCOLAX) suppository 10 mg  10 mg Rectal QDAY PRN Silvia Peres        Respiratory Therapy Consult   Nebulization Continuous RT Silvia Peres           LABS:      Recent Labs     10/07/22  1320 10/08/22  0415   WBC 9.2 6.2   RBC 5.64* 4.70   HEMOGLOBIN 14.6 12.4   HEMATOCRIT 44.9 38.4   MCV 79.6* 81.7   MCH 25.9* 26.4*   MCHC 32.5* 32.3*   RDW 42.7 45.5   PLATELETCT 314 215   MPV 10.0 10.1     Recent Labs     10/07/22  1320   SODIUM 141   POTASSIUM 4.1   CHLORIDE 103   CO2 24   GLUCOSE 163*   BUN 13     INR   Date Value Ref Range Status   10/07/2022 1.16 (H) 0.87 - 1.13 Final     Comment:     Reference range:  INR - Non-therapeutic Reference Range: 0.87-1.13  INR - Therapeutic Reference Range: 2.0-4.0       No results found for: POCINR  Lab Results   Component Value  Date/Time    CREATININE 0.86 10/07/2022 1320     Lab Results   Component Value Date/Time    IFAFRICA >60 05/20/2021 1329    IFNOTAFR >60 05/20/2021 1329         ASSESSMENT AND PLAN:  57 y.o. woman with history of hypertension who presents with acute onset lightheadedness, vertigo and hypertension in the setting of a CT head showing possible pontine hemorrhage.  Brain MRI confirmed there is an approximately 1 cm sized subacute hemorrhage in the right paramedian nicola with associated developmental venous anomaly.  Case was discussed with neuro interventionist and no intervention deemed necessary at this time.  Continue with blood pressure management and keep systolic less than 160.  Keep head of bed elevated at 30 degrees.  Neurologically stable and can be transferred out of ICU.  No focal neurological deficit and does not require any therapies.

## 2022-10-08 NOTE — ASSESSMENT & PLAN NOTE
-Check lipid panel  -Heart healthy diet when appropriate for PO intake  -Encourage lifestyle modifications

## 2022-10-08 NOTE — H&P
Critical Care History & Physical Note    Date of Service  10/8/2022    Primary Care Physician  KRISTEN Jim.    Consultants  neurology    Specialist Names: Dr. Rex Sinha    Code Status  Full Code    Chief Complaint  Chief Complaint   Patient presents with    Nausea    Headache    Dizziness       History of Presenting Illness  Brielle Krishnamurthy is a 57 y.o. female w/ PMHx s/f migraines, HTN (not taking medication), HLD (diet controlled), benign thyroid nodules s/p thyroidectomy, anemia, depression, and class II obesity who initially presented to [facility] ED on 10/7/2022 for evaluation of sudden onset headache, dizziness and lightheadedness accompanied by gait disturbance, ataxia, nausea and vomiting. The patient states she had a sensation of motion suggesting vertigo and thought she was going to pass out but did not. When she sat down she noticed her left upper and lower extremity were tingling and her lips were tingling as well.  She denied fever/chills, vision changes, chest pain/palpitations, shortness of breath, abdominal or urinary symptoms. A CT head at the outside facility showed a punctate pontine hemorrhage. On evaluation, she was noted to be hypertensive with SBP >180 which was treated with labetalol. She was then transferred to this facility for higher level of care. Of note, she does report daily caffeine and frequent ibuprofen for migraines use but denies blood thinners.    Upon arrival, a CT a/p head showed no infarction. Dr. Sinha with neurology was consulted and an MRI was done for definitively rule in or out a pontine hemorrhage. The MRI results showed an approximately 1 cm sized subacute hemorrhage in the right paramedian nicola with associated developmental venous anomaly. The patient was noted to be hypertensive on arrival to this facility for which she was placed on a Cardene infusion for strict blood pressure management with a goal SBP <160. She is admitted to the  ICU for further evaluation, close neurological monitoring and blood pressure management.       I discussed the plan of care with patient, bedside RN, and Dr. Walsh .    Review of Systems  Review of Systems   Constitutional:  Positive for malaise/fatigue (Chronic).   HENT: Negative.     Eyes: Negative.    Respiratory: Negative.     Cardiovascular: Negative.    Gastrointestinal:  Positive for nausea and vomiting.   Genitourinary: Negative.    Musculoskeletal: Negative.    Skin: Negative.    Neurological:  Positive for dizziness, tingling (LUE/LLE) and headaches.   Endo/Heme/Allergies: Negative.    Psychiatric/Behavioral:  Positive for depression.      Past Medical History   has a past medical history of Anemia, Clostridium difficile infection (2011), HTN (hypertension) (07-29-11), Hypothyroidism, postsurgical (2011), obesity class II     Surgical History  Submandible abscess incision and drainage (7/6/2011); dental extraction(s) (7/6/2011); thyroidectomy total (8/3/2011); and vaginal hysterectomy scope total (6/16/2014).     Family History  family history includes Cancer in sister; Heart Disease in mother & father; Hypertension in her mother & father; Stroke in an other family member.   Family history reviewed with patient. There is no family history that is pertinent to the chief complaint.     Social History  She has never smoked/used smokeless tobacco  She reports current alcohol use.   She reports that she does not use drugs  Independent with ADLs, works full time      Allergies  No Known Allergies    Medications  Prior to Admission Medications   Prescriptions Last Dose Informant Patient Reported? Taking?   Ascorbic Acid (VITAMIN C PO) 10/6/2022 at PM Patient Yes No   Sig: Take 1 Tablet by mouth every evening.   Cholecalciferol (VITAMIN D-3) 5000 UNITS TABS 10/6/2022 at PM Patient Yes No   Sig: Take 2 Tablets by mouth every evening.   Magnesium 400 MG CAPS 10/7/2022 at 0300 Patient Yes No   Sig: Take 400 mg by  mouth every morning.   Multiple Vitamins-Minerals (ZINC PO) 10/6/2022 at AM Patient Yes No   Sig: Take 1 Tablet by mouth every morning.   SELENIUM PO 10/7/2022 at 0300 Patient Yes No   Sig: Take 1 Tablet by mouth every morning.   buPROPion (WELLBUTRIN XL) 150 MG XL tablet 10/6/2022 at PM Patient Yes No   Sig: Take 300 mg by mouth every evening.   ibuprofen (MOTRIN) 200 MG Tab 10/6/2022 at PRN Patient Yes No   Sig: Take 400-600 mg by mouth every 6 hours as needed for Headache.   levothyroxine (SYNTHROID) 175 MCG Tab 10/7/2022 at 0300 Patient No No   Sig: Take 1 Tablet by mouth every morning on an empty stomach. BRAND NAME SYNTHROID      Facility-Administered Medications: None       Physical Exam  Temp:  [35.9 °C (96.7 °F)-36.8 °C (98.2 °F)] 36.8 °C (98.2 °F)  Pulse:  [56-72] 64  Resp:  [12-20] 12  BP: (134-181)/(69-99) 134/74  SpO2:  [90 %-98 %] 91 %  Blood Pressure: (!) 149/81   Temperature: 35.9 °C (96.7 °F)   Pulse: 66   Respiration: 20   Pulse Oximetry: 97 %       Physical Exam  Constitutional:       General: She is not in acute distress.     Appearance: Normal appearance. She is obese.   HENT:      Head: Normocephalic and atraumatic.      Nose: Nose normal.      Mouth/Throat:      Mouth: Mucous membranes are dry.   Eyes:      General: No scleral icterus.     Extraocular Movements: Extraocular movements intact.      Pupils: Pupils are equal, round, and reactive to light.   Cardiovascular:      Rate and Rhythm: Normal rate and regular rhythm.      Pulses: Normal pulses.      Heart sounds: Normal heart sounds. No murmur heard.    No friction rub.   Pulmonary:      Effort: Pulmonary effort is normal.      Breath sounds: Normal breath sounds. No wheezing or rhonchi.   Abdominal:      General: Bowel sounds are normal. There is no distension.      Palpations: Abdomen is soft.      Tenderness: There is no abdominal tenderness. There is no guarding.   Genitourinary:     Comments: deferred  Musculoskeletal:          General: Normal range of motion.      Cervical back: Normal range of motion and neck supple.      Right lower leg: No edema.      Left lower leg: No edema.   Skin:     General: Skin is warm and dry.      Capillary Refill: Capillary refill takes less than 2 seconds.   Neurological:      Mental Status: She is alert and oriented to person, place, and time.      Comments: Mental status: Awake, alert and fully oriented  Speech and language: Speech is clear and fluent. The patient is able to name and repeat, and follow commands  Cranial nerve exam: Pupils are equal, round and reactive to light bilaterally. Visual fields are full. There is no nystagmus. Extraocular muscles are intact. Face is symmetric. Sensation in the face is intact to light touch. Palate elevates symmetrically. Tongue is midline.  Motor exam: Power 5/5 throughout all extremities. There is sustained antigravity with no downward drift in bilateral arms and legs.  There is no pronator drift. Tone is normal. No abnormal movements were seen on exam.   Sensory exam: Reports equal/normal sensation to bilateral upper/lower extremities   Coordination: No ataxia on bilateral finger-to-nose testing, heel to shin normal bilaterally, no cerebellar ataxia noted  Gait: Deferred due to patient preference   Psychiatric:         Mood and Affect: Mood normal.         Behavior: Behavior normal.       Laboratory:  Recent Labs     10/07/22  1320   WBC 9.2   RBC 5.64*   HEMOGLOBIN 14.6   HEMATOCRIT 44.9   MCV 79.6*   MCH 25.9*   MCHC 32.5*   RDW 42.7   PLATELETCT 314   MPV 10.0     Recent Labs     10/07/22  1320   SODIUM 141   POTASSIUM 4.1   CHLORIDE 103   CO2 24   GLUCOSE 163*   BUN 13   CREATININE 0.86   CALCIUM 9.5     Recent Labs     10/07/22  1320   ALTSGPT 21   ASTSGOT 23   ALKPHOSPHAT 85   TBILIRUBIN 0.4   GLUCOSE 163*     Recent Labs     10/07/22  1320   APTT 31.4   INR 1.16*     No results for input(s): NTPROBNP in the last 72 hours.      Recent Labs      10/07/22  1320   TROPONINT 10       Imaging:  MR-BRAIN-W/O   Final Result      1.  There is an approximately 1 cm sized subacute hemorrhage in the right paramedian nicola with associated developmental venous anomaly.   2.  Mild cerebral volume loss.   3.  Moderate chronic microvascular ischemic disease.          X-Ray:  I have personally reviewed the images and compared with prior images.  EKG:  I have personally reviewed the images and compared with prior images.    Assessment/Plan:  Justification for Admission Status  I anticipate this patient will require at least two midnights for appropriate medical management, necessitating inpatient admission because pontine hemorrhage    Patient will need a ICU (Adult and Pediatrics) bed on MEDICAL service .  The need is secondary to pontine hemorrhage & htn.    Pontine hemorrhage (HCC)  Assessment & Plan  MRI results showed an approximately 1 cm sized subacute hemorrhage in the right paramedian nicola with associated developmental venous anomaly. Patient is not on blood thinners.  -Neurology consulted  -Consult neuro-interventionalist in the am  -Keep SBP <160  -Q1h neuro exams  -Strict bedrest   --Elevate HOB 30 degrees  -Seizure & Aspiration precautions  -Cardene gtt PRN to keep SBP <160 and PRN labetalol/apresoline   -Norepinephrine gtt PRN to keep SBP >90  -Coag panel normal  -Control pain but avoid oversedating patient with narcotics  -Keep NPO until cleared by neuro intervention   -Check lipid panel and HgA1c    Essential hypertension- (present on admission)  Assessment & Plan  -Cardene to keep SBP <160  - PRN labetalol/apresoline to keep SBP <160  - Start PO antihypertensives when appropriate  - Monitor I&O; external catheter  - Cardiac diet when appropriate  - Control pain  - UDS   - Avoid caffeine    Morbid obesity with BMI of 40.0-44.9, adult (HCC)- (present on admission)  Assessment & Plan  - BMI 43.6  - Encourage dietary/lifestyle changes  - PT/OT  -  Cardiac/Diabetic, 1800 Calorie when cleared for PO    Pure hypercholesterolemia- (present on admission)  Assessment & Plan  -Check lipid panel  -Heart healthy diet when appropriate for PO intake  -Encourage lifestyle modifications      Postoperative hypothyroidism- (present on admission)  Assessment & Plan  Patient has h/o benign thyroid nodules s/p remote thyroidectomy   - Resume synthroid when taking PO  - Thyroid panel         VTE prophylaxis: SCDs/TEDs and pharmacologic prophylaxis contraindicated due to R Pontine hemorrhage.    Patient was critically ill during the time I treated the patient.    50 minutes of critical care time were spent, including examination and interview of the patient, explanation of prognosis/diagnosis/plan of care, direction of nursing staff and discussion of the patient with other physicians involved.  This time was independent of procedures performed.    Greater than 50% of which was spent at the bedside.

## 2022-10-08 NOTE — ASSESSMENT & PLAN NOTE
"Likely secondary to hypertensive emergency   keep blood pressure less than 160  Patient states that her symptoms have mostly resolved now.  Every 4 hours neurochecks  PT OT eval\"    Home but she did mention she was slightly \"unsteady\"  Treat BP  Ordered Ddimer and echo pending.  "

## 2022-10-08 NOTE — ASSESSMENT & PLAN NOTE
MRI results showed an approximately 1 cm sized subacute hemorrhage in the right paramedian nicola with associated developmental venous anomaly. Patient is not on blood thinners.  -Appreciate neuro recs  -Case was discussed with neuro IR-no intervention recommended  -Keep SBP <160 -on p.o. antihypertensives at this time and has as needed IV meds  -Q2h neuro exams  -Elevate HOB 30 degrees  -Seizure & Aspiration precautions

## 2022-10-08 NOTE — PROGRESS NOTES
4 Eyes Skin Assessment Completed by AYLIN Santos and AYLIN Cardenas.    Head WDL  Ears WDL  Nose WDL  Mouth WDL  Neck WDL  Breast/Chest WDL  Shoulder Blades Redness and Blanching  Spine Redness and Blanching  (R) Arm/Elbow/Hand WDL  (L) Arm/Elbow/Hand Redness, Blanching, and Abrasion  Abdomen WDL  Groin WDL  Scrotum/Coccyx/Buttocks Redness and Blanching  (R) Leg WDL  (L) Leg WDL  (R) Heel/Foot/Toe WDL  (L) Heel/Foot/Toe WDL          Devices In Places ECG, Blood Pressure Cuff, Pulse Ox, SCD's, and Nasal Cannula      Interventions In Place Gray Ear Foams, Pillows, Q2 Turns, Low Air Loss Mattress, Heels Loaded W/Pillows, and Pressure Redistribution Mattress    Possible Skin Injury No    Pictures Uploaded Into Epic N/A  Wound Consult Placed N/A  RN Wound Prevention Protocol Ordered Yes

## 2022-10-08 NOTE — PROGRESS NOTES
Critical Care Progress Note    Date of admission  10/7/2022    Chief Complaint  57 y.o. female hx of HTN, hypothyroid post thyroidectomy, depression p/w HA, dizziness, nausea, LUE numbness/tingling on 10/7/22 - CT head w/punctate pontine hemorrhage, MRI w/small 1cm subacute hemorrhage in paramedian nicola R of midline w/associated venous anomaly. Transferred from [facility] ER to [facility] for ICU for neuro checks and BP monitoring. Case discussed with neuro IR - no intervention recommended.    Interval Problem Update  Reviewed last 24 hour events:  Cardiac: 120-130s (goal <160) now off nicardipine  Pulm: 4L O2  Heme: stable  /renal: stable  GI/endo: BG <180  ID:  na   I/O: +900cc  Additional Labs/Imaging: na  Additional information:   - amlodipine 10    Review of Systems  Review of Systems   Neurological:  Positive for dizziness. Negative for focal weakness, loss of consciousness, weakness and headaches.      Vital Signs for last 24 hours   Temp:  [35.9 °C (96.7 °F)-36.8 °C (98.2 °F)] 36.4 °C (97.5 °F)  Pulse:  [49-72] 58  Resp:  [11-33] 33  BP: (114-181)/(64-99) 156/73  SpO2:  [90 %-99 %] 96 %    Hemodynamic parameters for last 24 hours       Respiratory Information for the last 24 hours       Physical Exam   Physical Exam  Vitals and nursing note reviewed.   Constitutional:       General: She is not in acute distress.     Appearance: She is not toxic-appearing.   HENT:      Head: Normocephalic.      Nose: No congestion.      Mouth/Throat:      Mouth: Mucous membranes are dry.   Eyes:      General: No scleral icterus.     Conjunctiva/sclera: Conjunctivae normal.   Cardiovascular:      Rate and Rhythm: Normal rate and regular rhythm.   Pulmonary:      Effort: Pulmonary effort is normal. No respiratory distress.      Breath sounds: No wheezing.   Abdominal:      Palpations: Abdomen is soft.   Musculoskeletal:         General: No deformity.   Skin:     General: Skin is warm and dry.   Neurological:      Mental Status: She is  alert.      Cranial Nerves: No cranial nerve deficit.      Motor: No weakness.      Comments: 5/5 strength in all extremities.        Medications  Current Facility-Administered Medications   Medication Dose Route Frequency Provider Last Rate Last Admin    [START ON 10/9/2022] amLODIPine (NORVASC) tablet 10 mg  10 mg Oral Q DAY Lisa Mathews M.D.        [START ON 10/9/2022] levothyroxine (SYNTHROID) tablet 175 mcg  175 mcg Oral AM ES Lisa Mathews M.D.        LORazepam (ATIVAN) injection 2 mg  2 mg Intravenous Q5 MIN PRN Silvia Peres        acetaminophen (Tylenol) tablet 650 mg  650 mg Oral Q4HRS PRN Silvia Peres        Or    acetaminophen (TYLENOL) suppository 650 mg  650 mg Rectal Q4HRS PRN Silvia Peres MD Alert...ICU Electrolyte Replacement per Pharmacy   Other PHARMACY TO DOSE Silvia Peres        labetalol (NORMODYNE/TRANDATE) injection 10 mg  10 mg Intravenous Q4HRS PRN Silvia Peres        hydrALAZINE (APRESOLINE) injection 10 mg  10 mg Intravenous Q4HRS PRN Silvia Peres        enalaprilat (Vasotec) injection 1.25 mg 1 mL  1.25 mg Intravenous Q6HRS PRN Silvia Peres        insulin regular (HumuLIN R,NovoLIN R) injection  1-6 Units Subcutaneous Q6HRS Silvia Peres        And    dextrose 50% (D50W) injection 25 g  25 g Intravenous Q15 MIN PRN Silvia Peres        senna-docusate (PERICOLACE or SENOKOT S) 8.6-50 MG per tablet 2 Tablet  2 Tablet Oral BID Silvia DUQUE. Latona        And    polyethylene glycol/lytes (MIRALAX) PACKET 1 Packet  1 Packet Oral QDAY PRN Silvia LRadha Latona        And    magnesium hydroxide (MILK OF MAGNESIA) suspension 30 mL  30 mL Oral QDAY PRN Silvia LRadha Latona        And    bisacodyl (DULCOLAX) suppository 10 mg  10 mg Rectal QDAY PRN Silvia Peres        Respiratory Therapy Consult   Nebulization Continuous RT Silvia Peres           Fluids    Intake/Output Summary (Last 24 hours) at 10/8/2022 1255  Last data filed at 10/8/2022 1000  Gross per 24 hour    Intake 1177.34 ml   Output 0 ml   Net 1177.34 ml       Laboratory          Recent Labs     10/07/22  1320 10/08/22  0415   SODIUM 141  --    POTASSIUM 4.1  --    CHLORIDE 103  --    CO2 24  --    BUN 13  --    CREATININE 0.86  --    MAGNESIUM  --  2.0   CALCIUM 9.5  --      Recent Labs     10/07/22  1320   ALTSGPT 21   ASTSGOT 23   ALKPHOSPHAT 85   TBILIRUBIN 0.4   GLUCOSE 163*     Recent Labs     10/07/22  1320 10/08/22  0415   WBC 9.2 6.2   NEUTSPOLYS 79.00* 66.80   LYMPHOCYTES 14.90* 23.60   MONOCYTES 3.70 6.90   EOSINOPHILS 0.90 1.10   BASOPHILS 1.10 1.00   ASTSGOT 23  --    ALTSGPT 21  --    ALKPHOSPHAT 85  --    TBILIRUBIN 0.4  --      Recent Labs     10/07/22  1320 10/08/22  0415   RBC 5.64* 4.70   HEMOGLOBIN 14.6 12.4   HEMATOCRIT 44.9 38.4   PLATELETCT 314 215   PROTHROMBTM 14.3  --    APTT 31.4  --    INR 1.16*  --        Imaging  Reviewed      Assessment/Plan  Pontine hemorrhage (HCC)  Assessment & Plan  MRI results showed an approximately 1 cm sized subacute hemorrhage in the right paramedian nicola with associated developmental venous anomaly. Patient is not on blood thinners.  -Appreciate neuro recs  -Case was discussed with neuro IR-no intervention recommended  -Keep SBP <160 -on p.o. antihypertensives at this time and has as needed IV meds  -Q2h neuro exams  -Elevate HOB 30 degrees  -Seizure & Aspiration precautions      Essential hypertension- (present on admission)  Assessment & Plan  -Goal SBP <160-titrate Cardene as tolerated  - PRN labetalol/apresoline to keep SBP <160  - Started p.o. antihypertensives-titrate as tolerated  - Monitor I&O; external catheter  - Cardiac diet   - Control pain  - Avoid caffeine    Morbid obesity with BMI of 40.0-44.9, adult (HCC)- (present on admission)  Assessment & Plan  - BMI 43.6  - Encourage dietary/lifestyle changes  - PT/OT  - Cardiac diet    Pure hypercholesterolemia- (present on admission)  Assessment & Plan  -Check lipid panel  -Heart healthy diet when  appropriate for PO intake  -Encourage lifestyle modifications      Postoperative hypothyroidism- (present on admission)  Assessment & Plan  Patient has h/o benign thyroid nodules s/p remote thyroidectomy   - Resume synthroid         VTE:  Contraindicated  Ulcer: Not Indicated  Lines: None    I have performed a physical exam and reviewed and updated ROS and Plan today (10/8/2022). In review of yesterday's note (10/7/2022), there are no changes except as documented above.     Discussed patient condition and risk of morbidity and/or mortality with RN, RT, and Pharmacy  The patient remains critically ill.  Critical care time = 35 minutes in directly providing and coordinating critical care and extensive data review.  No time overlap and excludes procedures.    This note was generated using voice recognition software which has a chance of producing errors of grammar and content.  I have made every reasonable attempt to find and correct any errors, but it should be expected that some may not be found prior to finalization of this note.  __________  Lisa Mathews MD  Pulmonary and Critical Care Medicine  Formerly [facility]

## 2022-10-08 NOTE — CONSULTS
Hospital Medicine Consultation    Date of Service  10/8/2022    Referring Physician  Albina Vazquez M.D.    Consulting Physician  Albina Vazquez M.D.    Reason for Consultation  Post ICU care    History of Presenting Illness  57 y.o. female who presented 10/7/2022 with past medical history of hypertension, hypothyroidism who comes into the hospital for headaches, difficulty walking, dizziness, vertigo while at work.  Associated with nausea and vomiting.  Patient with presented with a blood sugar 222/130.  CT CT scan found punctate hemorrhages in the nicola.  MRI confirmed a small 1 cm subacute hemorrhage in the paramedian nicola associated with a venous abnormality.  Patient was monitored in ICU for 24 hours.  She was placed on a Cardene drip and was transitioned to Norvasc.  Started on 4 L of oxygen.  Currently patient states that her headaches and symptoms have resolved.  She still has trouble walking.    Chest x-ray from yesterday found to increase intravascular congestion and cardiomegaly    Review of Systems  Review of Systems   Constitutional:  Negative for chills, diaphoresis, fever and malaise/fatigue.   HENT:  Negative for congestion, ear discharge, ear pain, hearing loss, nosebleeds, sinus pain, sore throat and tinnitus.    Eyes:  Negative for blurred vision, double vision, photophobia and pain.   Respiratory:  Positive for shortness of breath. Negative for cough, hemoptysis, sputum production, wheezing and stridor.    Cardiovascular:  Positive for leg swelling. Negative for chest pain, palpitations, orthopnea, claudication and PND.   Gastrointestinal:  Negative for abdominal pain, blood in stool, constipation, diarrhea, heartburn, melena, nausea and vomiting.   Genitourinary:  Negative for dysuria, flank pain, frequency, hematuria and urgency.   Musculoskeletal:  Negative for back pain, falls, joint pain, myalgias and neck pain.   Skin:  Negative for itching and rash.   Neurological:  Positive for dizziness and  headaches. Negative for tingling, tremors and weakness.   Endo/Heme/Allergies:  Negative for environmental allergies and polydipsia. Does not bruise/bleed easily.   Psychiatric/Behavioral:  Negative for depression, hallucinations, substance abuse and suicidal ideas.      Past Medical History   has a past medical history of Anemia, Clostridium difficile infection (2011), HTN (hypertension) (07-29-11), Hypothyroidism, postsurgical (2011), Other specified disorder of intestines, and Other specified symptom associated with female genital organs.    She has no past medical history of Anesthesia or Breast cancer (HCC).    Surgical History   has a past surgical history that includes submandible abscess incision and drainage (7/6/2011); dental extraction(s) (7/6/2011); thyroidectomy total (8/3/2011); and vaginal hysterectomy scope total (6/16/2014).    Family History  family history includes Cancer in an other family member; Heart Disease in an other family member; Hypertension in her mother; Stroke in an other family member.    Social History   reports that she has never smoked. She has never used smokeless tobacco. She reports current alcohol use. She reports that she does not use drugs.    Medications  Prior to Admission Medications   Prescriptions Last Dose Informant Patient Reported? Taking?   Ascorbic Acid (VITAMIN C PO) 10/6/2022 at PM Patient Yes No   Sig: Take 1 Tablet by mouth every evening.   Cholecalciferol (VITAMIN D-3) 5000 UNITS TABS 10/6/2022 at PM Patient Yes No   Sig: Take 2 Tablets by mouth every evening.   Magnesium 400 MG CAPS 10/7/2022 at 0300 Patient Yes No   Sig: Take 400 mg by mouth every morning.   Multiple Vitamins-Minerals (ZINC PO) 10/6/2022 at AM Patient Yes No   Sig: Take 1 Tablet by mouth every morning.   SELENIUM PO 10/7/2022 at 0300 Patient Yes No   Sig: Take 1 Tablet by mouth every morning.   buPROPion (WELLBUTRIN XL) 150 MG XL tablet 10/6/2022 at PM Patient Yes No   Sig: Take 300 mg by  mouth every evening.   ibuprofen (MOTRIN) 200 MG Tab 10/6/2022 at PRN Patient Yes No   Sig: Take 400-600 mg by mouth every 6 hours as needed for Headache.   levothyroxine (SYNTHROID) 175 MCG Tab 10/7/2022 at 0300 Patient No No   Sig: Take 1 Tablet by mouth every morning on an empty stomach. BRAND NAME SYNTHROID      Facility-Administered Medications: None       Allergies  No Known Allergies    Physical Exam  Temp:  [35.9 °C (96.7 °F)-36.8 °C (98.2 °F)] 36.4 °C (97.5 °F)  Pulse:  [49-72] 66  Resp:  [11-33] 20  BP: (114-206)/() 130/73  SpO2:  [90 %-99 %] 94 %    Physical Exam  Vitals and nursing note reviewed.   Constitutional:       General: She is not in acute distress.     Appearance: Normal appearance. She is not ill-appearing, toxic-appearing or diaphoretic.   HENT:      Head: Normocephalic and atraumatic.      Nose: No congestion or rhinorrhea.      Mouth/Throat:      Pharynx: No oropharyngeal exudate or posterior oropharyngeal erythema.   Eyes:      General: No scleral icterus.  Neck:      Vascular: No carotid bruit or JVD.   Cardiovascular:      Rate and Rhythm: Normal rate and regular rhythm.      Pulses: Normal pulses.      Heart sounds: Normal heart sounds. No murmur heard.    No friction rub. No gallop.   Pulmonary:      Effort: Pulmonary effort is normal. No respiratory distress.      Breath sounds: No stridor. No wheezing, rhonchi or rales.   Abdominal:      General: Abdomen is flat. There is no distension.      Palpations: There is no mass.      Tenderness: There is no abdominal tenderness. There is no left CVA tenderness, guarding or rebound.      Hernia: No hernia is present.   Musculoskeletal:         General: No swelling. Normal range of motion.      Cervical back: No rigidity. No muscular tenderness.      Right lower leg: No edema.      Left lower leg: No edema.   Lymphadenopathy:      Cervical: No cervical adenopathy.   Skin:     General: Skin is warm and dry.      Capillary Refill:  Capillary refill takes more than 3 seconds.      Coloration: Skin is not jaundiced or pale.      Findings: No bruising or erythema.   Neurological:      Mental Status: She is alert.       Fluids  Date 10/08/22 0700 - 10/09/22 0659   Shift 9793-5698 1605-1752 4339-1984 24 Hour Total   INTAKE   P.O. 24   24   I.V. 240   240   Shift Total 264   264   OUTPUT   Shift Total       Weight (kg) 134 134 134 134       Laboratory  Recent Labs     10/07/22  1320 10/08/22  0415   WBC 9.2 6.2   RBC 5.64* 4.70   HEMOGLOBIN 14.6 12.4   HEMATOCRIT 44.9 38.4   MCV 79.6* 81.7   MCH 25.9* 26.4*   MCHC 32.5* 32.3*   RDW 42.7 45.5   PLATELETCT 314 215   MPV 10.0 10.1     Recent Labs     10/07/22  1320   SODIUM 141   POTASSIUM 4.1   CHLORIDE 103   CO2 24   GLUCOSE 163*   BUN 13   CREATININE 0.86   CALCIUM 9.5     Recent Labs     10/07/22  1320   APTT 31.4   INR 1.16*          Recent Labs     10/07/22  1628   TRIGLYCERIDE 103   HDL 57   *        Imaging  MR-BRAIN-W/O   Final Result      1.  There is an approximately 1 cm sized subacute hemorrhage in the right paramedian nicola with associated developmental venous anomaly.   2.  Mild cerebral volume loss.   3.  Moderate chronic microvascular ischemic disease.      EC-ECHOCARDIOGRAM COMPLETE W/O CONT    (Results Pending)       Assessment/Plan  * Pontine hemorrhage (HCC)  Assessment & Plan  Likely secondary to hypertensive emergency   keep blood pressure less than 160  Patient states that her symptoms have mostly resolved now.  Every 4 hours neurochecks  PT OT eval    Acute respiratory failure with hypoxia (HCC)  Assessment & Plan  Likely secondary to pulmonary edema  Check cardiac echo  On 4 L of O2    Essential hypertension- (present on admission)  Assessment & Plan  Presented with hypertensive emergency  Uncontrolled  Continue Norvasc 10 mg  IV as needed medications have been ordered  Low-salt diet  Obtain a cardiac echo  Explained to the patient since she is hypoxic at nighttime while  sleeping she may have sleep apnea.  This can cause uncontrolled blood pressure.  She will need a sleep study as an outpatient    Postoperative hypothyroidism- (present on admission)  Assessment & Plan  Continue home thyroid medication

## 2022-10-08 NOTE — DISCHARGE PLANNING
[facility] Transitional [facility] Coordination    Referral from:  Silvia Peres  Insurance Provider on Facesheet: Ameena   Potential Rehab diagnosis: Stroke    Chart review indicates patient has ongoing medical management and may have therapy needs to possibly meet inpatient rehab facility criteria with the goal of returning to community.      D/C Support: To be determined    Physiatry consult pended while waiting for additional information.   MR brain - approximately 1 cm sized subacute hemorrhage in the right paramedian nicola.  Nicardipine gtt.   Would welcome PT/OT as clinically appropriate.  TCC will follow.  Please reach out sooner if PMR consult requested for medical management.     Last Covid test:    Thank you for the referral.

## 2022-10-08 NOTE — ED PROVIDER NOTES
ED Provider Note    This is an addendum to the note on this patient.  For further details and full chart information, see the previously signed note.      MR-BRAIN-W/O   Final Result      1.  There is an approximately 1 cm sized subacute hemorrhage in the right paramedian nicola with associated developmental venous anomaly.   2.  Mild cerebral volume loss.   3.  Moderate chronic microvascular ischemic disease.        Results for orders placed or performed during the hospital encounter of 10/07/22   TSH   Result Value Ref Range    TSH 8.590 (H) 0.380 - 5.330 uIU/mL   FREE THYROXINE   Result Value Ref Range    Free T-4 1.30 0.93 - 1.70 ng/dL   Lipid Profile - Fasting   Result Value Ref Range    Cholesterol,Tot 287 (H) 100 - 199 mg/dL    Triglycerides 103 0 - 149 mg/dL    HDL 57 >=40 mg/dL     (H) <100 mg/dL   Hemoglobin A1C prior to intervention or upon arrival to the unit   Result Value Ref Range    Glycohemoglobin 5.9 (H) 4.0 - 5.6 %    Est Avg Glucose 123 mg/dL   POCT glucose device results   Result Value Ref Range    POC Glucose, Blood 111 (H) 65 - 99 mg/dL       2145: Radiology resulted MRI which does unfortunately show punctate hemorrhage.  With this finding I have now discussed the case with on-call intensivist which will accept patient to ICU for ongoing inpatient management.    Impression:    Pontine hemorrhage  Hypertension

## 2022-10-08 NOTE — ASSESSMENT & PLAN NOTE
"Presented with hypertensive emergency  Uncontrolled  Continue Norvasc 10 mg  IV as needed medications have been ordered  Low-salt diet  Obtain a cardiac echo  Explained to the patient since she is hypoxic at nighttime while sleeping she may have sleep apnea.  This can cause uncontrolled blood pressure.  She will need a sleep study as an outpatient\"    Vitals:    10/09/22 1100   BP: (!) 145/74   Pulse:    Resp:    Temp:    SpO2: 93%     Started lisinopril as well  "

## 2022-10-08 NOTE — ASSESSMENT & PLAN NOTE
-Goal SBP <160-titrate Cardene as tolerated  - PRN labetalol/apresoline to keep SBP <160  - Started p.o. antihypertensives-titrate as tolerated  - Monitor I&O; external catheter  - Cardiac diet   - Control pain  - Avoid caffeine

## 2022-10-08 NOTE — CARE PLAN
The patient is Stable - Low risk of patient condition declining or worsening    Shift Goals  Clinical Goals: Q1 Neuro, hemodynamic stability  Patient Goals: Pain management, blood pressure management, paperwork for work leave of absence  Family Goals: YOU    Problem: Pain - Standard  Goal: Alleviation of pain or a reduction in pain to the patient’s comfort goal  Outcome: Progressing

## 2022-10-08 NOTE — ASSESSMENT & PLAN NOTE
"Likely secondary to pulmonary edema  Check cardiac echo  On 4 L of O2\"    Ddimer negative  Nocturnal hypoxia  "

## 2022-10-08 NOTE — PROGRESS NOTES
Patient's pupil unequal and reactive to light; per patient this pupillary difference is baseline and has been consistent since childhood. Right pupil 4, left pupil 2. Both equally reactive. Patient neuro assessment unchanged.

## 2022-10-08 NOTE — ED PROVIDER NOTES
"[facility] Provider Note    CHIEF COMPLAINT  Chief Complaint   Patient presents with    Nausea    Headache    Dizziness       HPI  Brielle Krishnamurthy is a 57 y.o. female who presents to the emergency department.  From [facility] emergency department due to possible brain hemorrhage.  The patient tells me that she was at work this morning when she suddenly felt extremely dizzy and lightheaded and there was a sensation of motion suggesting vertigo.  The patient thought that she might pass out but did not, she noted that she was having a hard time walking because of the severity of the dizziness.  She did have some nausea and vomiting.  The patient went to the [facility] emergency department after about 45 minutes of symptoms and she was evaluated and had a CT scan of the brain which suggested punctate hemorrhage in the nicola.  The patient was also noted to be extremely hypertensive and was treated with intravenous labetalol.  The case was reviewed with neurology and the patient transferred to this facility for further evaluation.  At the time of my arrival patient's room the patient had just been seen by Dr. Sinha from neurology and started on a nicardipine drip for hypertension.  The patient tells me that she did not initially have a headache with this but subsequently during the day she has developed a frontal headache.  She tells me that recently she has been having frequent \"migraine headaches\"    REVIEW OF SYSTEMS no trauma no fever or chills no motor dysfunction of the arms or legs or face no difficulty speaking.  All other systems negative    PAST MEDICAL HISTORY  Past Medical History:   Diagnosis Date    Anemia     Clostridium difficile infection 2011    HTN (hypertension) 07-29-11    has been on Lisinopril, off at this time    Hypothyroidism, postsurgical 2011    benign thyroid nodules; thyroidectomy    Other specified disorder of intestines     constipation    Other specified symptom associated with " "female genital organs        FAMILY HISTORY  Family History   Problem Relation Age of Onset    Hypertension Mother     Cancer Other     Heart Disease Other     Stroke Other        SOCIAL HISTORY  Social History     Socioeconomic History    Marital status:    Tobacco Use    Smoking status: Never    Smokeless tobacco: Never    Tobacco comments:     occasional social smoker   Substance and Sexual Activity    Alcohol use: Yes     Comment: occasionally    Drug use: No    Sexual activity: Never       SURGICAL HISTORY  Past Surgical History:   Procedure Laterality Date    VAGINAL HYSTERECTOMY SCOPE TOTAL  6/16/2014    Performed by Kelechi Ojeda M.D. at SURGERY [facility] ORS    THYROIDECTOMY TOTAL  8/3/2011    [facility]    SUBMANDIBLE ABSCESS INCISION AND DRAINAGE  7/6/2011    Performed by REBEKA HAYS at SURGERY [facility] ORS    DENTAL EXTRACTION(S)  7/6/2011    Performed by REBEKA HAYS at SURGERY [facility] ORS       CURRENT MEDICATIONS  Home Medications       Reviewed by Vladislav Mott (Pharmacy Tech) on 10/07/22 at 1625  Med List Status: Complete     Medication Last Dose Status   Ascorbic Acid (VITAMIN C PO)  Active   buPROPion (WELLBUTRIN XL) 150 MG XL tablet  Active   Cholecalciferol (VITAMIN D-3) 5000 UNITS TABS  Active   ibuprofen (MOTRIN) 200 MG Tab  Active   levothyroxine (SYNTHROID) 175 MCG Tab  Active   Magnesium 400 MG CAPS  Active   Multiple Vitamins-Minerals (ZINC PO)  Active   SELENIUM PO  Active                    ALLERGIES  No Known Allergies    PHYSICAL EXAM  VITAL SIGNS: BP (!) 158/91   Pulse 68   Temp 35.9 °C (96.7 °F) (Temporal)   Resp 15   Ht 1.753 m (5' 9\")   Wt 122 kg (270 lb)   LMP 06/10/2014   SpO2 96%   BMI 39.87 kg/m²    Oxygen saturation is interpreted as adequate  Constitutional: Awake lucid verbal very pleasant individual she does not appear toxic or distressed  HENT: No sign of trauma to the head  Eyes: " Pupils round extraocular motion is present  Neck: Trachea midline no JVD  Cardiovascular: Regular rate and rhythm  Lungs: Clear bilaterally with no apparent difficulty breathing  Abdomen/Back: Moderately obese soft nontender no rebound guarding or peritoneal findings  Skin: Warm and dry  Musculoskeletal: No acute bony deformity  Neurologic: The patient is awake lucid and verbal she speaks with full complete clear lucid sentences with no difficulty.  She is able to name objects without difficulty she is fully oriented.  The face moves normally.  Patient has good strength and coordination in the left upper and lower extremities no drift or cerebellar findings    CHART REVIEW  I have reviewed the chart materials from [facility] including the CT reports and laboratory test results and blood pressure record and I reviewed these in this record.    Laboratory  Prior to arrival CBC showed white blood cell count of 9.2 hemoglobin is adequate at 14.6 basic metabolic panel shows a slightly elevated blood sugar of 163 INR is 1.16 troponin is 10    Because of the patient's history of thyroid disorder I added additional labs and the TSH is elevated at 8.59 indicating a hypothyroid state.  The free T4 is within the normal range at 1.30    EKG interpretation  A twelve-lead EKG shows sinus rhythm 71 bpm T wave inversion is seen and lead III and RR prime in V2 suggesting a right bundle branch block pattern    Radiology  MR-BRAIN-W/O    (Results Pending)     I reviewed the CT reports obtained prior to arrival and as noted above this suggest punctate hemorrhage in the dorsal nicola right of the midline.  CT angiogram of the head and neck were unremarkable according to the radiology reports.      MEDICAL DECISION MAKING and DISPOSITION  In the emergency department as noted above the patient was started on a nicardipine drip.  Her blood pressure has been maintained at less than 160 systolic and she otherwise remains hemodynamically  stable.  I have reviewed the case with Dr. Sinha from neurology and also with the ICU attending and at this time the plan of care is that if hemorrhage is confirmed on MRI the patient will continue on the nicardipine drip and go to the intensive care unit.  If this turns out to be calcification or a benign process the patient will not require the nicardipine drip or ICU admit but she will need to be admitted for hypertensive urgency and vertigo.  The patient is in an MRI now, her care will be signed out to my partner pending radiology report and findings on the MRI.  I have also reviewed the plan of care with the patient.    IMPRESSION  1.  Possible hemorrhage within the incola  2.  Hypertensive urgency  3.  Vertigo  4.  Critical care time with this patient exceeds 35 minutes         Electronically signed by: Dominick Ahuja M.D., 10/7/2022 8:08 PM

## 2022-10-09 LAB
BASOPHILS # BLD AUTO: 1.3 % (ref 0–1.8)
BASOPHILS # BLD: 0.07 K/UL (ref 0–0.12)
D DIMER PPP IA.FEU-MCNC: 0.37 UG/ML (FEU) (ref 0–0.5)
EOSINOPHIL # BLD AUTO: 0.2 K/UL (ref 0–0.51)
EOSINOPHIL NFR BLD: 3.8 % (ref 0–6.9)
ERYTHROCYTE [DISTWIDTH] IN BLOOD BY AUTOMATED COUNT: 46.5 FL (ref 35.9–50)
GLUCOSE BLD STRIP.AUTO-MCNC: 101 MG/DL (ref 65–99)
GLUCOSE BLD STRIP.AUTO-MCNC: 103 MG/DL (ref 65–99)
GLUCOSE BLD STRIP.AUTO-MCNC: 80 MG/DL (ref 65–99)
GLUCOSE BLD STRIP.AUTO-MCNC: 86 MG/DL (ref 65–99)
HCT VFR BLD AUTO: 41.7 % (ref 37–47)
HGB BLD-MCNC: 13 G/DL (ref 12–16)
IMM GRANULOCYTES # BLD AUTO: 0.03 K/UL (ref 0–0.11)
IMM GRANULOCYTES NFR BLD AUTO: 0.6 % (ref 0–0.9)
LYMPHOCYTES # BLD AUTO: 1.41 K/UL (ref 1–4.8)
LYMPHOCYTES NFR BLD: 26.7 % (ref 22–41)
MAGNESIUM SERPL-MCNC: 2 MG/DL (ref 1.5–2.5)
MCH RBC QN AUTO: 25.8 PG (ref 27–33)
MCHC RBC AUTO-ENTMCNC: 31.2 G/DL (ref 33.6–35)
MCV RBC AUTO: 82.7 FL (ref 81.4–97.8)
MONOCYTES # BLD AUTO: 0.36 K/UL (ref 0–0.85)
MONOCYTES NFR BLD AUTO: 6.8 % (ref 0–13.4)
NEUTROPHILS # BLD AUTO: 3.22 K/UL (ref 2–7.15)
NEUTROPHILS NFR BLD: 60.8 % (ref 44–72)
NRBC # BLD AUTO: 0 K/UL
NRBC BLD-RTO: 0 /100 WBC
PLATELET # BLD AUTO: 268 K/UL (ref 164–446)
PMV BLD AUTO: 9.7 FL (ref 9–12.9)
RBC # BLD AUTO: 5.04 M/UL (ref 4.2–5.4)
WBC # BLD AUTO: 5.3 K/UL (ref 4.8–10.8)

## 2022-10-09 PROCEDURE — A9270 NON-COVERED ITEM OR SERVICE: HCPCS | Performed by: STUDENT IN AN ORGANIZED HEALTH CARE EDUCATION/TRAINING PROGRAM

## 2022-10-09 PROCEDURE — 770001 HCHG ROOM/CARE - MED/SURG/GYN PRIV*

## 2022-10-09 PROCEDURE — 85379 FIBRIN DEGRADATION QUANT: CPT

## 2022-10-09 PROCEDURE — A9270 NON-COVERED ITEM OR SERVICE: HCPCS | Performed by: NURSE PRACTITIONER

## 2022-10-09 PROCEDURE — 82962 GLUCOSE BLOOD TEST: CPT

## 2022-10-09 PROCEDURE — 97166 OT EVAL MOD COMPLEX 45 MIN: CPT

## 2022-10-09 PROCEDURE — 99232 SBSQ HOSP IP/OBS MODERATE 35: CPT | Performed by: INTERNAL MEDICINE

## 2022-10-09 PROCEDURE — A9270 NON-COVERED ITEM OR SERVICE: HCPCS | Performed by: INTERNAL MEDICINE

## 2022-10-09 PROCEDURE — 700111 HCHG RX REV CODE 636 W/ 250 OVERRIDE (IP): Performed by: NURSE PRACTITIONER

## 2022-10-09 PROCEDURE — 97162 PT EVAL MOD COMPLEX 30 MIN: CPT

## 2022-10-09 PROCEDURE — 85025 COMPLETE CBC W/AUTO DIFF WBC: CPT

## 2022-10-09 PROCEDURE — 99232 SBSQ HOSP IP/OBS MODERATE 35: CPT | Performed by: PSYCHIATRY & NEUROLOGY

## 2022-10-09 PROCEDURE — 83735 ASSAY OF MAGNESIUM: CPT

## 2022-10-09 PROCEDURE — 700102 HCHG RX REV CODE 250 W/ 637 OVERRIDE(OP): Performed by: NURSE PRACTITIONER

## 2022-10-09 PROCEDURE — 700102 HCHG RX REV CODE 250 W/ 637 OVERRIDE(OP): Performed by: STUDENT IN AN ORGANIZED HEALTH CARE EDUCATION/TRAINING PROGRAM

## 2022-10-09 PROCEDURE — 97535 SELF CARE MNGMENT TRAINING: CPT

## 2022-10-09 PROCEDURE — 700102 HCHG RX REV CODE 250 W/ 637 OVERRIDE(OP): Performed by: INTERNAL MEDICINE

## 2022-10-09 RX ORDER — ATORVASTATIN CALCIUM 40 MG/1
40 TABLET, FILM COATED ORAL DAILY
Status: DISCONTINUED | OUTPATIENT
Start: 2022-10-09 | End: 2022-10-10 | Stop reason: HOSPADM

## 2022-10-09 RX ORDER — LISINOPRIL 5 MG/1
5 TABLET ORAL
Status: DISCONTINUED | OUTPATIENT
Start: 2022-10-09 | End: 2022-10-10 | Stop reason: HOSPADM

## 2022-10-09 RX ADMIN — AMLODIPINE BESYLATE 10 MG: 5 TABLET ORAL at 06:13

## 2022-10-09 RX ADMIN — ATORVASTATIN CALCIUM 40 MG: 40 TABLET, FILM COATED ORAL at 11:31

## 2022-10-09 RX ADMIN — HYDRALAZINE HYDROCHLORIDE 10 MG: 20 INJECTION INTRAMUSCULAR; INTRAVENOUS at 09:51

## 2022-10-09 RX ADMIN — SENNOSIDES AND DOCUSATE SODIUM 2 TABLET: 50; 8.6 TABLET ORAL at 06:14

## 2022-10-09 RX ADMIN — LISINOPRIL 5 MG: 5 TABLET ORAL at 11:32

## 2022-10-09 RX ADMIN — LEVOTHYROXINE SODIUM 175 MCG: 0.17 TABLET ORAL at 06:14

## 2022-10-09 ASSESSMENT — PAIN DESCRIPTION - PAIN TYPE
TYPE: ACUTE PAIN
TYPE: ACUTE PAIN

## 2022-10-09 ASSESSMENT — GAIT ASSESSMENTS
DEVIATION: BRADYKINETIC
GAIT LEVEL OF ASSIST: STANDBY ASSIST
DISTANCE (FEET): 200

## 2022-10-09 ASSESSMENT — COGNITIVE AND FUNCTIONAL STATUS - GENERAL
SUGGESTED CMS G CODE MODIFIER DAILY ACTIVITY: CH
MOBILITY SCORE: 24
SUGGESTED CMS G CODE MODIFIER MOBILITY: CH
DAILY ACTIVITIY SCORE: 24

## 2022-10-09 ASSESSMENT — ACTIVITIES OF DAILY LIVING (ADL): TOILETING: INDEPENDENT

## 2022-10-09 NOTE — THERAPY
Occupational Therapy   Initial Evaluation     Patient Name: Brielle Krishnamurthy  Age:  57 y.o., Sex:  female  Medical Record #: [id number]  Today's Date: 10/9/2022     Comments: SPB<160    Assessment  Patient is 57 y.o. female admitted with headaches, dizziness, difficulty walking found to have punctate hemorrhage in nicola seen for OT evaluation. Pt presented with resolved neurological deficits with UE strength, coordination, ROM WFL, as well as cognition, balance, vision WFL. She was able to demonstrated dressing, txfs, and seated ADLs without assist. Today pt was primarily limited by BP seated EOB, RN aware. Defer to PT for further mobility recommendations. No further OT needs indicated. Patient will not be actively followed for occupational therapy services at this time, however may be seen if requested by physician for 1 more visit within 30 days to address any discharge or equipment needs.     Plan    Recommend Occupational Therapy for Evaluation only   DC Equipment Recommendations: (P) None  Discharge Recommendations: (P) Anticipate that the patient will have no further occupational therapy needs after discharge from the hospital        10/09/22 0950   Prior Living Situation   Prior Services Home-Independent   Housing / Facility 1 Story House   Bathroom Set up Walk In Shower   Equipment Owned None   Lives with - Patient's Self Care Capacity Adult Children   Comments Pt's adult children live with her and work during the day. Pt works night shifts   Prior Level of ADL Function   Self Feeding Independent   Grooming / Hygiene Independent   Bathing Independent   Dressing Independent   Toileting Independent   Prior Level of IADL Function   Medication Management Independent   Laundry Independent   Kitchen Mobility Independent   Finances Independent   Home Management Independent   Shopping Independent   Prior Level Of Mobility Independent Without Device in Community   Driving / Transportation Driving Independent    Occupation (Pre-Hospital Vocational) Employed Full Time   Vitals   Vitals Comments /112 sitting EOB, RN aware, deferred further mobility   Cognition    Cognition / Consciousness WDL   Active ROM Upper Body   Active ROM Upper Body  WDL   Strength Upper Body   Upper Body Strength  WDL   Coordination Upper Body   Coordination WDL   Balance Assessment   Sitting Balance (Static) Fair +   Sitting Balance (Dynamic) Fair +   Standing Balance (Static) Fair +   Standing Balance (Dynamic) Fair   Weight Shift Sitting Good   Weight Shift Standing Fair   Bed Mobility    Supine to Sit Supervised   Scooting Supervised   ADL Assessment   Grooming Supervision;Seated   Upper Body Dressing Supervision   Lower Body Dressing Supervision   How much help from another person does the patient currently need...   6 Clicks Daily Activity Score 24   Functional Mobility   Sit to Stand Supervised   Bed, Chair, Wheelchair Transfer Supervised   Comments limited by BP   Patient / Family Goals   Patient / Family Goal #1 to go home   Education Group   Role of Occupational Therapist Patient Response Patient;Acceptance;Explanation   Anticipated Discharge Equipment and Recommendations   DC Equipment Recommendations None   Discharge Recommendations Anticipate that the patient will have no further occupational therapy needs after discharge from the hospital

## 2022-10-09 NOTE — PROGRESS NOTES
4 Eyes Skin Assessment Completed by Liz, AYLIN and AYLIN Patel.    Head WDL  Ears WDL  Nose WDL  Mouth WDL  Neck WDL  Breast/Chest Redness and Bruising  Shoulder Blades WDL  Spine WDL  (R) Arm/Elbow/Hand WDL  (L) Arm/Elbow/Hand WDL  Abdomen Bruising  Groin Redness and Blanching  Scrotum/Coccyx/Buttocks WDL  (R) Leg WDL  (L) Leg WDL  (R) Heel/Foot/Toe WDL  (L) Heel/Foot/Toe WDL          Devices In Places Pulse Ox and SCD's      Interventions In Place N/A    Possible Skin Injury No    Pictures Uploaded Into Epic No, needs to be completed  Wound Consult Placed N/A  RN Wound Prevention Protocol Ordered No

## 2022-10-09 NOTE — PROGRESS NOTES
NEUROLOGY PROGRESS NOTE      BACKGROUND:    57 y.o. female was admitted on 10/7/2022  3:27 PM for Hemorrhagic cerebrovascular accident (CVA) (HCC) [I61.9].  She is being followed by Neurology for small right pontine hemorrhage.    SUBJECTIVE:   Continues to do well.  No new complaints.  No headache.  Neurologically intact.    VITALS:  Vitals:    10/09/22 0348 10/09/22 0755 10/09/22 0948 10/09/22 1100   BP:  (!) 153/91 (!) 176/110 (!) 145/74   Pulse:  76 74    Resp: 18 18     Temp: 36.5 °C (97.7 °F) 36.2 °C (97.2 °F)     TempSrc: Temporal Temporal     SpO2: 97%   93%   Weight:       Height:           NEUROLOGICAL EXAM:   Orientation to time, place, and person were normal.  Recent and remote memory were normal.  Attention span and concentration were normal.  Language (naming, repetition, spontaneous speech) was normal.  Fund of knowledge was normal for age and education.  All cranial nerves were normal: Pupils were equally round and reactive to light.  Motor: (tone, bulk and strength): 5/5 strength in both upper and lower extremity, proximal and distal - no abnormal movement noted.  Patient has normal muscle bulk and tone.  Sensation (all modalities) was normal  Reflexes were normal and symmetrical in both upper and lower extremities  Coordination (finger-to-nose, heel/knee/shin, rapid alternating movements) was normal. There was no ataxia, no tremors, and no dysmetria. Station and gait were normal    OBJECTIVE:    NEUROIMAGING:    MR-BRAIN-W/O   Final Result      1.  There is an approximately 1 cm sized subacute hemorrhage in the right paramedian nicola with associated developmental venous anomaly.   2.  Mild cerebral volume loss.   3.  Moderate chronic microvascular ischemic disease.      EC-ECHOCARDIOGRAM COMPLETE W/O CONT    (Results Pending)       MEDICATIONS:  Current Facility-Administered Medications   Medication Dose Route Frequency Provider Last Rate Last Admin    lisinopril (PRINIVIL) tablet 5 mg  5 mg Oral Q  DAY Casey Agrawal M.D.   5 mg at 10/09/22 1132    atorvastatin (LIPITOR) tablet 40 mg  40 mg Oral DAILY Casey Agrawal M.D.   40 mg at 10/09/22 1131    amLODIPine (NORVASC) tablet 10 mg  10 mg Oral Q DAY Lisa ERICKSON Mathews   10 mg at 10/09/22 0613    levothyroxine (SYNTHROID) tablet 175 mcg  175 mcg Oral AM ES Lisa ERICKSON Mathews   175 mcg at 10/09/22 0614    LORazepam (ATIVAN) injection 2 mg  2 mg Intravenous Q5 MIN PRN Silvia L. Latona        acetaminophen (Tylenol) tablet 650 mg  650 mg Oral Q4HRS PRN Silvia L. Latona        Or    acetaminophen (TYLENOL) suppository 650 mg  650 mg Rectal Q4HRS PRN Silvia L. Latona        labetalol (NORMODYNE/TRANDATE) injection 10 mg  10 mg Intravenous Q4HRS PRN Silvia L. Latona   10 mg at 10/08/22 1725    hydrALAZINE (APRESOLINE) injection 10 mg  10 mg Intravenous Q4HRS PRN Silvia L. Latona   10 mg at 10/09/22 0951    enalaprilat (Vasotec) injection 1.25 mg 1 mL  1.25 mg Intravenous Q6HRS PRN Silvia L. Latona        insulin regular (HumuLIN R,NovoLIN R) injection  1-6 Units Subcutaneous Q6HRS Silvia L. Latona        And    dextrose 50% (D50W) injection 25 g  25 g Intravenous Q15 MIN PRN Silvia L. Latona        senna-docusate (PERICOLACE or SENOKOT S) 8.6-50 MG per tablet 2 Tablet  2 Tablet Oral BID Silvia L. Latona   2 Tablet at 10/09/22 0614    And    polyethylene glycol/lytes (MIRALAX) PACKET 1 Packet  1 Packet Oral QDAY PRN Silvia L. Latona        And    magnesium hydroxide (MILK OF MAGNESIA) suspension 30 mL  30 mL Oral QDAY PRN Silvia L. Latona        And    bisacodyl (DULCOLAX) suppository 10 mg  10 mg Rectal QDAY PRN Silvia L. Latona        Respiratory Therapy Consult   Nebulization Continuous RT Silvia Peres           LABS:      Recent Labs     10/07/22  1320 10/08/22  0415 10/09/22  0415   WBC 9.2 6.2 5.3   RBC 5.64* 4.70 5.04   HEMOGLOBIN 14.6 12.4 13.0   HEMATOCRIT 44.9 38.4 41.7   MCV 79.6* 81.7 82.7   MCH 25.9* 26.4* 25.8*   MCHC 32.5* 32.3* 31.2*   RDW  42.7 45.5 46.5   PLATELETCT 314 215 268   MPV 10.0 10.1 9.7       Recent Labs     10/07/22  1320   SODIUM 141   POTASSIUM 4.1   CHLORIDE 103   CO2 24   GLUCOSE 163*   BUN 13       INR   Date Value Ref Range Status   10/07/2022 1.16 (H) 0.87 - 1.13 Final     Comment:     Reference range:  INR - Non-therapeutic Reference Range: 0.87-1.13  INR - Therapeutic Reference Range: 2.0-4.0       No results found for: POCINR  Lab Results   Component Value Date/Time    CREATININE 0.86 10/07/2022 1320     Lab Results   Component Value Date/Time    IFAFRICA >60 05/20/2021 1329    IFNOTAFR >60 05/20/2021 1329         ASSESSMENT AND PLAN:  57 y.o. woman with history of hypertension who presents with acute onset lightheadedness, vertigo and hypertension in the setting of a CT head showing possible pontine hemorrhage.  Brain MRI confirmed there is an approximately 1 cm sized subacute hemorrhage in the right paramedian nicola with associated developmental venous anomaly.  Case was discussed with neuro interventionist and no intervention deemed necessary at this time.  Continue with blood pressure management and keep systolic less than 160.  Keep head of bed elevated at 30 degrees.  Neurology will sign off, please call me if there is any question.

## 2022-10-09 NOTE — CARE PLAN
The patient is Stable - Low risk of patient condition declining or worsening    Shift Goals  Clinical Goals: safety, monitor neuro status  Patient Goals: comfort  Family Goals: oz    Progress made toward(s) clinical / shift goals:    Neuro stable, worked with PT/OT today, free from falls throughout the day  Problem: Pain - Standard  Goal: Alleviation of pain or a reduction in pain to the patient’s comfort goal  Outcome: Progressing   No pain    Patient is not progressing towards the following goals:

## 2022-10-09 NOTE — THERAPY
"Physical Therapy   Initial Evaluation     Patient Name: Brielle Krishnamurthy  Age:  57 y.o., Sex:  female  Medical Record #: [id number]  Today's Date: 10/9/2022     Precautions  Precautions: Fall Risk  Comments: SBP<160    Assessment  57 y.o. female who presented 10/7/2022 with past medical history of hypertension, hypothyroidism who comes into the hospital for headaches, difficulty walking, dizziness, vertigo while at work.  Patient presents close to her baseline. She is able to demo gait and stairs with supervision and cueing for pacing. She was educated on monitoring for signs and symptoms of overexertion and verbalizes understanding. Patient has no further acute care PT needs at this time.     Plan    Recommend Physical Therapy for Evaluation only     DC Equipment Recommendations: None  Discharge Recommendations: Anticipate that the patient will have no further physical therapy needs after discharge from the hospital       Subjective    \"I'm doing better I think\"     Objective       10/09/22 1100   Precautions   Precautions Fall Risk   Comments SBP<160   Vitals   Blood Pressure (!) 145/74   Pulse Oximetry 93 %   O2 (LPM) 0   Pain 0 - 10 Group   Therapist Pain Assessment Post Activity Pain Same as Prior to Activity;0   Prior Living Situation   Prior Services Home-Independent   Housing / Facility 1 Story House   Steps Into Home 12   Steps In Home 0   Bathroom Set up Walk In Shower   Equipment Owned None   Lives with - Patient's Self Care Capacity Adult Children   Comments Patient works the night shift at [employer]. Her kids working during the day, but her sisters will be coming in from [place] to assist as needed   Prior Level of Functional Mobility   Bed Mobility Independent   Transfer Status Independent   Ambulation Independent   Distance Ambulation (Feet)   (community distances)   Assistive Devices Used None   Stairs Independent   History of Falls   History of Falls No   Cognition    Cognition / Consciousness WDL "   Level of Consciousness Alert   Comments pleasant and cooperative.   Passive ROM Lower Body   Passive ROM Lower Body WDL   Active ROM Lower Body    Active ROM Lower Body  WDL   Strength Lower Body   Lower Body Strength  WDL   Sensation Lower Body   Lower Extremity Sensation   WDL   Lower Body Muscle Tone   Lower Body Muscle Tone  WDL   Coordination Upper Body   Coordination WDL   Coordination Lower Body    Coordination Lower Body  WDL   Balance Assessment   Sitting Balance (Static) Fair +   Sitting Balance (Dynamic) Fair +   Standing Balance (Static) Fair +   Standing Balance (Dynamic) Fair +   Weight Shift Sitting Good   Weight Shift Standing Good   Comments no AD   Gait Analysis   Gait Level Of Assist Standby Assist   Assistive Device None   Distance (Feet) 200   # of Times Distance was Traveled 1   Deviation Bradykinetic   # of Stairs Climbed 10   Level of Assist with Stairs Supervised   Bed Mobility    Supine to Sit Supervised   Sit to Supine Standby Assist   Scooting Supervised   Functional Mobility   Sit to Stand Supervised   Bed, Chair, Wheelchair Transfer Supervised   How much difficulty does the patient currently have...   Turning over in bed (including adjusting bedclothes, sheets and blankets)? 4   Sitting down on and standing up from a chair with arms (e.g., wheelchair, bedside commode, etc.) 4   Moving from lying on back to sitting on the side of the bed? 4   How much help from another person does the patient currently need...   Moving to and from a bed to a chair (including a wheelchair)? 4   Need to walk in a hospital room? 4   Climbing 3-5 steps with a railing? 4   6 clicks Mobility Score 24   Education Group   Education Provided Role of Physical Therapist;Gait Training;Cerebral Vascular Accident   Role of Physical Therapist Patient Response Patient;Acceptance;Demonstration;Explanation;Verbal Demonstration   Gait Training Patient Response Patient;Acceptance;Explanation;Demonstration;Verbal  Demonstration;Action Demonstration   CVA Patient Response Patient;Acceptance;Demonstration;Explanation;Verbal Demonstration;Action Demonstration   Anticipated Discharge Equipment and Recommendations   DC Equipment Recommendations None   Discharge Recommendations Anticipate that the patient will have no further physical therapy needs after discharge from the hospital     Megan Benavides, PT, DPT, GCS

## 2022-10-09 NOTE — PROGRESS NOTES
"Hospital Medicine Daily Progress Note    Date of Service  10/9/2022    Chief Complaint  Brielle Krishnamurthy is a 57 y.o. female admitted 10/7/2022 with Nausea, Headache, and Dizziness      Hospital Course  No notes on file  57 y.o. female who presented 10/7/2022 with past medical history of hypertension, hypothyroidism who comes into the hospital for headaches, difficulty walking, dizziness, vertigo while at work.  Associated with nausea and vomiting.  Patient with presented with a blood sugar 222/130.  CT CT scan found punctate hemorrhages in the nicola.  MRI confirmed a small 1 cm subacute hemorrhage in the paramedian nicola associated with a venous abnormality.  Patient was monitored in ICU for 24 hours.  She was placed on a Cardene drip and was transitioned to Norvasc.  Started on 4 L of oxygen.  Currently patient states that her headaches and symptoms have resolved.  She still has trouble walking.     Chest x-ray from yesterday found to increase intravascular congestion and cardiomegaly\"    Dr. Vazquez  Interval Problem Update  10/9. Stable but still hypertensive, and on O2. Echo pending.    I have discussed this patient's plan of care and discharge plan at IDT rounds today with Case Management, Nursing, Nursing leadership, and other members of the IDT team.    Consultants/Specialty  Neurology    Code Status  Full Code    Disposition  Patient is not medically cleared for discharge.   Anticipate discharge to  home vs [facility] .  I have placed the appropriate orders for post-discharge needs.    Review of Systems  ROS     Physical Exam  Temp:  [36.2 °C (97.2 °F)-36.9 °C (98.4 °F)] 36.2 °C (97.2 °F)  Pulse:  [65-76] 74  Resp:  [18] 18  BP: (125-176)/() 145/74  SpO2:  [91 %-97 %] 93 %    Physical Exam  Vitals and nursing note reviewed.   Constitutional:       General: She is not in acute distress.     Appearance: She is obese.   HENT:      Head: Normocephalic and atraumatic.      Right Ear: External ear normal.      " Left Ear: External ear normal.      Nose: Nose normal.      Mouth/Throat:      Mouth: Mucous membranes are moist.   Eyes:      General: No scleral icterus.     Conjunctiva/sclera: Conjunctivae normal.   Cardiovascular:      Rate and Rhythm: Normal rate and regular rhythm.      Pulses: Normal pulses.      Heart sounds: No murmur heard.    No friction rub. No gallop.   Pulmonary:      Effort: Pulmonary effort is normal. No respiratory distress.      Breath sounds: Normal breath sounds. No stridor. No wheezing, rhonchi or rales.   Chest:      Chest wall: No tenderness.   Abdominal:      General: Abdomen is flat. Bowel sounds are normal. There is no distension.      Palpations: Abdomen is soft.      Tenderness: There is no abdominal tenderness. There is no guarding or rebound.   Musculoskeletal:         General: No swelling, tenderness or deformity. Normal range of motion.      Cervical back: Normal range of motion and neck supple. No rigidity.   Skin:     General: Skin is warm.      Coloration: Skin is not jaundiced.   Neurological:      General: No focal deficit present.      Mental Status: She is alert and oriented to person, place, and time. Mental status is at baseline.      Motor: Weakness present.   Psychiatric:         Mood and Affect: Mood normal.         Behavior: Behavior normal.         Thought Content: Thought content normal.         Judgment: Judgment normal.       Fluids  No intake or output data in the 24 hours ending 10/09/22 1651    Laboratory  Recent Labs     10/07/22  1320 10/08/22  0415 10/09/22  0415   WBC 9.2 6.2 5.3   RBC 5.64* 4.70 5.04   HEMOGLOBIN 14.6 12.4 13.0   HEMATOCRIT 44.9 38.4 41.7   MCV 79.6* 81.7 82.7   MCH 25.9* 26.4* 25.8*   MCHC 32.5* 32.3* 31.2*   RDW 42.7 45.5 46.5   PLATELETCT 314 215 268   MPV 10.0 10.1 9.7     Recent Labs     10/07/22  1320   SODIUM 141   POTASSIUM 4.1   CHLORIDE 103   CO2 24   GLUCOSE 163*   BUN 13   CREATININE 0.86   CALCIUM 9.5     Recent Labs      "10/07/22  1320   APTT 31.4   INR 1.16*         Recent Labs     10/07/22  1628   TRIGLYCERIDE 103   HDL 57   *       Imaging  MR-BRAIN-W/O   Final Result      1.  There is an approximately 1 cm sized subacute hemorrhage in the right paramedian nicola with associated developmental venous anomaly.   2.  Mild cerebral volume loss.   3.  Moderate chronic microvascular ischemic disease.      EC-ECHOCARDIOGRAM COMPLETE W/O CONT    (Results Pending)        Assessment/Plan  * Pontine hemorrhage, hypoxia, HTN urgency, dyslipidemia (HCC)  Assessment & Plan  Likely secondary to hypertensive emergency   keep blood pressure less than 160  Patient states that her symptoms have mostly resolved now.  Every 4 hours neurochecks  PT OT eval\"    Home but she did mention she was slightly \"unsteady\"  Treat BP  Ordered Ddimer and echo pending.    Acute respiratory failure with hypoxia (HCC)  Assessment & Plan  Likely secondary to pulmonary edema  Check cardiac echo  On 4 L of O2\"    Ddimer negative  Nocturnal hypoxia    Essential hypertension- (present on admission)  Assessment & Plan  Presented with hypertensive emergency  Uncontrolled  Continue Norvasc 10 mg  IV as needed medications have been ordered  Low-salt diet  Obtain a cardiac echo  Explained to the patient since she is hypoxic at nighttime while sleeping she may have sleep apnea.  This can cause uncontrolled blood pressure.  She will need a sleep study as an outpatient\"    Vitals:    10/09/22 1100   BP: (!) 145/74   Pulse:    Resp:    Temp:    SpO2: 93%     Started lisinopril as well    Morbid obesity with BMI of 40.0-44.9, adult (HCC)- (present on admission)  Assessment & Plan  Recommended weight loss advised, 5% through reduced calorie, low carb diet and 150 mins of exercise a week once better  Recommend bariatric surgery evaluation if morbidly obese  Educated on the increase of morbidity and mortality associated with excess weight including DM, Heart Disease, HTN, stroke, " and sleep apnea. Recommended outpatient monitoring  of blood sugars, lipid panel, sleep study evaluation for metabolic syndrome.      Pure hypercholesterolemia- (present on admission)  Assessment & Plan    Start statin     Postoperative hypothyroidism- (present on admission)  Assessment & Plan  Continue home thyroid medication       VTE prophylaxis: SCDs/TEDs    I have performed a physical exam and reviewed and updated ROS and Plan today (10/9/2022). In review of yesterday's note (10/8/2022), there are no changes except as documented above.

## 2022-10-09 NOTE — PROGRESS NOTES
Pt's BP elevated while was working with PT/OT, 176/110. Prn BP med given per MAR. Pt sitting in a chair. Will cont to monitor

## 2022-10-09 NOTE — ASSESSMENT & PLAN NOTE
Recommended weight loss advised, 5% through reduced calorie, low carb diet and 150 mins of exercise a week once better  Recommend bariatric surgery evaluation if morbidly obese  Educated on the increase of morbidity and mortality associated with excess weight including DM, Heart Disease, HTN, stroke, and sleep apnea. Recommended outpatient monitoring  of blood sugars, lipid panel, sleep study evaluation for metabolic syndrome.

## 2022-10-09 NOTE — PROGRESS NOTES
Report called to Neurosciences RN. Pt left with all belongings with care aid transporters x 2. Two sisters following

## 2022-10-09 NOTE — CARE PLAN
The patient is Stable - Low risk of patient condition declining or worsening    Shift Goals  Clinical Goals: Monitor neuro status  Patient Goals: Sleep  Family Goals: YOU    Progress made toward(s) clinical / shift goals:    Problem: Pain - Standard  Goal: Alleviation of pain or a reduction in pain to the patient’s comfort goal  Outcome: Progressing     Problem: Knowledge Deficit - Standard  Goal: Patient and family/care givers will demonstrate understanding of plan of care, disease process/condition, diagnostic tests and medications  Outcome: Progressing       Patient is not progressing towards the following goals:

## 2022-10-10 ENCOUNTER — APPOINTMENT (OUTPATIENT)
Dept: CARDIOLOGY | Facility: MEDICAL CENTER | Age: 57
DRG: 064 | End: 2022-10-10
Attending: INTERNAL MEDICINE
Payer: COMMERCIAL

## 2022-10-10 VITALS
DIASTOLIC BLOOD PRESSURE: 105 MMHG | HEART RATE: 74 BPM | WEIGHT: 293 LBS | BODY MASS INDEX: 43.4 KG/M2 | RESPIRATION RATE: 18 BRPM | SYSTOLIC BLOOD PRESSURE: 152 MMHG | HEIGHT: 69 IN | OXYGEN SATURATION: 94 % | TEMPERATURE: 97.2 F

## 2022-10-10 LAB
BASOPHILS # BLD AUTO: 1.5 % (ref 0–1.8)
BASOPHILS # BLD: 0.09 K/UL (ref 0–0.12)
EOSINOPHIL # BLD AUTO: 0.2 K/UL (ref 0–0.51)
EOSINOPHIL NFR BLD: 3.4 % (ref 0–6.9)
ERYTHROCYTE [DISTWIDTH] IN BLOOD BY AUTOMATED COUNT: 46.4 FL (ref 35.9–50)
HCT VFR BLD AUTO: 41.4 % (ref 37–47)
HGB BLD-MCNC: 13.2 G/DL (ref 12–16)
IMM GRANULOCYTES # BLD AUTO: 0.04 K/UL (ref 0–0.11)
IMM GRANULOCYTES NFR BLD AUTO: 0.7 % (ref 0–0.9)
LV EJECT FRACT  99904: 60
LV EJECT FRACT MOD 2C 99903: 37.59
LV EJECT FRACT MOD 4C 99902: 46.3
LV EJECT FRACT MOD BP 99901: 41.63
LYMPHOCYTES # BLD AUTO: 1.64 K/UL (ref 1–4.8)
LYMPHOCYTES NFR BLD: 27.6 % (ref 22–41)
MAGNESIUM SERPL-MCNC: 1.9 MG/DL (ref 1.5–2.5)
MCH RBC QN AUTO: 26.1 PG (ref 27–33)
MCHC RBC AUTO-ENTMCNC: 31.9 G/DL (ref 33.6–35)
MCV RBC AUTO: 81.8 FL (ref 81.4–97.8)
MONOCYTES # BLD AUTO: 0.48 K/UL (ref 0–0.85)
MONOCYTES NFR BLD AUTO: 8.1 % (ref 0–13.4)
NEUTROPHILS # BLD AUTO: 3.5 K/UL (ref 2–7.15)
NEUTROPHILS NFR BLD: 58.7 % (ref 44–72)
NRBC # BLD AUTO: 0 K/UL
NRBC BLD-RTO: 0 /100 WBC
PLATELET # BLD AUTO: 263 K/UL (ref 164–446)
PMV BLD AUTO: 9.9 FL (ref 9–12.9)
RBC # BLD AUTO: 5.06 M/UL (ref 4.2–5.4)
WBC # BLD AUTO: 6 K/UL (ref 4.8–10.8)

## 2022-10-10 PROCEDURE — A9270 NON-COVERED ITEM OR SERVICE: HCPCS | Performed by: STUDENT IN AN ORGANIZED HEALTH CARE EDUCATION/TRAINING PROGRAM

## 2022-10-10 PROCEDURE — 700102 HCHG RX REV CODE 250 W/ 637 OVERRIDE(OP): Performed by: NURSE PRACTITIONER

## 2022-10-10 PROCEDURE — 83735 ASSAY OF MAGNESIUM: CPT

## 2022-10-10 PROCEDURE — A9270 NON-COVERED ITEM OR SERVICE: HCPCS | Performed by: NURSE PRACTITIONER

## 2022-10-10 PROCEDURE — 99239 HOSP IP/OBS DSCHRG MGMT >30: CPT | Performed by: INTERNAL MEDICINE

## 2022-10-10 PROCEDURE — 700102 HCHG RX REV CODE 250 W/ 637 OVERRIDE(OP): Performed by: STUDENT IN AN ORGANIZED HEALTH CARE EDUCATION/TRAINING PROGRAM

## 2022-10-10 PROCEDURE — 700117 HCHG RX CONTRAST REV CODE 255: Performed by: INTERNAL MEDICINE

## 2022-10-10 PROCEDURE — 93306 TTE W/DOPPLER COMPLETE: CPT | Mod: 26 | Performed by: INTERNAL MEDICINE

## 2022-10-10 PROCEDURE — 700102 HCHG RX REV CODE 250 W/ 637 OVERRIDE(OP): Performed by: INTERNAL MEDICINE

## 2022-10-10 PROCEDURE — A9270 NON-COVERED ITEM OR SERVICE: HCPCS | Performed by: INTERNAL MEDICINE

## 2022-10-10 PROCEDURE — 93306 TTE W/DOPPLER COMPLETE: CPT

## 2022-10-10 PROCEDURE — 85025 COMPLETE CBC W/AUTO DIFF WBC: CPT

## 2022-10-10 RX ORDER — CALCIUM CARBONATE 500 MG/1
500 TABLET, CHEWABLE ORAL 3 TIMES DAILY PRN
COMMUNITY
Start: 2022-10-10 | End: 2022-10-27

## 2022-10-10 RX ORDER — ONDANSETRON 2 MG/ML
4 INJECTION INTRAMUSCULAR; INTRAVENOUS EVERY 4 HOURS PRN
Status: DISCONTINUED | OUTPATIENT
Start: 2022-10-10 | End: 2022-10-10

## 2022-10-10 RX ORDER — LISINOPRIL 5 MG/1
5 TABLET ORAL DAILY
Qty: 30 TABLET | Refills: 0 | Status: SHIPPED | OUTPATIENT
Start: 2022-10-11 | End: 2022-10-13

## 2022-10-10 RX ORDER — ALUMINA, MAGNESIA, AND SIMETHICONE 2400; 2400; 240 MG/30ML; MG/30ML; MG/30ML
10 SUSPENSION ORAL 4 TIMES DAILY PRN
Qty: 560 ML | COMMUNITY
Start: 2022-10-10 | End: 2022-10-27

## 2022-10-10 RX ORDER — PROMETHAZINE HYDROCHLORIDE 6.25 MG/5ML
25 SYRUP ORAL
Status: DISCONTINUED | OUTPATIENT
Start: 2022-10-10 | End: 2022-10-10

## 2022-10-10 RX ORDER — AMLODIPINE BESYLATE 10 MG/1
10 TABLET ORAL DAILY
Qty: 30 TABLET | Refills: 0 | Status: SHIPPED | OUTPATIENT
Start: 2022-10-11 | End: 2022-11-08 | Stop reason: SDUPTHER

## 2022-10-10 RX ORDER — POLYETHYLENE GLYCOL 3350 17 G/17G
POWDER, FOR SOLUTION ORAL
Refills: 3 | COMMUNITY
Start: 2022-10-10 | End: 2022-10-27

## 2022-10-10 RX ORDER — ATORVASTATIN CALCIUM 40 MG/1
40 TABLET, FILM COATED ORAL DAILY
Qty: 30 TABLET | Refills: 0 | Status: SHIPPED | OUTPATIENT
Start: 2022-10-11 | End: 2022-11-08 | Stop reason: SDUPTHER

## 2022-10-10 RX ORDER — ACETAMINOPHEN 325 MG/1
650 TABLET ORAL EVERY 4 HOURS PRN
Qty: 30 TABLET | Refills: 0 | COMMUNITY
Start: 2022-10-10 | End: 2022-10-27

## 2022-10-10 RX ORDER — AMOXICILLIN 250 MG
2 CAPSULE ORAL 2 TIMES DAILY
Qty: 30 TABLET | Refills: 0 | COMMUNITY
Start: 2022-10-10 | End: 2022-10-27

## 2022-10-10 RX ADMIN — AMLODIPINE BESYLATE 10 MG: 5 TABLET ORAL at 05:07

## 2022-10-10 RX ADMIN — LISINOPRIL 5 MG: 5 TABLET ORAL at 05:07

## 2022-10-10 RX ADMIN — SENNOSIDES AND DOCUSATE SODIUM 2 TABLET: 50; 8.6 TABLET ORAL at 05:07

## 2022-10-10 RX ADMIN — ATORVASTATIN CALCIUM 40 MG: 40 TABLET, FILM COATED ORAL at 05:07

## 2022-10-10 RX ADMIN — LEVOTHYROXINE SODIUM 175 MCG: 0.17 TABLET ORAL at 05:07

## 2022-10-10 RX ADMIN — HUMAN ALBUMIN MICROSPHERES AND PERFLUTREN 3 ML: 10; .22 INJECTION, SOLUTION INTRAVENOUS at 11:00

## 2022-10-10 ASSESSMENT — PAIN DESCRIPTION - PAIN TYPE: TYPE: ACUTE PAIN

## 2022-10-10 NOTE — DISCHARGE PLANNING
Recommendation for [facility] to use. Choice form presented to pt. And choice is [facility].  Pt signed and dated.  Have sent choice to [facility], order and Face to Face in.      Awaiting arrival.      Pt. Would also would like Meds to Bed. Have contacted RN.      Also, have set up [place] transport for pt. At 1500 today.  Ref. # [id number].    Spoke w/Ian, set up for [id number]. Informed that she will be receiving a text what time  will arrive, name of  and vehicle number.  They will not call her.      Have informed pt and nurse of this.      If procedural is not done in time; will cancel and reschedule transport.

## 2022-10-10 NOTE — DISCHARGE PLANNING
Care Transition Team Assessment    Met w/pt. At bedside to complete assessment.    Pt very independent  and drives but doesn't have a car at this time.  She lives by her self in an apt.  No steps into apt.       She uses a cane at times.     Pt has no Adv. Directive and didn't want any inform. On it.     Her main support is her son, Kamari Arreguin. He lives in [place] @ [phone].       Information Source  Orientation Level: Oriented X4  Information Given By: Patient  Informant's Name: Patient  Who is responsible for making decisions for patient? : Patient    Readmission Evaluation  Is this a readmission?: No    Elopement Risk  Legal Hold: No  Ambulatory or Self Mobile in Wheelchair: No-Not an Elopement Risk  Elopement Risk: Not at Risk for Elopement    Interdisciplinary Discharge Planning  Primary Care Physician: Fletcher Guevara  Lives with - Patient's Self Care Capacity: Child Less than 18 Years of Age  Patient or legal guardian wants to designate a caregiver: No  Support Systems: Children (Son, 17 years of age & Friend Elver Blevins @ [phone])  Housing / Facility: 1 Story Apartment / Condo (on 2nd floor  18 steps up to apt. and elevator)  Mobility Issues: No  Prior Services: Home-Independent  Patient Prefers to be Discharged to:: Home  Durable Medical Equipment: Not Applicable    Discharge Preparedness  What is your plan after discharge?: Home with help  What are your discharge supports?: Child, Other (comment) (Friend)  Prior Functional Level: Ambulatory, Independent with Activities of Daily Living    Functional Assesment  Prior Functional Level: Ambulatory, Independent with Activities of Daily Living    Advance Directive  Advance Directive?: None  Advance Directive offered?: AD Booklet given    Domestic Abuse  Have you ever been the victim of abuse or violence?: No  Physical Abuse or Sexual Abuse: No  Verbal Abuse or Emotional Abuse: No  Possible Abuse/Neglect Reported to:: Not Applicable    Pt. Is  having a procedural preformed today prior to leaving.

## 2022-10-10 NOTE — DISCHARGE PLANNING
Brielle is not requiring 2 of 3 disciplines. TCC will no longer follow.  Please reach out to myself with any interval changes/questions.

## 2022-10-10 NOTE — DISCHARGE PLANNING
Care Transition Team Assessment    Met w/pt. At bedside to complete assessment.     Pt is very independent and drives.      Pt lives in an apartment on 2nd floor with 18 steps up to apt. and lives w/her 1 child who is 17 years old.      She uses no DME's.      She has no Adv. Directive and requested packet and given.     Her main support is Felicity Raya @ [phone] and she will p/u pt. Also.    She will also  pt  Information Source  Orientation Level: Oriented X4  Information Given By: Patient  Informant's Name: Patient  Who is responsible for making decisions for patient? : Patient    Readmission Evaluation  Is this a readmission?: No    Elopement Risk  Legal Hold: No  Ambulatory or Self Mobile in Wheelchair: No-Not an Elopement Risk  Elopement Risk: Not at Risk for Elopement    Interdisciplinary Discharge Planning  Primary Care Physician: Fletcher Guevara  Lives with - Patient's Self Care Capacity: Child Less than 18 Years of Age  Patient or legal guardian wants to designate a caregiver: No  Support Systems: Children (Son, 17 years of age & Friend FelicityElver @ [phone])  Housing / Facility: 1 Story Apartment / Condo (on 2nd floor  18 steps up to apt. and elevator)  Mobility Issues: No  Prior Services: Home-Independent  Patient Prefers to be Discharged to:: Home  Durable Medical Equipment: Not Applicable    Discharge Preparedness  What is your plan after discharge?: Home with help  What are your discharge supports?: Child, Other (comment) (Friend)  Prior Functional Level: Ambulatory, Independent with Activities of Daily Living    Functional Assesment  Prior Functional Level: Ambulatory, Independent with Activities of Daily Living                   Advance Directive  Advance Directive?: None  Advance Directive offered?: AD Booklet given    Domestic Abuse  Have you ever been the victim of abuse or violence?: No  Physical Abuse or Sexual Abuse: No  Verbal Abuse or Emotional Abuse: No  Possible  Abuse/Neglect Reported to:: Not Applicable

## 2022-10-10 NOTE — DISCHARGE INSTRUCTIONS
Diet    Heart Healthy Diet    A Heart-Healthy diet includes increasing healthy fats and limiting unhealthy fats.  Focus on eating a balance of foods, including fruits and vegetables, low-fat or nonfat dairy, lean protein, nuts and legumes, whole grains, and heart-healthy oils and fats.  Monitor your salt (sodium) intake, especially if you have high blood pressure.  Lose weight if you are overweight. Losing just 5-10% of your body weight can help your overall health and prevent diseases such as diabetes and heart disease.    Activity    Resume Your Normal Activity    You may resume your normal activity as tolerated.  Rest as needed.      Discharge Instructions    Discharged to home by car with relative. Discharged via wheelchair, hospital escort: Yes.  Special equipment needed: Not Applicable    Be sure to schedule a follow-up appointment with your primary care doctor or any specialists as instructed.     Discharge Plan:   Diet Plan: Discussed  Activity Level: Discussed  Confirmed Follow up Appointment: Appointment Scheduled  Confirmed Symptoms Management: Discussed  Medication Reconciliation Updated: Yes  Influenza Vaccine Indication: Patient Refuses    I understand that a diet low in cholesterol, fat, and sodium is recommended for good health. Unless I have been given specific instructions below for another diet, I accept this instruction as my diet prescription.   Other diet:     Special Instructions: None    -Is this patient being discharged with medication to prevent blood clots?  No    Is patient discharged on Warfarin / Coumadin?   No

## 2022-10-10 NOTE — DISCHARGE PLANNING
Care Transition Team Final Discharge Disposition    Actual Discharge Information  Discharge Disposition: Discharged to home/self care (01)    Pt picked up by friend, Felicity.     No other needs at this time.

## 2022-10-10 NOTE — CARE PLAN
The patient is Stable - Low risk of patient condition declining or worsening    Shift Goals  Clinical Goals: Monitor neuro status  Patient Goals: Sleep  Family Goals: oz    Progress made toward(s) clinical / shift goals:      Problem: Neuro Status  Goal: Neuro status will remain stable or improve  Outcome: Progressing    Q4H neuro checks in place. Pt's neurological status (A/Ox4) remains unchanged throughout shift. Bed alarm is on, call light within reach.     Patient is not progressing towards the following goals:

## 2022-10-10 NOTE — DIETARY
NUTRITION SERVICES: BMI - Pt with BMI >40 (=Body mass index is 43.63 kg/m².), Class III obesity. Weight taken via bed scale and is +1.3L fluids per I/O. Weight loss counseling not appropriate in acute care setting. RECOMMEND - If appropriate at DC please refer to outpatient nutrition services for weight management.

## 2022-10-13 ENCOUNTER — OFFICE VISIT (OUTPATIENT)
Dept: MEDICAL GROUP | Facility: PHYSICIAN GROUP | Age: 57
End: 2022-10-13
Payer: COMMERCIAL

## 2022-10-13 VITALS
WEIGHT: 293 LBS | HEIGHT: 69 IN | HEART RATE: 90 BPM | OXYGEN SATURATION: 94 % | SYSTOLIC BLOOD PRESSURE: 145 MMHG | DIASTOLIC BLOOD PRESSURE: 105 MMHG | TEMPERATURE: 97 F | BODY MASS INDEX: 43.4 KG/M2

## 2022-10-13 DIAGNOSIS — E78.00 PURE HYPERCHOLESTEROLEMIA: ICD-10-CM

## 2022-10-13 DIAGNOSIS — E66.01 MORBID OBESITY WITH BMI OF 40.0-44.9, ADULT (HCC): ICD-10-CM

## 2022-10-13 DIAGNOSIS — E89.0 POSTOPERATIVE HYPOTHYROIDISM: ICD-10-CM

## 2022-10-13 DIAGNOSIS — I10 ESSENTIAL HYPERTENSION: ICD-10-CM

## 2022-10-13 PROCEDURE — 99214 OFFICE O/P EST MOD 30 MIN: CPT | Performed by: NURSE PRACTITIONER

## 2022-10-13 RX ORDER — LISINOPRIL 20 MG/1
20 TABLET ORAL DAILY
Qty: 30 TABLET | Refills: 0 | Status: SHIPPED | OUTPATIENT
Start: 2022-10-13 | End: 2022-11-28

## 2022-10-13 ASSESSMENT — ENCOUNTER SYMPTOMS
VOMITING: 0
DEPRESSION: 0
HEADACHES: 0
PALPITATIONS: 0
DOUBLE VISION: 0
NAUSEA: 0
DIARRHEA: 0
WEIGHT LOSS: 0
CHILLS: 0
SHORTNESS OF BREATH: 0
DIZZINESS: 1
FEVER: 0
BLURRED VISION: 0
NERVOUS/ANXIOUS: 1

## 2022-10-13 ASSESSMENT — PATIENT HEALTH QUESTIONNAIRE - PHQ9: CLINICAL INTERPRETATION OF PHQ2 SCORE: 0

## 2022-10-13 ASSESSMENT — FIBROSIS 4 INDEX: FIB4 SCORE: 1.07

## 2022-10-13 NOTE — PROGRESS NOTES
Subjective:     Chief Complaint   Patient presents with    Transitional Care Management Hospital Follow-up     For Brain Bleed        HPI:   Brielle presents today with     Problem   Essential Hypertension    Chronic in nature. Unstable. Reports monitoring blood pressure at home daily, ranging 130-140/. Currently taking taking lisinopril 5 mg PO daily and amlodipine 10 mg PO daily. She denies chest pain, blurred vision, shortness of breath, palpations, and numbness and tingling. States occasional dizziness.     Pure Hypercholesterolemia    Chronic in nature. Currently taking atorvastatin 40 mg PO daily, tolerating mediation well without side effects. Reports that diet is okay and she has been keeping her sodium intake below 1,000 mg daily. She expresses interest in a referral to a registered dietician for assistance with meal planning. States that she is not routinely exercising, but walks 5 miles daily at her job at [employer].     Postoperative Hypothyroidism    Chronic in nature. Stable. Currently taking synthroid 175 mcg PO daily. States that she continues to have persistent fatigue, irritability, and weight gain. Requesting referral to [facility].         Current Outpatient Medications Ordered in Epic   Medication Sig Dispense Refill    lisinopril (PRINIVIL) 20 MG Tab Take 1 Tablet by mouth every day. 30 Tablet 0    acetaminophen (TYLENOL) 325 MG Tab Take 2 Tablets by mouth every four hours as needed for Mild Pain, Moderate Pain or Fever (Temperature greater than 100.4 F (38 C)). 30 Tablet 0    amLODIPine (NORVASC) 10 MG Tab Take 1 Tablet by mouth every day. 30 Tablet 0    atorvastatin (LIPITOR) 40 MG Tab Take 1 Tablet by mouth every day. 30 Tablet 0    senna-docusate (PERICOLACE OR SENOKOT S) 8.6-50 MG Tab Take 2 Tablets by mouth 2 times a day. 30 Tablet 0    polyethylene glycol/lytes (MIRALAX) 17 g Pack Take  by mouth 1 time a day as needed (if sennosides and docusate ineffective  "after 24 hours).  3    calcium carbonate (TUMS) 500 MG Chew Tab Chew 1 Tablet 3 times a day as needed (nausea indigestion).      mag hydrox-al hydrox-simeth (MAALOX PLUS ES OR MYLANTA DS) 400-400-40 MG/5ML Suspension Take 10 mL by mouth 4 times a day as needed. 560 mL     Multiple Vitamins-Minerals (ZINC PO) Take 1 Tablet by mouth every morning.      SELENIUM PO Take 1 Tablet by mouth every morning.      Ascorbic Acid (VITAMIN C PO) Take 1 Tablet by mouth every evening.      levothyroxine (SYNTHROID) 175 MCG Tab Take 1 Tablet by mouth every morning on an empty stomach. BRAND NAME SYNTHROID 90 Tablet 0    Magnesium 400 MG CAPS Take 400 mg by mouth every morning.      Cholecalciferol (VITAMIN D-3) 5000 UNITS TABS Take 2 Tablets by mouth every evening.       No current Epic-ordered facility-administered medications on file.       Health Maintenance: Deferred at this time, will discuss at her next appointment     Review of Systems   Constitutional:  Positive for malaise/fatigue. Negative for chills, fever and weight loss.   Eyes:  Negative for blurred vision and double vision.   Respiratory:  Negative for shortness of breath.    Cardiovascular:  Negative for chest pain, palpitations and leg swelling.   Gastrointestinal:  Negative for diarrhea, nausea and vomiting.   Neurological:  Positive for dizziness. Negative for headaches.   Psychiatric/Behavioral:  Negative for depression and suicidal ideas. The patient is nervous/anxious.    All other systems reviewed and are negative.      Objective:     Exam:  BP (!) 145/105 (BP Location: Left arm, Patient Position: Sitting, BP Cuff Size: Large adult) Comment: Pt home cuff  Pulse 90   Temp 36.1 °C (97 °F) (Temporal)   Ht 1.753 m (5' 9\")   Wt (!) 134 kg (294 lb 12.8 oz)   LMP 06/10/2014   SpO2 94%   BMI 43.53 kg/m²  Body mass index is 43.53 kg/m².    Physical Exam  Constitutional:       Appearance: Normal appearance.   HENT:      Head: Normocephalic.   Eyes:      Pupils: " Pupils are equal, round, and reactive to light.   Cardiovascular:      Rate and Rhythm: Normal rate and regular rhythm.      Pulses: Normal pulses.      Heart sounds: Normal heart sounds.   Pulmonary:      Effort: Pulmonary effort is normal.      Breath sounds: Normal breath sounds.   Musculoskeletal:         General: Normal range of motion.      Cervical back: Normal range of motion and neck supple.   Skin:     General: Skin is warm and dry.      Capillary Refill: Capillary refill takes less than 2 seconds.   Neurological:      General: No focal deficit present.      Mental Status: She is alert and oriented to person, place, and time.   Psychiatric:         Mood and Affect: Mood normal.     Assessment & Plan:     57 y.o. female with the following -     Problem List Items Addressed This Visit       Essential hypertension     -Increase lisinopril to 20 mg PO daily.  -Continue amlodipine 10 mg PO daily.  -Continue monitoring blood pressure daily. Schedule a follow-up appointment in 1 month to review effectiveness of higher dose of lisinopril and assess her home blood pressure.         Relevant Medications    lisinopril (PRINIVIL) 20 MG Tab    Other Relevant Orders    Referral to Vascular Medicine    Morbid obesity with BMI of 40.0-44.9, adult (HCC)    Relevant Orders    Referral to Nutrition Services    Postoperative hypothyroidism     -Continue synthroid 175 mcg PO daily.   -Referral placed to endocrinology.         Relevant Orders    Referral to Endocrinology    Pure hypercholesterolemia     -Referral placed to registered dietician.  -Encourage healthy diet and nutrition and adequate intake of fruits and vegetables.  -Encourage routine exercise.          Relevant Medications    lisinopril (PRINIVIL) 20 MG Tab         Return in about 1 month (around 11/13/2022) for HTN, anxiety.      Please note that this dictation was created using voice recognition software. I have made every reasonable attempt to correct obvious  errors, but I expect that there are errors of grammar and possibly content that I did not discover before finalizing the note.

## 2022-10-13 NOTE — ASSESSMENT & PLAN NOTE
-Increase lisinopril to 20 mg PO daily.  -Continue amlodipine 10 mg PO daily.  -Continue monitoring blood pressure daily. Schedule a follow-up appointment in 1 month to review effectiveness of higher dose of lisinopril and assess her home blood pressure.

## 2022-10-13 NOTE — Clinical Note
"57-year-old female patient with continued poorly controlled blood pressure after recent hospitalization for Hypertensive emergency and \"approximately 1 cm sized subacute hemorrhage in the right paramedian nicola with associated developmental venous anomaly\". BP in office 145/105.  Is currently on amlodipine 10 mg and 20.5 mg, we increased her lisinopril today as she is most comfortable with this medication.  You have a recommendation for better blood pressure medication?  I did send a referral over to your office because she has had multiple years of difficulty managing her high blood pressure compounded by poor follow-up.  Thank you,  Fletcher Olvera, JAIMIE "

## 2022-10-13 NOTE — ASSESSMENT & PLAN NOTE
-Referral placed to registered dietician.  -Encourage healthy diet and nutrition and adequate intake of fruits and vegetables.  -Encourage routine exercise.

## 2022-10-13 NOTE — NON-PROVIDER
Chronic in nature. Stable. Currently taking synthroid 175 mcg PO daily. Requesting referral to [facility].  -Continue synthroid 175 mcg PO daily.   -Referral placed to endocrinology.    Reports monitoring blood pressure at home, ranging 130-140/. Currently taking taking lisinopril 5 mg PO daily and amlodipine 10 mg PO daily. Denies chest pain, shortness of breath, palpations, swelling to legs, numbness and tingling. Occasional dizziness.  -Increase lisinopril to 20 mg PO daily.  -Continue amlodipine 10 mg PO daily.  -Continue monitoring blood pressure daily.    Currently taking atorvastatin 40 mg PO daily, tolerating mediation well without side effects. Reports that diet is okay and she has been keeping her sodium intake below 1,000 mg daily. She expresses interest in a referral to a registered dietician. States that she is not routinely exercising, but walks 5 miles daily at her job in [facility] [employer] [facility] [employer].  -Referral placed to dietician.  -Encourage healthy diet and nutrition and adequate intake of fruits and vegetables.  -Encourage routine exercise.

## 2022-10-18 ENCOUNTER — DOCUMENTATION (OUTPATIENT)
Dept: VASCULAR LAB | Facility: MEDICAL CENTER | Age: 57
End: 2022-10-18
Payer: COMMERCIAL

## 2022-10-18 NOTE — PROGRESS NOTES
Left message for pt to call back to scheduled urgent hospital f/u with Dr. Hoskins. 10/27/22 @4:20 available for this pt.

## 2022-10-18 NOTE — DOCUMENTATION QUERY
"                                                                         [facility]                                                                       Query Response Note      PATIENT:               NADEEM STEWARD  ACCT #:                  [id number]  MRN:                     [id number]  :                      1965  ADMIT DATE:       10/7/2022 3:27 PM  DISCH DATE:        10/10/2022 2:53 PM  RESPONDING  PROVIDER #:        [id number]           QUERY TEXT:    Please provide additional clinical indicators supportive of your documented diagnosis of acute hypoxic respiratory failure.    Note: If an appropriate response is not listed below, please respond with a new note.    If you have any questions please contact:  Asia Sparks RN CDI [facility]  [email or web address]  Asia [email or web address] Via [email or web address]      The patient's Clinical Indicators include:  57 year old male admitted with     The diagnosis of acute hypoxic respiratory failure is documented in the medical record. The patient with a history nocturnal hypoxia. He has received a maximum of 4L O2 via NC (per RN flowsheet) since arrival. Patients sats were 93% on 2L O2 and then 95% on 4L O2. Is acute hypoxic respiratory failure an accurate diagnosis for this admission?    10/10 D/C Summary Nghia \"Acute respiratory failure with hypoxia\"  10/09 Nghia \"Acute respiratory failure with hypoxia; Likely secondary to pulmonary edema\"  \"Nocturnal hypoxia\" & \"Explained to the patient since she is hypoxic at nighttime while sleeping she may have sleep apnea\"    Risk factors: obesity, HTN, nocturnal hypoxia  Treatment: O2, labs, ECHO  Options provided:   -- No acute respiratory failure is not a valid diagnosis during this admission   -- Yes acute respiratory failure is a valid diagnosis during this admission   -- Other explanation, please specify      Query created by: Asia Sparks on 10/11/2022 5:03 PM    RESPONSE TEXT:    Other explanation- Yes a valid " diagnosis as noted by Dr. Jovita Vazquez          Electronically signed by:  CRYSTAL FERNÁNDEZ MD 10/18/2022 11:13 AM

## 2022-10-27 ENCOUNTER — OFFICE VISIT (OUTPATIENT)
Dept: VASCULAR LAB | Facility: MEDICAL CENTER | Age: 57
End: 2022-10-27
Attending: FAMILY MEDICINE
Payer: COMMERCIAL

## 2022-10-27 VITALS
HEIGHT: 69 IN | WEIGHT: 292.6 LBS | HEART RATE: 79 BPM | DIASTOLIC BLOOD PRESSURE: 84 MMHG | SYSTOLIC BLOOD PRESSURE: 117 MMHG | BODY MASS INDEX: 43.34 KG/M2

## 2022-10-27 DIAGNOSIS — I61.9 HEMORRHAGIC CEREBROVASCULAR ACCIDENT (CVA) (HCC): ICD-10-CM

## 2022-10-27 DIAGNOSIS — E66.01 MORBID OBESITY WITH BMI OF 40.0-44.9, ADULT (HCC): ICD-10-CM

## 2022-10-27 DIAGNOSIS — R06.83 SNORING: ICD-10-CM

## 2022-10-27 DIAGNOSIS — E78.00 PURE HYPERCHOLESTEROLEMIA: ICD-10-CM

## 2022-10-27 DIAGNOSIS — I61.9 CEREBROVASCULAR SMALL VESSEL DISEASE WITH HEMORRHAGE (HCC): ICD-10-CM

## 2022-10-27 DIAGNOSIS — I10 ESSENTIAL HYPERTENSION: ICD-10-CM

## 2022-10-27 DIAGNOSIS — R73.03 PREDIABETES: ICD-10-CM

## 2022-10-27 PROCEDURE — 99204 OFFICE O/P NEW MOD 45 MIN: CPT | Performed by: FAMILY MEDICINE

## 2022-10-27 PROCEDURE — 99212 OFFICE O/P EST SF 10 MIN: CPT

## 2022-10-27 ASSESSMENT — ENCOUNTER SYMPTOMS
WEAKNESS: 0
FEVER: 0
PALPITATIONS: 0
BRUISES/BLEEDS EASILY: 0
CHILLS: 0
MYALGIAS: 0
ORTHOPNEA: 0
COUGH: 0
CLAUDICATION: 0
NAUSEA: 0

## 2022-10-27 ASSESSMENT — FIBROSIS 4 INDEX: FIB4 SCORE: 1.07

## 2022-10-27 NOTE — PROGRESS NOTES
RESISTANT HTN CLINIC - INITIAL EVALUATION   10/27/22    Brielle Krishnamurthy is a female seen for evaluation of resistant HTN and management.   Brielle Krishnamurthy is initially referred by Fletcher Souza    Subjective    HTN:  Current/interval HTN concerns:  hx of recent pontine hemorrhage earlier this mo, had n/v, imbalance, HA  currently no sx and feeling well.    Home BP lo-120s/70s on [employer]   Adherence to current HTN meds: compliant all of the time   Lifestyle factors:  Weight Change: stable   BMI Readings from Last 3 Encounters:   10/27/22 43.21 kg/m²   10/13/22 43.53 kg/m²   10/08/22 43.63 kg/m²      Diet pattern: common adult  Daily salt intake estimate:  Low     EtOH: No, stopped 2 yrs ago   Exercise habits: minimal exercise  Perceived barriers to care: none   Pertinent HTN hx (reviewed at initial visit):  Age at HTN dx: >5yrs   (if female, any hx of pregnancy-related HTN): gest HTN  Past HTN medications: as noted   HTN crises:  10/2022 - ED  Interfering substances/contributing factors:  NSAIDs/Cortes-2i: Yes, Details: currently     Hyperlipidemia:  Stable, no current concerns  Current treatment: lifestyle changes  and High intensity    Latest Reference Range & Units 10/7/22 16:28   Cholesterol,Tot 100 - 199 mg/dL 287 (H)   Triglycerides 0 - 149 mg/dL 103   HDL >=40 mg/dL 57   LDL <100 mg/dL 209 (H)     Antiplatelet/anticoagulation: No    PreDM:  Lab Results   Component Value Date    HBA1C 5.9 (H) 10/07/2022   no prior dx of T2D or meds, no sx       CKD: No     Sleeping disorder/TRENTON: Yes, Details: works night, shift work issues, heavy snoring.     Hypothyroidism: Yes, Details: stable, no meds       Patient Active Problem List    Diagnosis Date Noted    Cerebrovascular small vessel disease with hemorrhage (HCC) 10/27/2022    Pontine hemorrhage, hypoxia, HTN urgency, dyslipidemia (HCC) 10/08/2022    Acute respiratory failure with hypoxia (HCC) 10/08/2022    Hemorrhagic cerebrovascular accident  (CVA) (Former[facility]) 10/07/2022    Essential hypertension 04/20/2018    Morbid obesity with BMI of 40.0-44.9, adult ([facility]) 07/31/2017    Pure hypercholesterolemia 03/13/2016    Intramural leiomyoma of uterus 06/16/2014    History of chlamydia infection 11/27/2012    Postoperative hypothyroidism     Cervical radiculopathy 07/25/2012     Past Surgical History:   Procedure Laterality Date    VAGINAL HYSTERECTOMY SCOPE TOTAL  6/16/2014    Performed by Kelechi Ojeda M.D. at SURGERY [facility]    THYROIDECTOMY TOTAL  8/3/2011    Performed by REBEKA FORD at SURGERY SAME DAY [facility]    SUBMANDIBLE ABSCESS INCISION AND DRAINAGE  7/6/2011    Performed by REBEKA HAYS at SURGERY [facility]    DENTAL EXTRACTION(S)  7/6/2011    Performed by REBEKA HAYS at SURGERY [facility]       Current Outpatient Medications:     lisinopril, 20 mg, Oral, DAILY (Patient taking differently: 10 mg, Oral, DAILY), Taking Differently    amLODIPine, 10 mg, Oral, DAILY, Taking    atorvastatin, 40 mg, Oral, DAILY, Taking    Multiple Vitamins-Minerals (ZINC PO), 1 Tablet, Oral, QAM, Taking    SELENIUM PO, 1 Tablet, Oral, QAM, Taking    Ascorbic Acid (VITAMIN C PO), 1 Tablet, Oral, Q EVENING, Taking    levothyroxine, 175 mcg, Oral, AM ES, Taking    Magnesium, 400 mg, Oral, QAM, Taking    Vitamin D-3, 2 Tablet, Oral, Q EVENING, Taking   Patient has no known allergies.    Family History   Problem Relation Age of Onset    Hypertension Mother     Cancer Other     Heart Disease Other     Stroke Other      Social History     Tobacco Use    Smoking status: Never    Smokeless tobacco: Never    Tobacco comments:     occasional social smoker   Substance Use Topics    Alcohol use: Yes     Comment: occasionally    Drug use: No      Review of Systems   Constitutional:  Negative for chills and fever.   Respiratory:  Negative for cough.    Cardiovascular:  Negative for chest pain, palpitations, orthopnea, claudication and leg swelling.  "  Gastrointestinal:  Negative for nausea.   Musculoskeletal:  Negative for myalgias.   Neurological:  Negative for weakness.   Endo/Heme/Allergies:  Does not bruise/bleed easily.         Objective    Vitals:    10/27/22 1603 10/27/22 1606   BP: 137/88 117/84   BP Location: Left arm Left arm   Patient Position: Sitting Sitting   BP Cuff Size: Large adult Large adult   Pulse: 79 79   Weight: (!) 133 kg (292 lb 9.6 oz)    Height: 1.753 m (5' 9\")      Body mass index is 43.21 kg/m².  BP Readings from Last 5 Encounters:   10/27/22 117/84   10/13/22 (!) 145/105   10/10/22 (!) 152/105   10/07/22 (!) 191/107   12/30/21 (!) 180/120      Physical Exam  Vitals reviewed.   Constitutional:       General: She is not in acute distress.     Appearance: Normal appearance.   HENT:      Head: Normocephalic and atraumatic.   Neck:      Thyroid: No thyroid mass.      Vascular: Normal carotid pulses.      Trachea: Trachea normal.   Cardiovascular:      Rate and Rhythm: Normal rate and regular rhythm.      Chest Wall: PMI is not displaced.      Pulses: Normal pulses.           Carotid pulses are 2+ on the right side and 2+ on the left side.       Radial pulses are 2+ on the right side and 2+ on the left side.        Dorsalis pedis pulses are 2+ on the right side and 2+ on the left side.        Posterior tibial pulses are 2+ on the right side and 2+ on the left side.      Heart sounds: Normal heart sounds.   Pulmonary:      Effort: Pulmonary effort is normal.      Breath sounds: Normal breath sounds.   Musculoskeletal:      Cervical back: Full passive range of motion without pain.      Right lower leg: No edema.      Left lower leg: No edema.   Skin:     General: Skin is warm and dry.      Capillary Refill: Capillary refill takes less than 2 seconds.      Coloration: Skin is not cyanotic.      Nails: There is no clubbing.   Neurological:      General: No focal deficit present.      Mental Status: She is alert and oriented to person, place, " and time. Mental status is at baseline.      Cranial Nerves: No cranial nerve deficit.      Coordination: Coordination is intact. Coordination normal.      Gait: Gait is intact. Gait normal.   Psychiatric:         Mood and Affect: Mood normal.         Behavior: Behavior normal.      Accessory Clinical Findings:     Lab Results   Component Value Date    CHOLSTRLTOT 287 (H) 10/07/2022     (H) 10/07/2022    HDL 57 10/07/2022    TRIGLYCERIDE 103 10/07/2022      No results found for: LIPOPROTA   No results found for: APOB    Lab Results   Component Value Date    PROTHROMBTM 14.3 10/07/2022    INR 1.16 (H) 10/07/2022       Lab Results   Component Value Date    HBA1C 5.9 (H) 10/07/2022      Lab Results   Component Value Date    SODIUM 141 10/07/2022    POTASSIUM 4.1 10/07/2022    CHLORIDE 103 10/07/2022    CO2 24 10/07/2022    GLUCOSE 163 (H) 10/07/2022    BUN 13 10/07/2022    CREATININE 0.86 10/07/2022             No results found for: MICROALBCALC, MALBCRT, MALBEXCR, WOCIGE38, MICROALBUR, MICRALB, UMICROALBUM, MICROALBTIM   VASCULAR IMAGING:     Last EKG:   Results for orders placed or performed during the hospital encounter of 10/07/22   EKG (NOW)   Result Value Ref Range    Report       [facility] Emergency Dept.    Test Date:  2022-10-07  Pt Name:    NADEEM STEWARD               Department: [facility]  MRN:        [id number]                      Room:       -ROOM 3  Gender:     Female                       Technician: [id number]  :        1965                   Requested By:ER TRIAGE PROTOCOL  Order #:    [id number]                    Reading MD: Sang SERRATO MD    Measurements  Intervals                                Axis  Rate:       71                           P:          38  NC:         156                          QRS:        -13  QRSD:       148                          T:          5  QT:         473  QTc:        515    Interpretive Statements  Rate of 71.  Normal NC  borderline QRS.  No ST segment elevations or  depressions  abnormal no acute ischemic findings  Electronically Signed On 10-7-2022 14:11:11 PDT by Sang SERRATO MD           Echo 10/2022  Normal left ventricular systolic function.  Mild concentric left ventricular hypertrophy.  No significant valvular disease.    CT head 10/2022  1.  Punctate intraparenchymal hemorrhage in the dorsal nicola slightly RIGHT of midline.  2.  Mild diffuse atrophy and white matter microvascular ischemic changes.    MRI 10/2022  1.  There is an approximately 1 cm sized subacute hemorrhage in the right paramedian nicola with associated developmental venous anomaly.  2.  Mild cerebral volume loss.  3.  Moderate chronic microvascular ischemic disease.    CTA neck 10/2022  1.  No focal stenosis, dissection or occlusion of the cervical carotid or vertebral arteries.  2.  Hyperdense nodule overlying the thyroid cartilage, likely ectopic thyroid tissue.  Thyroid neoplasm is difficult to entirely exclude.    CTA head 10/2022   No intracranial aneurysm, focal stenosis, or abrupt large vessel cut off.            MEDICAL DECISION-MAKING:  TODAY'S ASSESSMENT / STATUS / PLAN:  1. Essential hypertension  Referral to Pulmonary and Sleep Medicine    Referral to 24-Hour Blood Pressure Monitoring    Comp Metabolic Panel    Lipid Profile    Lipoprotein (a)    CBC WITHOUT DIFFERENTIAL    MICROALBUMIN CREAT RATIO URINE      2. Hemorrhagic cerebrovascular accident (CVA) (HCC)        3. Pure hypercholesterolemia  Comp Metabolic Panel    Lipid Profile    Lipoprotein (a)      4. Morbid obesity with BMI of 40.0-44.9, adult (HCC)        5. Cerebrovascular small vessel disease with hemorrhage (HCC)        6. Snoring  Referral to Pulmonary and Sleep Medicine         Patient Type: Primary Prevention, possibly secondary in light of MRI findings     1. BLOOD PRESSURE CONTROL   Qualifies as Resistant HTN (RH):  No, BP at goal with <3 meds   Office BP Goal ACC/AHA  (2017) goal <130/80  Home BP at goal:  yes  Office BP at goal:  yes - WCE noted   24h ABPM:  not ordered to date   Device candidate? Not available at this time   Contributing factors: probable TRENTON, BMI 43   Strong indications of chronic, uncontrolled HTN with early onset   - has no other clinical clues to secondary causes - stable K+, nl renal function, so no advanced testing at this time in light of excellent response to BP med strategies by PCP  Plan:   Monitoring:   - start/continue home BP monitoring, reviewed correct technique:  Yes   - order 24h ABPM: YES - specifically looking for nondipping status - clue to TRENTON   - monitor lytes/gfr routinely   - advised that stabilizing BP may require multiple med changes and close monitoring over the next several months, reviewed that initially there will be additional imaging and labs and the possibility of adverse drug rxn's and variability of his BP and HR until we have things stabilized.  Advised to contact our office as needed for questions or concerns.      2. WORK UP FOR SECONDARY CAUSES OF RH:  Obstructive Sleep Apnea:  ref to  sleep MD, high risk phenotype and sx  Renal parenchymal disease: Excluded  Renovascular HTN: Not Yet Assessed  Primary Aldosteronism: Not Yet Assessed  Thyroid Function:  known hypothyroidism on meds - stable, continue meds     Pheochromocytoma: Not Yet Assessed - unlikely  Cushing's:   Not Yet Assessed - unlikely   Coarctation of Aorta: excluded  Other: none identified    Recommendations At This Visit: as noted in orders         3. MEDICATION USE / ADHERENCE:   Assessment: Complete  Recommendations: Instructed to Continue Taking All Medications As Prescribed         4. HTN-MEDIATED END-ORGAN DAMAGE:  Left Ventricular Hypertrophy: Present on  Echocardiogram Date: 2022  Urine Albuminuria:  no prot on UA  on Date: 2022  Renal Function: G2  Ophthalmologic: Absent per patient report and/or eye specialist   Established Cardiovascular Disease:  Present: yes    # pontine hemorrhagic CVA,  associated CeSVD per MRI - 10/2022 - stable   - presumed HTN-mediated along with venous anomaly as defined on MRI   - continue med mgmt   - f/u with neurology pending   - ED for acute worsening or new neuro s/s    5. LIFESTYLE INTERVENTIONS:    SMOKING:   reports that she has never smoked. She has never used smokeless tobacco.   - continued complete avoidance of all tobacco products     PHYSICAL ACTIVITY: continue healthy activity to improve CV fitness.  In general, targeting >150min/week of moderate-level activity or as much as tolerated in light of functional status and co-morbidities     WEIGHT MANAGEMENT AND NUTRITION: DASH style dietary approach , Focus on reduced sodium to <2,000mg per day , and Provide specific dietary approach handouts    6. STANDARD HTN PHARMACOTHERAPY:  - increase lisinopril to 20mg daily (BP dosing)  - continue amlodipine 10mg QHS   Thiazide Type Diuretic: add as next agent     7. 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 no  Plan:   - reinforced ongoing TLC measures as noted   - monitor labs, consider HNP FH genotyping at next visit   Meds:   - continue atorva 40mg daily   - add zetia as next agent     GLYCEMIA MANAGEMENT:  Prediabetic  Lab Results   Component Value Date    HBA1C 5.9 (H) 10/07/2022      Plan:  - continue healthy diet, weight reduction, daily physical activity   - consider metformin up to 850mg BID to slow or offset progression to  T2D as per DPP trial   - monitor labs Q6-12mo     ANTIPLATELET/ANTICOAGULANT THERAPY:  consider ASA 81mg daily after stabilized CVA    OTHER ISSUES:     # hypothyroidism, stable  Tolerating meds, TSH normal   - continue current treatment plan, defer mgmt to PCP      # headaches - resolved.    prior BP-related, continue to monitor       Studies Ordered At This Visit: 24h ABPM, ref for PSG   Blood Work to Be Obtained Prior to Next Visit: as noted below   Disposition: RTC in 2 months      Chase Hoskins M.D.  [facility]   [facility]   [phone]

## 2022-11-02 ENCOUNTER — TELEPHONE (OUTPATIENT)
Dept: MEDICAL GROUP | Facility: PHYSICIAN GROUP | Age: 57
End: 2022-11-02
Payer: COMMERCIAL

## 2022-11-02 NOTE — TELEPHONE ENCOUNTER
VOICEMAIL  1. Caller Name: Brielle Krishnamurthy                      Call Back Number: [phone] (home)     2. Message: Pt called asking if she could drop off paperwork for FMLA to be completed.  Asked for a cb.

## 2022-11-02 NOTE — TELEPHONE ENCOUNTER
Phone Number Called: [phone] (home)     Call outcome: Left detailed message for patient. Informed to call back with any additional questions.    Message: LVM for Pt to bring in paperwork for her appt w/ PCP on 11/03/22 @1020.

## 2022-11-03 ENCOUNTER — OFFICE VISIT (OUTPATIENT)
Dept: MEDICAL GROUP | Facility: PHYSICIAN GROUP | Age: 57
End: 2022-11-03
Payer: COMMERCIAL

## 2022-11-03 VITALS
SYSTOLIC BLOOD PRESSURE: 122 MMHG | WEIGHT: 288 LBS | DIASTOLIC BLOOD PRESSURE: 86 MMHG | HEIGHT: 69 IN | BODY MASS INDEX: 42.65 KG/M2 | HEART RATE: 88 BPM | OXYGEN SATURATION: 98 % | TEMPERATURE: 97.5 F

## 2022-11-03 DIAGNOSIS — I10 ESSENTIAL HYPERTENSION: ICD-10-CM

## 2022-11-03 DIAGNOSIS — Z86.73 HISTORY OF HEMORRHAGIC CEREBROVASCULAR ACCIDENT (CVA) WITHOUT RESIDUAL DEFICITS: ICD-10-CM

## 2022-11-03 DIAGNOSIS — I61.9 CEREBROVASCULAR SMALL VESSEL DISEASE WITH HEMORRHAGE (HCC): ICD-10-CM

## 2022-11-03 PROCEDURE — 99214 OFFICE O/P EST MOD 30 MIN: CPT | Performed by: NURSE PRACTITIONER

## 2022-11-03 ASSESSMENT — ENCOUNTER SYMPTOMS
COUGH: 0
PALPITATIONS: 0
DIZZINESS: 0
FEVER: 0
HEADACHES: 0
DEPRESSION: 0
SHORTNESS OF BREATH: 0
CHILLS: 0
DOUBLE VISION: 0
BLURRED VISION: 0

## 2022-11-03 ASSESSMENT — FIBROSIS 4 INDEX: FIB4 SCORE: 1.07

## 2022-11-03 NOTE — ASSESSMENT & PLAN NOTE
-Continue care with cardiology.  -Continue care with neurology.  -[facility] paperwork completed for work.

## 2022-11-03 NOTE — ASSESSMENT & PLAN NOTE
-Continue care with cardiology.  -Continue taking lisinopril 20 mg PO daily and amlodipine 10 mg PO daily.   -Continue monitoring blood pressure at home.  -FMLA paperwork completed for work.

## 2022-11-03 NOTE — NON-PROVIDER
Continuous FMLA leave, has not been into work since the stroke.  FMLA though December, but interested in longer.    Appointments with Dr. Hoskins, monthly. Lisinopril 20 mg PO in the morning and amlodipine 10 mg PO at night. Reports home blood pressures have been 110s-130s/70s-80s. Walking 3 miles every day.     Neurology, monthly. Next appointment scheduled in November. Sleep study for March 30, 2023.

## 2022-11-03 NOTE — PROGRESS NOTES
Subjective:     Chief Complaint   Patient presents with    Paperwork     [place] form        HPI:   Brielle presents today with     Problem   Cerebrovascular Small Vessel Disease With Hemorrhage (Hcc)    Chronic in nature. Controlled. Followed by neurology, Wendi ETIENNE. Next appointment scheduled 12/21/2022.  She denies numbness, tingling, and changes in sensation and neuro status.     History of Hemorrhagic Cerebrovascular Accident (Cva) Without Residual Deficits    Pontine hemorrhage 10/7/2022.  Followed by cardiology, Dr. Hoskins. Followed by neurology, Wendi ETIENNE.      Essential Hypertension    Chronic in nature. Controlled. Followed by Dr. Hoskins, last seen 10/27/2022. Currently taking lisinopril 20 mg PO in the morning and amlodipine 10 mg PO at night. In-office blood pressure today is 122/86. Reports home blood pressures have been 110s-130s/70s-80s. States that she is tolerating medication well. She denies blurred vision, chest pain, dizziness, palpitations, and shortness of breath. Reports walking walking 3 miles every day. Requesting [facility] paperwork for intermittent leave.           Current Outpatient Medications Ordered in Epic   Medication Sig Dispense Refill    VITAMIN K PO Take  by mouth every day.      lisinopril (PRINIVIL) 20 MG Tab Take 1 Tablet by mouth every day. 30 Tablet 0    amLODIPine (NORVASC) 10 MG Tab Take 1 Tablet by mouth every day. 30 Tablet 0    atorvastatin (LIPITOR) 40 MG Tab Take 1 Tablet by mouth every day. 30 Tablet 0    Multiple Vitamins-Minerals (ZINC PO) Take 1 Tablet by mouth every morning.      SELENIUM PO Take 1 Tablet by mouth every morning.      Ascorbic Acid (VITAMIN C PO) Take 1 Tablet by mouth every evening.      levothyroxine (SYNTHROID) 175 MCG Tab Take 1 Tablet by mouth every morning on an empty stomach. BRAND NAME SYNTHROID 90 Tablet 0    Magnesium 400 MG CAPS Take 400 mg by mouth every morning.      Cholecalciferol (VITAMIN D-3) 5000 UNITS TABS Take 2  "Tablets by mouth every evening.       No current Epic-ordered facility-administered medications on file.       Health Maintenance: Completed    Review of Systems   Constitutional:  Negative for chills, fever and malaise/fatigue.   Eyes:  Negative for blurred vision and double vision.   Respiratory:  Negative for cough and shortness of breath.    Cardiovascular:  Negative for chest pain, palpitations and leg swelling.   Neurological:  Negative for dizziness and headaches.   Psychiatric/Behavioral:  Negative for depression and suicidal ideas.    All other systems reviewed and are negative.      Objective:     Exam:  /86 (BP Location: Left arm, Patient Position: Sitting, BP Cuff Size: Large adult)   Pulse 88   Temp 36.4 °C (97.5 °F) (Temporal)   Ht 1.753 m (5' 9\") Comment: pt reported  Wt (!) 131 kg (288 lb)   LMP 06/10/2014   SpO2 98%   BMI 42.53 kg/m²  Body mass index is 42.53 kg/m².    Physical Exam  Constitutional:       Appearance: Normal appearance.   HENT:      Head: Normocephalic.   Eyes:      Pupils: Pupils are equal, round, and reactive to light.   Cardiovascular:      Rate and Rhythm: Normal rate and regular rhythm.      Pulses: Normal pulses.      Heart sounds: Normal heart sounds.   Pulmonary:      Effort: Pulmonary effort is normal.      Breath sounds: Normal breath sounds.   Musculoskeletal:         General: Normal range of motion.   Skin:     General: Skin is warm and dry.      Capillary Refill: Capillary refill takes less than 2 seconds.   Neurological:      General: No focal deficit present.      Mental Status: She is alert and oriented to person, place, and time.   Psychiatric:         Mood and Affect: Mood normal.     Assessment & Plan:     57 y.o. female with the following -     Problem List Items Addressed This Visit       Cerebrovascular small vessel disease with hemorrhage (HCC)     -Continue care with neurology.  -[place] completed for work.         Essential hypertension    "  -Continue care with cardiology.  -Continue taking lisinopril 20 mg PO daily and amlodipine 10 mg PO daily.   -Continue monitoring blood pressure at home.  -[facility] paperwork completed for work.           History of hemorrhagic cerebrovascular accident (CVA) without residual deficits     -Continue care with cardiology.  -Continue care with neurology.  -[facility] paperwork completed for work.            Return in about 1 month (around 12/3/2022), or if symptoms worsen or fail to improve, for HTN.    Please note that this dictation was created using voice recognition software. I have made every reasonable attempt to correct obvious errors, but I expect that there are errors of grammar and possibly content that I did not discover before finalizing the note.

## 2022-11-07 ENCOUNTER — TELEPHONE (OUTPATIENT)
Dept: MEDICAL GROUP | Facility: PHYSICIAN GROUP | Age: 57
End: 2022-11-07
Payer: COMMERCIAL

## 2022-11-07 NOTE — TELEPHONE ENCOUNTER
Please contact patient and find out if we just need to check this box additionally or if we need to complete a separate form for the continuous leave.

## 2022-11-07 NOTE — TELEPHONE ENCOUNTER
"VOICEMAIL  1. Caller Name: Brielle Krishnamurthy                        Call Back Number: [phone] (home)       2. Message: Pt left message asking provider to check \"CONTINUOUS LEAVE\" on her [place] paperwok.    3. Patient approves office to leave a detailed voicemail/MyChart message: N\A  "

## 2022-11-08 ENCOUNTER — TELEPHONE (OUTPATIENT)
Dept: MEDICAL GROUP | Facility: PHYSICIAN GROUP | Age: 57
End: 2022-11-08
Payer: COMMERCIAL

## 2022-11-08 DIAGNOSIS — E78.00 PURE HYPERCHOLESTEROLEMIA: ICD-10-CM

## 2022-11-08 DIAGNOSIS — I10 ESSENTIAL HYPERTENSION: ICD-10-CM

## 2022-11-08 RX ORDER — ATORVASTATIN CALCIUM 40 MG/1
40 TABLET, FILM COATED ORAL DAILY
Qty: 90 TABLET | Refills: 0 | Status: SHIPPED | OUTPATIENT
Start: 2022-11-08 | End: 2023-01-30

## 2022-11-08 RX ORDER — AMLODIPINE BESYLATE 10 MG/1
10 TABLET ORAL DAILY
Qty: 90 TABLET | Refills: 0 | Status: SHIPPED | OUTPATIENT
Start: 2022-11-08 | End: 2023-01-30

## 2022-11-08 NOTE — TELEPHONE ENCOUNTER
.Caller Name: Brielle Krishnamurthy    Call Back Number: [phone] (home)       How would the patient prefer to be contacted with a response: Phone call OK to leave a detailed message    Pt needs Fletcher to extend the continuous end date. Pt needs to talk to Dr. Hoskins (Vascular Surgeon) first to know when she is able to return to work. Waiting for pt to let us know what end date she needs on her [facility] paperwork.

## 2022-11-09 ENCOUNTER — APPOINTMENT (OUTPATIENT)
Dept: VASCULAR LAB | Facility: MEDICAL CENTER | Age: 57
End: 2022-11-09
Attending: FAMILY MEDICINE
Payer: COMMERCIAL

## 2022-11-10 ENCOUNTER — NON-PROVIDER VISIT (OUTPATIENT)
Dept: VASCULAR LAB | Facility: MEDICAL CENTER | Age: 57
End: 2022-11-10
Attending: FAMILY MEDICINE
Payer: COMMERCIAL

## 2022-11-10 DIAGNOSIS — I10 ESSENTIAL HYPERTENSION: ICD-10-CM

## 2022-11-10 PROCEDURE — 93788 AMBL BP MNTR W/SW A/R: CPT

## 2022-11-10 PROCEDURE — 93786 AMBL BP MNTR W/SW REC ONLY: CPT

## 2022-11-10 PROCEDURE — 93790 AMBL BP MNTR W/SW I&R: CPT | Performed by: INTERNAL MEDICINE

## 2022-11-15 NOTE — TELEPHONE ENCOUNTER
Phone Number Called: [phone] (home)     Call outcome: Spoke to patient regarding message below.    Message: Called pt to f/up. Pt states she returned to work on Wednesday, November 9, 2022 to avoid filling out any additional paperwork. Pt apologized for not updating us sooner and had no further questions.

## 2022-11-15 NOTE — PROGRESS NOTES
Ambulatory blood pressure monitor results reviewed  Full report under media tab  Reasonable data acquisition    Mean daytime: 111/71 consistent with well-controlled out of office blood pressure    Nocturnal dip: Appears well-maintained    Clinical correlation needed.  Will discuss with patient at follow-up visit    Michael Bloch, MD  V[facility] Care

## 2022-11-18 ENCOUNTER — DOCUMENTATION (OUTPATIENT)
Dept: VASCULAR LAB | Facility: MEDICAL CENTER | Age: 57
End: 2022-11-18
Payer: COMMERCIAL

## 2022-11-18 ENCOUNTER — TELEPHONE (OUTPATIENT)
Dept: VASCULAR LAB | Facility: MEDICAL CENTER | Age: 57
End: 2022-11-18
Payer: COMMERCIAL

## 2022-11-18 NOTE — PROGRESS NOTES
Pt called and left VM stating that left upper thigh gets numb to the  touch when walking occasionally and she wanted to know if that was something we should be concerned about.     Message routed to vascular med providers.

## 2022-11-18 NOTE — TELEPHONE ENCOUNTER
Called and left VM with the below information. Pt to call back if they have any questions.     ----- Message from VICTORINA Martinez sent at 11/18/2022  9:59 AM PST -----  I dont think it is a problem unless she is having unilateral weakness.  She has an appt with her PCP in early Dec so she should discuss then as well.  Cj Vigil   ----- Message -----  From: Randy Cooper, Med Ass't  Sent: 11/18/2022   8:33 AM PST  To: Vascular Medicine Providers    Pt called and left VM stating that left upper thigh gets numb to the  touch when walking occasionally and she wanted to know if that was something we should be concerned about. Please advise.

## 2022-11-28 DIAGNOSIS — I10 ESSENTIAL HYPERTENSION: ICD-10-CM

## 2022-11-28 RX ORDER — LISINOPRIL 20 MG/1
20 TABLET ORAL DAILY
Qty: 30 TABLET | Refills: 0 | Status: SHIPPED | OUTPATIENT
Start: 2022-11-28 | End: 2022-12-21 | Stop reason: SINTOL

## 2022-12-05 ENCOUNTER — OFFICE VISIT (OUTPATIENT)
Dept: MEDICAL GROUP | Facility: PHYSICIAN GROUP | Age: 57
End: 2022-12-05
Payer: COMMERCIAL

## 2022-12-05 VITALS
WEIGHT: 288 LBS | HEIGHT: 69 IN | SYSTOLIC BLOOD PRESSURE: 118 MMHG | BODY MASS INDEX: 42.65 KG/M2 | TEMPERATURE: 98.6 F | DIASTOLIC BLOOD PRESSURE: 78 MMHG | OXYGEN SATURATION: 96 % | HEART RATE: 72 BPM

## 2022-12-05 DIAGNOSIS — G57.12 MERALGIA PARESTHETICA, LEFT: ICD-10-CM

## 2022-12-05 DIAGNOSIS — I10 ESSENTIAL HYPERTENSION: ICD-10-CM

## 2022-12-05 DIAGNOSIS — R73.03 PREDIABETES: ICD-10-CM

## 2022-12-05 DIAGNOSIS — F41.9 ANXIETY: ICD-10-CM

## 2022-12-05 PROCEDURE — 99214 OFFICE O/P EST MOD 30 MIN: CPT | Performed by: NURSE PRACTITIONER

## 2022-12-05 ASSESSMENT — ENCOUNTER SYMPTOMS
DIZZINESS: 0
DEPRESSION: 0
WHEEZING: 0
CHILLS: 0
FEVER: 0
NAUSEA: 0
MYALGIAS: 0
COUGH: 0
VOMITING: 0
HEARTBURN: 0
SHORTNESS OF BREATH: 0

## 2022-12-05 ASSESSMENT — FIBROSIS 4 INDEX: FIB4 SCORE: 1.07

## 2022-12-05 NOTE — ASSESSMENT & PLAN NOTE
- We will monitor   -- DO NOT REPLY / DO NOT REPLY ALL --  -- Message is from the [facility]--    General Patient Message      Reason for Call: Patient is returning a call from Dr. Case regarding to her medication but the line got cut off and will like the provider to give the patient another call back for consultation.     Caller Information       Type Contact Phone    12/31/2019 12:46 PM Phone (Incoming) Bev Funez (Self) [phone] (M)          Alternative phone number: none     Turnaround time given to caller:   \"This message will be sent to [state Provider's name]. The clinical team will fulfill your request as soon as they review your message.\"}

## 2022-12-05 NOTE — PROGRESS NOTES
Subjective:     Chief Complaint   Patient presents with    Hypertension Follow-up     Per pt, diastolic has been in the 90's    Follow-Up     Anxiety - per pt, has been better       HPI:   Brielle presents today with     Problem   Anxiety    Chronic in nature. States that is is controlled.  States she has not recently had specific symptoms.  Denies any chest pain, palpitations, dizziness, blurry vision.     Meralgia Paresthetica, Left    This is a new issue to this provider.  Patient has noticed it over the last couple of weeks.  States that it is just a numb sensation in her left outer thigh.  States that she does not wear any belts and states that she will notice it when she is walking or sitting for long periods of time.  States that massaging it or moving her leg will make the sensation come back.  We discussed meralgia paresthetica and its overall benign nature.  Discussed symptoms to look for discussed that if it changes or getting worse we might consider imaging.  We did discuss that it is sometimes related to weight and discussed that exercise, changes in diet may help with improvement.     Prediabetes    Chronic in nature.  Last A1c was completed 10/7/2022, 5.9.  Plan at this time will be to repeat A1c at follow-up appointment.     Essential Hypertension    Chronic in nature. Controlled. BP today is 118/78. States she is having difficulty with staying the whole week. Currently working 3 days per week and working on 5. Followed by Dr. Hoskins, follow-up 12/23. Currently taking lisinopril 20 mg PO in the morning and amlodipine 10 mg PO at night. Reports home blood pressures have been ok, will see diastolic in 90's sometimes. States that she is tolerating medication well. She denies blurred vision, chest pain, dizziness, palpitations, and shortness of breath. Reports walking walking 3 miles every day.         Current Outpatient Medications Ordered in Epic   Medication Sig Dispense Refill    Coenzyme Q10  "(COQ10 PO) Take  by mouth every day.      lisinopril (PRINIVIL) 20 MG Tab TAKE 1 TABLET BY MOUTH EVERY DAY 30 Tablet 0    atorvastatin (LIPITOR) 40 MG Tab Take 1 Tablet by mouth every day. 90 Tablet 0    amLODIPine (NORVASC) 10 MG Tab Take 1 Tablet by mouth every day. 90 Tablet 0    VITAMIN K PO Take  by mouth every day.      Multiple Vitamins-Minerals (ZINC PO) Take 1 Tablet by mouth every morning.      SELENIUM PO Take 1 Tablet by mouth every morning.      Ascorbic Acid (VITAMIN C PO) Take 1 Tablet by mouth every evening.      levothyroxine (SYNTHROID) 175 MCG Tab Take 1 Tablet by mouth every morning on an empty stomach. BRAND NAME SYNTHROID 90 Tablet 0    Magnesium 400 MG CAPS Take 400 mg by mouth every morning.      Cholecalciferol (VITAMIN D-3) 5000 UNITS TABS Take 2 Tablets by mouth every evening.       No current Epic-ordered facility-administered medications on file.       Health Maintenance: Completed    Review of Systems   Constitutional:  Negative for chills, fever and malaise/fatigue.   Respiratory:  Negative for cough, shortness of breath and wheezing.    Cardiovascular:  Negative for chest pain and leg swelling.   Gastrointestinal:  Negative for heartburn, nausea and vomiting.   Genitourinary:  Negative for dysuria.   Musculoskeletal:  Negative for myalgias.   Neurological:  Negative for dizziness.   Psychiatric/Behavioral:  Negative for depression and suicidal ideas.        Objective:     Exam:  /78 (BP Location: Left arm, Patient Position: Sitting)   Pulse 72   Temp 37 °C (98.6 °F) (Temporal)   Ht 1.753 m (5' 9\") Comment: pt reported  Wt (!) 131 kg (288 lb)   LMP 06/10/2014   SpO2 96%   BMI 42.53 kg/m²  Body mass index is 42.53 kg/m².    Physical Exam  Constitutional:       Appearance: Normal appearance.   HENT:      Head: Normocephalic and atraumatic.   Cardiovascular:      Rate and Rhythm: Normal rate and regular rhythm.      Pulses: Normal pulses.      Heart sounds: Normal heart " sounds.   Pulmonary:      Effort: Pulmonary effort is normal.      Breath sounds: Normal breath sounds.   Abdominal:      General: Abdomen is flat.   Skin:     General: Skin is warm and dry.      Capillary Refill: Capillary refill takes less than 2 seconds.   Neurological:      General: No focal deficit present.      Mental Status: She is alert and oriented to person, place, and time.     Assessment & Plan:     57 y.o. female with the following -     Problem List Items Addressed This Visit       Anxiety     - We will monitor         Essential hypertension     - Continue lisinopril 20 mg by mouth daily, amlodipine 10 mg by mouth daily.         Meralgia paresthetica, left     - We will monitor         Prediabetes     - A1c in March.            Return in about 3 months (around 3/5/2023) for HTN, meralgia paresthetica.    I have placed the below orders and discussed them with an approved delegating provider. The MA is performing the below orders under the direction of Dr. Medrano.     Please note that this dictation was created using voice recognition software. I have made every reasonable attempt to correct obvious errors, but I expect that there are errors of grammar and possibly content that I did not discover before finalizing the note.

## 2022-12-21 ENCOUNTER — TELEPHONE (OUTPATIENT)
Dept: NEUROLOGY | Facility: MEDICAL CENTER | Age: 57
End: 2022-12-21
Payer: COMMERCIAL

## 2022-12-21 ENCOUNTER — OFFICE VISIT (OUTPATIENT)
Dept: NEUROLOGY | Facility: MEDICAL CENTER | Age: 57
End: 2022-12-21
Attending: NURSE PRACTITIONER
Payer: COMMERCIAL

## 2022-12-21 VITALS
DIASTOLIC BLOOD PRESSURE: 76 MMHG | WEIGHT: 277.56 LBS | HEART RATE: 78 BPM | SYSTOLIC BLOOD PRESSURE: 132 MMHG | BODY MASS INDEX: 43.56 KG/M2 | HEIGHT: 67 IN | OXYGEN SATURATION: 98 % | TEMPERATURE: 96.3 F

## 2022-12-21 DIAGNOSIS — S06.310D INTRAPARENCHYMAL HEMATOMA OF BRAIN, RIGHT, WITHOUT LOSS OF CONSCIOUSNESS, SUBSEQUENT ENCOUNTER: ICD-10-CM

## 2022-12-21 DIAGNOSIS — I10 ESSENTIAL HYPERTENSION: ICD-10-CM

## 2022-12-21 DIAGNOSIS — E78.2 MIXED HYPERLIPIDEMIA: ICD-10-CM

## 2022-12-21 PROBLEM — S06.33AA INTRAPARENCHYMAL HEMATOMA OF BRAIN (HCC): Status: ACTIVE | Noted: 2022-12-21

## 2022-12-21 PROCEDURE — 99212 OFFICE O/P EST SF 10 MIN: CPT | Performed by: NURSE PRACTITIONER

## 2022-12-21 PROCEDURE — 99215 OFFICE O/P EST HI 40 MIN: CPT | Performed by: NURSE PRACTITIONER

## 2022-12-21 RX ORDER — LEVOTHYROXINE SODIUM 0.2 MG/1
TABLET ORAL
COMMUNITY
End: 2022-12-21

## 2022-12-21 RX ORDER — LIOTHYRONINE SODIUM 5 UG/1
TABLET ORAL
COMMUNITY
End: 2022-12-21

## 2022-12-21 RX ORDER — LOSARTAN POTASSIUM 100 MG/1
100 TABLET ORAL DAILY
Qty: 30 TABLET | Refills: 2 | Status: SHIPPED | OUTPATIENT
Start: 2022-12-21 | End: 2023-05-02 | Stop reason: SDUPTHER

## 2022-12-21 RX ORDER — BUPROPION HYDROCHLORIDE 150 MG/1
300 TABLET, EXTENDED RELEASE ORAL
COMMUNITY
End: 2022-12-21

## 2022-12-21 ASSESSMENT — ENCOUNTER SYMPTOMS
MYALGIAS: 1
DOUBLE VISION: 0
FALLS: 0
VOMITING: 0
FOCAL WEAKNESS: 0
HEADACHES: 0
NAUSEA: 0
SHORTNESS OF BREATH: 0
COUGH: 1
BLURRED VISION: 0
DIZZINESS: 0
PALPITATIONS: 0
DEPRESSION: 0
WEAKNESS: 0
SENSORY CHANGE: 1
FEVER: 0
NERVOUS/ANXIOUS: 1

## 2022-12-21 ASSESSMENT — FIBROSIS 4 INDEX: FIB4 SCORE: 1.07

## 2022-12-21 NOTE — PROGRESS NOTES
Subjective         HPI    Brielle Lancaster is a 57 y.o.  right handed female who presents to [facility] for evaluation of right pontine IPH.  She presented to [facility] on  10/7/2022 with complaints of sudden onset of dizziness. She had not been taking her Lisinopril because it was not working.  Blood pressure on admission 212/143.  CT showed right pontine hemorrhage and she was transferred to [facility].       She is here alone today, she still feels tired and doesn't feel that her recall is the greatest.  She feels that her walking is little different, she can't walk as fast as she used to. She is working, she works on a concrete floor and her hips and legs have been hurting a lot. She has an occasional numb spot on the left thigh that she can rub out. She is on Lisinopril and has been coughing. She checks her blood pressure at home, it has been 116-132/70-90.      Review of Systems   Constitutional:  Positive for malaise/fatigue. Negative for fever.   HENT:  Negative for nosebleeds.    Eyes:  Negative for blurred vision and double vision.   Respiratory:  Positive for cough. Negative for shortness of breath.         Dry cough with Lisinopril   Cardiovascular:  Negative for chest pain and palpitations.   Gastrointestinal:  Negative for nausea and vomiting.   Musculoskeletal:  Positive for joint pain and myalgias. Negative for falls.   Neurological:  Positive for sensory change. Negative for dizziness, focal weakness, weakness and headaches.   Psychiatric/Behavioral:  Negative for depression. The patient is nervous/anxious.           Current Outpatient Medications on File Prior to Visit   Medication Sig Dispense Refill    Coenzyme Q10 (COQ10 PO) Take  by mouth every day.      lisinopril (PRINIVIL) 20 MG Tab TAKE 1 TABLET BY MOUTH EVERY DAY 30 Tablet 0    atorvastatin (LIPITOR) 40 MG Tab Take 1 Tablet by mouth every day. 90 Tablet 0    amLODIPine (NORVASC) 10 MG Tab Take 1 Tablet by  mouth every day. 90 Tablet 0    VITAMIN K PO Take  by mouth every day.      Multiple Vitamins-Minerals (ZINC PO) Take 1 Tablet by mouth every morning.      SELENIUM PO Take 1 Tablet by mouth every morning.      Ascorbic Acid (VITAMIN C PO) Take 1 Tablet by mouth every evening.      levothyroxine (SYNTHROID) 175 MCG Tab Take 1 Tablet by mouth every morning on an empty stomach. BRAND NAME SYNTHROID 90 Tablet 0    Magnesium 400 MG CAPS Take 400 mg by mouth every morning.      Cholecalciferol (VITAMIN D-3) 5000 UNITS TABS Take 2 Tablets by mouth every evening.       No current facility-administered medications on file prior to visit.         Past Medical History:   Diagnosis Date    Anemia     Clostridium difficile infection 2011    HTN (hypertension) 07-29-11    has been on Lisinopril, off at this time    Hypothyroidism, postsurgical 2011    benign thyroid nodules; thyroidectomy    Other specified disorder of intestines     constipation    Other specified symptom associated with female genital organs         Social History     Socioeconomic History    Marital status:      Spouse name: Not on file    Number of children: Not on file    Years of education: Not on file    Highest education level: Not on file   Occupational History    Not on file   Tobacco Use    Smoking status: Never    Smokeless tobacco: Never    Tobacco comments:     occasional social smoker   Vaping Use    Vaping Use: Never used   Substance and Sexual Activity    Alcohol use: Not Currently    Drug use: No    Sexual activity: Yes     Partners: Male     Birth control/protection: Surgical     Comment: Pt had a hysterectomy   Other Topics Concern    Not on file   Social History Narrative    Not on file     Social Determinants of Health     Financial Resource Strain: Not on file   Food Insecurity: Not on file   Transportation Needs: Not on file   Physical Activity: Not on file   Stress: Not on file   Social Connections: Not on file   Intimate Partner  "Violence: Not on file   Housing Stability: Not on file           Objective     I personally reviewed imaging below and agree with the findings  CT Head 10/7/2022  Punctate intraparenchymal hemorrhage in the dorsal nicola slightly RIGHT of midline.  Mild diffuse atrophy and white matter microvascular ischemic changes.  CTA Neck 10/7/2022  1.  No focal stenosis, dissection or occlusion of the cervical carotid or vertebral arteries.  2.  Hyperdense nodule overlying the thyroid cartilage, likely ectopic thyroid tissue.  Thyroid neoplasm is difficult to entirely exclude.  CTA Head 10/7/2022  No intracranial aneurysm, focal stenosis, or abrupt large vessel cut off.  CT Cerebral perfusion 10/7/2022  1.  Cerebral blood flow less than 30% likely representing completed infarct = 0 mL.     2.  T Max more than 6 seconds likely representing combination of completed infarct and ischemia = 0 mL.     3.  Mismatched volume likely representing ischemic brain/penumbra = None     4.  Please note that the cerebral perfusion was performed on the limited brain tissue around the basal ganglia region. Infarct/ischemia outside the CT perfusion sections can be missed in this study.  MRI Brain 10/7/2022   There is an approximately 1 cm sized subacute hemorrhage in the right paramedian nicola with associated developmental venous anomaly.  Mild cerebral volume loss.  Moderate chronic microvascular ischemic disease.  TTE 10/10/2022  LVEF 60/%, mild concentric LVH, LA size WNL, GAL 27     /76 (BP Location: Right arm, Patient Position: Sitting, BP Cuff Size: Adult)   Pulse 78   Temp (!) 35.7 °C (96.3 °F) (Temporal)   Ht 1.702 m (5' 7\")   Wt (!) 126 kg (277 lb 9 oz)   LMP 06/10/2014   SpO2 98%   BMI 43.47 kg/m²          PHYSICAL ASSESSMENT  Constitutional:  Alert, no apparent distress,  Psych:   mood and affect WNL  Muskuloskeletal:  Moves all extremities equally, strength 5/5  BUE/BLE flexors/extensors, no drift  NEUROLOGICAL " ASSESSMENT  Oriented X 4, speech fluent, naming and memory intact  CN II: Visual fields are full to confrontation. Fundoscopic exam is normal with sharp discs and no vascular changes. Pupils are 4 mm and briskly reactive to light.   CN III: IV, VI  EOMs intact, no ptosis  CN V: Facial sensation is intact to pinprick in all 3 divisions bilaterally. Corneal responses are intact.  CN VII: Face is symmetric with normal eye closure and smile.  CN VIII Hearing is normal to rubbing fingers  CN IX, X: Palate elevates symmetrically. Phonation is normal.  CN XI: Head turning and shoulder shrug are intact  CN XII: Tongue is midline with normal movements and no atrophy.                           Sensation to PP equal bilaterally                 No limb ataxia with finger to nose and heel to shin                 Ambulates with steady gait.                 Rhomberg negative                Biceps,brachioradialis, tricep, and patellar reflexes all 2+     Cardiovascular:    S1S2, no abnormal rhythm auscultated, no peripheral edema  Neck:                     No carotid bruits noted   Pulmonary:            Respirations easy, lungs clear to auscultation all fields.     Skin:                     No obvious rashes.        Iniital NIHSS 0      Current NIHSS    1a. LOC: 0  1b. LOC Questions: 0  1c. LOC Commands: 0  2. Best Gaze:0  3. Visual Fields: 0  4. Facial Paresis: 0  5a. Motor arm left: 0  5b. Motor arm right: 0  6a. Motor leg left: 0  6b. Motor leg right: 0  7. Sensory: 0  8. Best Language: 0  9. Limb Ataxia: 0  10. Dysarthria: 0  11. Extinction/Inattention: 0    Total Score Current 0          Current mRS 1               Assessment & Plan       1. 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         Stroke risk factors:  HTN, HLD      2.  Essential hypertension      Blood pressure goal less than 130/80, current 132/76    3. Mixed hyperlipidemia    LDL goal less than 70, current 209, continue Atorvastatin 40mg.  Exercise at least 30 minutes daily, avoid red meat, fried foods, butter, cheese.   Eat 5-6 servings of vegetables and fruits daily, choose lean white meat without skin (chicken, turkey, white fish)--baked, broiled or grilled.        If any new signs of stroke:  sudden weakness, numbness, speech difficulty (slurring or difficulty finding words), imbalance, incoordination, worse headache of life or vision loss occur, call 911.      Take all medications as prescribed unless instructed by your provider.        I spent a total of 40 minutes caring for patient,  my time includes counseling, review of systems, HPI and assessment, review of images, labs and testing as above.  I reviewed the hospital records, PMH, social and family history.   I have counseled patient on stroke prevention strategies, stroke symptoms and mimics.  Diet and exercise modifications.  We discussed medication side effects and instructions.         Follow up with Primary Care

## 2022-12-21 NOTE — TELEPHONE ENCOUNTER
NEUROLOGY PATIENT PRE-VISIT PLANNING     Patient was NOT contacted to complete PVP.  Note: Patient will not be contacted if there is no indication to call.     Patient Appointment is scheduled as: New Patient     Is visit type and length scheduled correctly? Yes    [facility] Patient is checked in Patient Demographics? Yes    3.   Is referral attached to visit? Yes    4. Were records received from referring provider? Yes, referring provider is a [facility] provider so records are in Epic.     4. Patient was NOT contacted to have someone accompany them to visit.     5. Is this appointment scheduled as a Hospital Follow-Up?  Yes    6. Does the patient require any pre procedure or post procedure follow up? No    7. If any orders were placed at last visit or intended to be done for this visit do we have Results/Consult Notes? Yes  Labs -  Recent labs are in [facility].   Imaging - Recent images are in Epic.   Referrals - No referrals were ordered at last office visit.    8. If patient appointment is for Botox - is order pended for provider? N/A

## 2022-12-21 NOTE — PATIENT INSTRUCTIONS
If any new signs of stroke:  sudden weakness, numbness, speech difficulty (slurring or difficulty finding words), imbalance, incoordination, worse headache of life or vision loss occur, call 911.      Take all medications as prescribed unless instructed by your provider.    Blood pressure goal less than 130/80, start Losartan 100mg, stop Lisinopril     LDL (bad cholesterol) goal less than 70, current 209.  Continue Atorvastatin 40mg,   Exercise at least 30 minutes daily, avoid red meat, fried foods, butter, cheese.   Eat 5-6 servings of vegetables and fruits daily, choose lean white meat without skin (chicken, turkey, white fish)--baked, broiled or grilled.

## 2022-12-23 ENCOUNTER — APPOINTMENT (OUTPATIENT)
Dept: VASCULAR LAB | Facility: MEDICAL CENTER | Age: 57
End: 2022-12-23
Payer: COMMERCIAL

## 2022-12-28 DIAGNOSIS — S06.310D INTRAPARENCHYMAL HEMATOMA OF BRAIN, RIGHT, WITHOUT LOSS OF CONSCIOUSNESS, SUBSEQUENT ENCOUNTER: ICD-10-CM

## 2023-01-27 DIAGNOSIS — E78.00 PURE HYPERCHOLESTEROLEMIA: ICD-10-CM

## 2023-01-27 DIAGNOSIS — I10 ESSENTIAL HYPERTENSION: ICD-10-CM

## 2023-01-30 RX ORDER — ATORVASTATIN CALCIUM 40 MG/1
40 TABLET, FILM COATED ORAL DAILY
Qty: 90 TABLET | Refills: 0 | Status: SHIPPED | OUTPATIENT
Start: 2023-01-30 | End: 2023-05-02 | Stop reason: SDUPTHER

## 2023-01-30 RX ORDER — AMLODIPINE BESYLATE 10 MG/1
10 TABLET ORAL DAILY
Qty: 90 TABLET | Refills: 0 | Status: SHIPPED | OUTPATIENT
Start: 2023-01-30 | End: 2023-05-02 | Stop reason: SDUPTHER

## 2023-03-30 ENCOUNTER — APPOINTMENT (OUTPATIENT)
Dept: SLEEP MEDICINE | Facility: MEDICAL CENTER | Age: 58
End: 2023-03-30
Attending: PREVENTIVE MEDICINE
Payer: COMMERCIAL

## 2023-05-02 DIAGNOSIS — I10 ESSENTIAL HYPERTENSION: ICD-10-CM

## 2023-05-02 DIAGNOSIS — E78.00 PURE HYPERCHOLESTEROLEMIA: ICD-10-CM

## 2023-05-02 RX ORDER — LOSARTAN POTASSIUM 100 MG/1
100 TABLET ORAL DAILY
Qty: 90 TABLET | Refills: 3 | Status: SHIPPED | OUTPATIENT
Start: 2023-05-02 | End: 2024-05-01

## 2023-05-02 RX ORDER — AMLODIPINE BESYLATE 10 MG/1
10 TABLET ORAL DAILY
Qty: 90 TABLET | Refills: 0 | Status: SHIPPED | OUTPATIENT
Start: 2023-05-02 | End: 2023-07-31

## 2023-05-02 RX ORDER — ATORVASTATIN CALCIUM 40 MG/1
40 TABLET, FILM COATED ORAL DAILY
Qty: 90 TABLET | Refills: 0 | Status: SHIPPED | OUTPATIENT
Start: 2023-05-02 | End: 2023-07-31

## 2023-05-02 NOTE — TELEPHONE ENCOUNTER
VOICEMAIL  1. Caller Name: Brielle Krishnamurthy                        Call Back Number: [phone] (home)       2. Message: Patient left  stating that she was unable to refill her Losartan as her Neuro Wendi Mejia switched her to this from Lisinopril as Lisinopril  caused a cough. Wendi Mejia APRN stated that pt would have to receive refills through PCP but she would prescribe it due to reaction. Patient is requesting a refill of this medication.     3. Patient approves office to leave a detailed voicemail/MyChart message: N\A

## 2023-07-31 DIAGNOSIS — E78.00 PURE HYPERCHOLESTEROLEMIA: ICD-10-CM

## 2023-07-31 DIAGNOSIS — I10 ESSENTIAL HYPERTENSION: ICD-10-CM

## 2023-07-31 RX ORDER — ATORVASTATIN CALCIUM 40 MG/1
40 TABLET, FILM COATED ORAL DAILY
Qty: 90 TABLET | Refills: 0 | Status: SHIPPED | OUTPATIENT
Start: 2023-07-31

## 2023-07-31 RX ORDER — AMLODIPINE BESYLATE 10 MG/1
10 TABLET ORAL DAILY
Qty: 90 TABLET | Refills: 0 | Status: SHIPPED | OUTPATIENT
Start: 2023-07-31

## 2024-09-30 ENCOUNTER — APPOINTMENT (OUTPATIENT)
Dept: MEDICAL GROUP | Facility: PHYSICIAN GROUP | Age: 59
End: 2024-09-30
Payer: COMMERCIAL

## 2024-10-01 ENCOUNTER — APPOINTMENT (OUTPATIENT)
Dept: MEDICAL GROUP | Facility: PHYSICIAN GROUP | Age: 59
End: 2024-10-01
Payer: COMMERCIAL

## 2024-10-15 ENCOUNTER — OFFICE VISIT (OUTPATIENT)
Dept: MEDICAL GROUP | Facility: PHYSICIAN GROUP | Age: 59
End: 2024-10-15
Payer: COMMERCIAL

## 2024-10-15 VITALS
OXYGEN SATURATION: 96 % | WEIGHT: 293 LBS | HEART RATE: 78 BPM | SYSTOLIC BLOOD PRESSURE: 134 MMHG | BODY MASS INDEX: 43.4 KG/M2 | TEMPERATURE: 97.1 F | HEIGHT: 69 IN | DIASTOLIC BLOOD PRESSURE: 76 MMHG

## 2024-10-15 DIAGNOSIS — R22.1 NECK MASS: ICD-10-CM

## 2024-10-15 DIAGNOSIS — Z86.73 HISTORY OF HEMORRHAGIC CEREBROVASCULAR ACCIDENT (CVA) WITHOUT RESIDUAL DEFICITS: ICD-10-CM

## 2024-10-15 DIAGNOSIS — I61.3 PONTINE HEMORRHAGE (HCC): ICD-10-CM

## 2024-10-15 DIAGNOSIS — Z12.11 SCREENING FOR COLON CANCER: ICD-10-CM

## 2024-10-15 DIAGNOSIS — Z00.00 WELLNESS EXAMINATION: ICD-10-CM

## 2024-10-15 DIAGNOSIS — E78.2 MIXED HYPERLIPIDEMIA: ICD-10-CM

## 2024-10-15 DIAGNOSIS — R53.83 OTHER FATIGUE: ICD-10-CM

## 2024-10-15 DIAGNOSIS — E66.01 MORBID OBESITY WITH BMI OF 40.0-44.9, ADULT (HCC): ICD-10-CM

## 2024-10-15 DIAGNOSIS — R73.03 PREDIABETES: ICD-10-CM

## 2024-10-15 DIAGNOSIS — E89.0 POSTOPERATIVE HYPOTHYROIDISM: ICD-10-CM

## 2024-10-15 DIAGNOSIS — Z12.31 ENCOUNTER FOR SCREENING MAMMOGRAM FOR MALIGNANT NEOPLASM OF BREAST: ICD-10-CM

## 2024-10-15 DIAGNOSIS — N95.1 SYMPTOMS, SUCH AS FLUSHING, SLEEPLESSNESS, HEADACHE, LACK OF CONCENTRATION, ASSOCIATED WITH THE MENOPAUSE: ICD-10-CM

## 2024-10-15 DIAGNOSIS — Z23 NEED FOR VACCINATION: ICD-10-CM

## 2024-10-15 DIAGNOSIS — Z11.59 NEED FOR HEPATITIS C SCREENING TEST: ICD-10-CM

## 2024-10-15 DIAGNOSIS — I10 ESSENTIAL HYPERTENSION: ICD-10-CM

## 2024-10-15 PROBLEM — J96.01 ACUTE RESPIRATORY FAILURE WITH HYPOXIA (HCC): Status: RESOLVED | Noted: 2022-10-08 | Resolved: 2024-10-15

## 2024-10-15 PROBLEM — F41.9 ANXIETY: Status: RESOLVED | Noted: 2022-12-05 | Resolved: 2024-10-15

## 2024-10-15 PROCEDURE — 99214 OFFICE O/P EST MOD 30 MIN: CPT | Mod: 25 | Performed by: NURSE PRACTITIONER

## 2024-10-15 PROCEDURE — 3078F DIAST BP <80 MM HG: CPT | Performed by: NURSE PRACTITIONER

## 2024-10-15 PROCEDURE — 3075F SYST BP GE 130 - 139MM HG: CPT | Performed by: NURSE PRACTITIONER

## 2024-10-15 PROCEDURE — 90471 IMMUNIZATION ADMIN: CPT | Performed by: NURSE PRACTITIONER

## 2024-10-15 PROCEDURE — 90715 TDAP VACCINE 7 YRS/> IM: CPT | Performed by: NURSE PRACTITIONER

## 2024-10-15 ASSESSMENT — PATIENT HEALTH QUESTIONNAIRE - PHQ9: CLINICAL INTERPRETATION OF PHQ2 SCORE: 0

## 2024-11-13 ENCOUNTER — HOSPITAL ENCOUNTER (OUTPATIENT)
Dept: LAB | Facility: MEDICAL CENTER | Age: 59
End: 2024-11-13
Attending: NURSE PRACTITIONER
Payer: COMMERCIAL

## 2024-11-13 DIAGNOSIS — N95.1 SYMPTOMS, SUCH AS FLUSHING, SLEEPLESSNESS, HEADACHE, LACK OF CONCENTRATION, ASSOCIATED WITH THE MENOPAUSE: ICD-10-CM

## 2024-11-13 DIAGNOSIS — Z11.59 NEED FOR HEPATITIS C SCREENING TEST: ICD-10-CM

## 2024-11-13 DIAGNOSIS — Z00.00 WELLNESS EXAMINATION: ICD-10-CM

## 2024-11-13 DIAGNOSIS — R53.83 OTHER FATIGUE: ICD-10-CM

## 2024-11-13 LAB
BASOPHILS # BLD AUTO: 1.4 % (ref 0–1.8)
BASOPHILS # BLD: 0.07 K/UL (ref 0–0.12)
EOSINOPHIL # BLD AUTO: 0.13 K/UL (ref 0–0.51)
EOSINOPHIL NFR BLD: 2.5 % (ref 0–6.9)
ERYTHROCYTE [DISTWIDTH] IN BLOOD BY AUTOMATED COUNT: 45 FL (ref 35.9–50)
HCT VFR BLD AUTO: 44.4 % (ref 37–47)
HGB BLD-MCNC: 13.9 G/DL (ref 12–16)
IMM GRANULOCYTES # BLD AUTO: 0.03 K/UL (ref 0–0.11)
IMM GRANULOCYTES NFR BLD AUTO: 0.6 % (ref 0–0.9)
LYMPHOCYTES # BLD AUTO: 1.53 K/UL (ref 1–4.8)
LYMPHOCYTES NFR BLD: 29.7 % (ref 22–41)
MCH RBC QN AUTO: 26 PG (ref 27–33)
MCHC RBC AUTO-ENTMCNC: 31.3 G/DL (ref 32.2–35.5)
MCV RBC AUTO: 83 FL (ref 81.4–97.8)
MONOCYTES # BLD AUTO: 0.31 K/UL (ref 0–0.85)
MONOCYTES NFR BLD AUTO: 6 % (ref 0–13.4)
NEUTROPHILS # BLD AUTO: 3.09 K/UL (ref 1.82–7.42)
NEUTROPHILS NFR BLD: 59.8 % (ref 44–72)
NRBC # BLD AUTO: 0 K/UL
NRBC BLD-RTO: 0 /100 WBC (ref 0–0.2)
PLATELET # BLD AUTO: 306 K/UL (ref 164–446)
PMV BLD AUTO: 10.4 FL (ref 9–12.9)
RBC # BLD AUTO: 5.35 M/UL (ref 4.2–5.4)
WBC # BLD AUTO: 5.2 K/UL (ref 4.8–10.8)

## 2024-11-13 PROCEDURE — 85025 COMPLETE CBC W/AUTO DIFF WBC: CPT

## 2024-11-13 PROCEDURE — 84146 ASSAY OF PROLACTIN: CPT

## 2024-11-13 PROCEDURE — 84439 ASSAY OF FREE THYROXINE: CPT

## 2024-11-13 PROCEDURE — 36415 COLL VENOUS BLD VENIPUNCTURE: CPT

## 2024-11-13 PROCEDURE — 84443 ASSAY THYROID STIM HORMONE: CPT

## 2024-11-13 PROCEDURE — 82670 ASSAY OF TOTAL ESTRADIOL: CPT

## 2024-11-13 PROCEDURE — 86803 HEPATITIS C AB TEST: CPT

## 2024-11-13 PROCEDURE — 83002 ASSAY OF GONADOTROPIN (LH): CPT

## 2024-11-13 PROCEDURE — 82306 VITAMIN D 25 HYDROXY: CPT

## 2024-11-13 PROCEDURE — 82607 VITAMIN B-12: CPT

## 2024-11-13 PROCEDURE — 83001 ASSAY OF GONADOTROPIN (FSH): CPT

## 2024-11-13 PROCEDURE — 80053 COMPREHEN METABOLIC PANEL: CPT

## 2024-11-14 DIAGNOSIS — E89.0 HYPOTHYROIDISM, POSTSURGICAL: ICD-10-CM

## 2024-11-14 LAB
25(OH)D3 SERPL-MCNC: 14 NG/ML (ref 30–100)
ALBUMIN SERPL BCP-MCNC: 4.3 G/DL (ref 3.2–4.9)
ALBUMIN/GLOB SERPL: 1.4 G/DL
ALP SERPL-CCNC: 75 U/L (ref 30–99)
ALT SERPL-CCNC: 14 U/L (ref 2–50)
ANION GAP SERPL CALC-SCNC: 9 MMOL/L (ref 7–16)
AST SERPL-CCNC: 22 U/L (ref 12–45)
BILIRUB SERPL-MCNC: 0.4 MG/DL (ref 0.1–1.5)
BUN SERPL-MCNC: 12 MG/DL (ref 8–22)
CALCIUM ALBUM COR SERPL-MCNC: 8.8 MG/DL (ref 8.5–10.5)
CALCIUM SERPL-MCNC: 9 MG/DL (ref 8.5–10.5)
CHLORIDE SERPL-SCNC: 104 MMOL/L (ref 96–112)
CO2 SERPL-SCNC: 27 MMOL/L (ref 20–33)
CREAT SERPL-MCNC: 0.67 MG/DL (ref 0.5–1.4)
ESTRADIOL SERPL-MCNC: 13.7 PG/ML
FSH SERPL-ACNC: 43 MIU/ML
GFR SERPLBLD CREATININE-BSD FMLA CKD-EPI: 101 ML/MIN/1.73 M 2
GLOBULIN SER CALC-MCNC: 3.1 G/DL (ref 1.9–3.5)
GLUCOSE SERPL-MCNC: 97 MG/DL (ref 65–99)
HCV AB SER QL: NORMAL
LH SERPL-ACNC: 42.5 IU/L
POTASSIUM SERPL-SCNC: 4.2 MMOL/L (ref 3.6–5.5)
PROLACTIN SERPL-MCNC: 10.1 NG/ML (ref 2.8–26)
PROT SERPL-MCNC: 7.4 G/DL (ref 6–8.2)
SODIUM SERPL-SCNC: 140 MMOL/L (ref 135–145)
T4 FREE SERPL-MCNC: 0.82 NG/DL (ref 0.93–1.7)
TSH SERPL-ACNC: 20.9 UIU/ML (ref 0.35–5.5)
VIT B12 SERPL-MCNC: 717 PG/ML (ref 211–911)

## 2024-11-14 RX ORDER — LEVOTHYROXINE SODIUM 175 UG/1
175 TABLET ORAL
Qty: 90 TABLET | Refills: 0 | Status: SHIPPED | OUTPATIENT
Start: 2024-11-14

## 2024-12-03 ENCOUNTER — OFFICE VISIT (OUTPATIENT)
Dept: MEDICAL GROUP | Facility: PHYSICIAN GROUP | Age: 59
End: 2024-12-03
Payer: COMMERCIAL

## 2024-12-03 VITALS
SYSTOLIC BLOOD PRESSURE: 138 MMHG | BODY MASS INDEX: 43.4 KG/M2 | DIASTOLIC BLOOD PRESSURE: 66 MMHG | HEART RATE: 74 BPM | OXYGEN SATURATION: 97 % | WEIGHT: 293 LBS | HEIGHT: 69 IN | TEMPERATURE: 97.2 F

## 2024-12-03 DIAGNOSIS — Z71.9 ENCOUNTER FOR CONSULTATION: ICD-10-CM

## 2024-12-03 DIAGNOSIS — Z78.0 POST-MENOPAUSAL: ICD-10-CM

## 2024-12-03 DIAGNOSIS — E89.0 POSTOPERATIVE HYPOTHYROIDISM: ICD-10-CM

## 2024-12-03 DIAGNOSIS — I10 ESSENTIAL HYPERTENSION: ICD-10-CM

## 2024-12-03 PROCEDURE — 3075F SYST BP GE 130 - 139MM HG: CPT | Performed by: NURSE PRACTITIONER

## 2024-12-03 PROCEDURE — 3078F DIAST BP <80 MM HG: CPT | Performed by: NURSE PRACTITIONER

## 2024-12-03 PROCEDURE — 99214 OFFICE O/P EST MOD 30 MIN: CPT | Performed by: NURSE PRACTITIONER

## 2024-12-03 ASSESSMENT — FIBROSIS 4 INDEX: FIB4 SCORE: 1.11

## 2024-12-03 NOTE — PROGRESS NOTES
Verbal consent was acquired by the patient to use "[employer] ambient listening note generation during this visit yes    Subjective:     HPI:   History of Present Illness  The patient presents for evaluation of multiple medical concerns.    Low Vitamin D Levels  She is currently taking vitamin D with K2 to manage her low vitamin D levels.    Thyroid Issues  She is on a daily regimen of levothyroxine 175 mcg, taken on an empty stomach. She has previously tried T3, which improved her TSH levels to 1.2, but discontinued it due to low levels. Following this, she experienced issues with blood pressure and migraines, and subsequently had a stroke. She has consulted with a thyroid specialist, Dr. Caraballo, who prescribed T3 10 mcg twice daily. However, she did not complete the recommended blood work and did not return for a follow-up. She has a history of goiter and has undergone thyroid removal surgery. She reports feeling fatigued and unmotivated, but does not believe she is depressed. She felt better while on T3 and is considering resuming it.  - Medication: Levothyroxine 175 mcg daily on an empty stomach; previously tried T3.  - Character: Issues with blood pressure and migraines following T3 discontinuation; history of goiter and thyroid removal surgery.  - Alleviating/Aggravating Factors: Felt better while on T3; considering resuming T3.  - Severity: Reports feeling fatigued and unmotivated.    Headaches/Hypertension  She has not experienced headaches for a significant period, but recently started having them again. She monitors her blood pressure when she feels a headache coming on. She has noticed that her systolic blood pressure is slightly elevated in the mornings, while her diastolic pressure is within normal range. She tries to maintain her blood pressure below 130 during the day and at work. She takes amlodipine as needed when her blood pressure is high or when she has a headache. She does not believe her blood  "pressure is related to anxiety.  - Onset: Recently started having headaches again.  - Timing: Systolic blood pressure slightly elevated in the mornings; tries to maintain blood pressure below 130 during the day and at work.  - Alleviating/Aggravating Factors: Monitors blood pressure when feeling a headache coming on; takes amlodipine as needed.  - Severity: Does not believe blood pressure is related to anxiety.    Postmenopausal  Was previously on IM supplement which she intends to restart, previously she had had questions regarding her symptoms and how much of them are likely related to hormone changes associated with menopause.  Estrogen would be contraindicated due to history of stroke.    FAMILY HISTORY  Her mother had thyroidectomy. Her father has multiple medical problems. Both of her parents are on blood pressure medications.    Health Maintenance: Completed    Objective:     Exam:  /66 (BP Location: Left arm, Patient Position: Sitting, BP Cuff Size: Large adult)   Pulse 74   Temp 36.2 °C (97.2 °F) (Temporal)   Ht 1.753 m (5' 9\")   Wt (!) 138 kg (303 lb 12.8 oz)   LMP 06/10/2014   SpO2 97%   BMI 44.86 kg/m²  Body mass index is 44.86 kg/m².    Constitutional: Alert, no distress, well-groomed.  Skin: Warm, dry, good turgor, no rashes in visible areas.  Eye: Equal, round and reactive, conjunctiva clear, lids normal.  ENMT: Lips without lesions, good dentition, moist mucous membranes.  Neck: Trachea midline, no masses, no thyromegaly.  Respiratory: Unlabored respiratory effort, no cough.  MSK: Normal gait, moves all extremities.  Neuro: Grossly non-focal.   Psych: Alert and oriented x3, normal affect and mood.    Results  Laboratory Studies  MCHC was a little bit low. Iron, hemoglobin, hematocrit look good. Red blood cells are the right size. Electrolytes look perfect. Glucose was 97. BUN and creatinine look great. GFR is excellent at 101. Calcium within the normal limits and the corrected calcium " too. Liver enzymes, AST, ALT, alkaline phosphatase, and bilirubin look great. Proteins look good. Vitamin D is very low. Vitamin B12 is 717. TSH was very elevated. T4 was not high enough.    Assessment & Plan:     1. Postoperative hypothyroidism  Referral to Endocrinology      2. Encounter for consultation  E-consult to Endocrinology      3. Essential hypertension        4. Post-menopausal            Assessment & Plan    1. Hypothyroidism - TSH significantly elevated, T4 not sufficiently high despite taking 175 mcg of Synthroid on an empty stomach. Thyroid function not adequately controlled.  - Consult to Dr. Melton in endocrinology for further evaluation and management  - Repeat thyroid labs in 6 to 8 weeks  - Possible start on liothyronine (Cytomel) or Tirosint based on recommendation      3. Hypertension - Blood pressure readings within acceptable limits today. Occasional spikes in systolic blood pressure, uses amlodipine as needed for headaches or elevated readings.  - Continue monitoring blood pressure  -Continue amlodipine 10 mg by mouth    Follow-up  - Return in 3 months for follow-up          Return in about 3 months (around 3/3/2025), or if symptoms worsen or fail to improve, for HTN.    I have placed the below orders and discussed them with an approved delegating provider. The MA is performing the below orders under the direction of Dr. Centeno.      Please note that this dictation was created using voice recognition software. I have made every reasonable attempt to correct obvious errors, but I expect that there are errors of grammar and possibly content that I did not discover before finalizing the note.

## 2024-12-04 ENCOUNTER — E-CONSULT (OUTPATIENT)
Dept: ENDOCRINOLOGY | Facility: MEDICAL CENTER | Age: 59
End: 2024-12-04
Payer: COMMERCIAL

## 2024-12-04 DIAGNOSIS — Z71.9 ENCOUNTER FOR CONSULTATION: ICD-10-CM

## 2024-12-04 PROCEDURE — 99449 NTRPROF PH1/NTRNET/EHR 31/>: CPT | Performed by: INTERNAL MEDICINE

## 2024-12-04 NOTE — PROGRESS NOTES
E-Consult Response     After careful review of the patient's information available in the medical record, the following are my findings and recommendations:    Reason for consult: Recommendations for patient with hypothyroidism    Summary of data reviewed: In summary this is a 59-year-old female born on August 23, 1965 with uncontrolled primary hypothyroidism    She is obese with a BMI of 44.86 kg/m² and weight of 303 pounds or 138 kg.      Her last TSH was elevated at 20 November 2024 while taking Synthroid 175 mcg daily Free T4 was also low at 0.82      Patient reports that she was on Cytomel in the past and felt better on this medication        Recommendations: Potentially the patient just needs a higher dose of Synthroid based on her weight in kilograms the calculated dose would be approximately 220 mcg    If she does have malabsorption issues switching her to Tirosint would be the best choice    The problem with Tirosint is the cost and prior authorization with insurance but if her insurance will approve it I am okay with switching her to Tirosint      However she would have to take 225 mcg daily    And that would mean she would have to take 2 prescriptions 200+25 once a day      With respect to taking Cytomel twice a day monotherapy with T3 or Cytomel is not recommended by the American [employer] Association as  high doses of Cytomel can lead to heart palpitations and tachyarrhythmia and overall it is less safe    We only give Cytomel in combination with Synthroid or levothyroxine at a proper proportion that is physiologic or mimicking normal human production        E-Consult Time: 31 minutes were spent with >50% of the total time spent discussing plan of care with requesting physician (Use code [id number])    Jimmy Melton M.D.

## 2024-12-05 DIAGNOSIS — E89.0 POSTOPERATIVE HYPOTHYROIDISM: ICD-10-CM

## 2024-12-05 RX ORDER — LEVOTHYROXINE SODIUM 50 UG/1
50 TABLET ORAL
Qty: 90 TABLET | Refills: 0 | Status: SHIPPED | OUTPATIENT
Start: 2024-12-05 | End: 2025-03-05

## 2024-12-13 ENCOUNTER — HOSPITAL ENCOUNTER (OUTPATIENT)
Dept: RADIOLOGY | Facility: MEDICAL CENTER | Age: 59
End: 2024-12-13
Attending: NURSE PRACTITIONER
Payer: COMMERCIAL

## 2024-12-13 DIAGNOSIS — R22.1 NECK MASS: ICD-10-CM

## 2024-12-13 PROCEDURE — 76536 US EXAM OF HEAD AND NECK: CPT

## 2024-12-16 ENCOUNTER — TELEPHONE (OUTPATIENT)
Dept: MEDICAL GROUP | Facility: PHYSICIAN GROUP | Age: 59
End: 2024-12-16
Payer: COMMERCIAL

## 2024-12-16 DIAGNOSIS — R22.1 NECK MASS: ICD-10-CM

## 2025-01-09 ENCOUNTER — HOSPITAL ENCOUNTER (OUTPATIENT)
Dept: HEMATOLOGY ONCOLOGY | Facility: MEDICAL CENTER | Age: 60
End: 2025-01-09
Attending: NURSE PRACTITIONER
Payer: COMMERCIAL

## 2025-01-09 VITALS
HEIGHT: 69 IN | DIASTOLIC BLOOD PRESSURE: 108 MMHG | TEMPERATURE: 97.8 F | WEIGHT: 293 LBS | OXYGEN SATURATION: 98 % | SYSTOLIC BLOOD PRESSURE: 156 MMHG | BODY MASS INDEX: 43.4 KG/M2 | RESPIRATION RATE: 14 BRPM | HEART RATE: 76 BPM

## 2025-01-09 DIAGNOSIS — R22.1 NECK MASS: ICD-10-CM

## 2025-01-09 PROCEDURE — 99204 OFFICE O/P NEW MOD 45 MIN: CPT | Performed by: NURSE PRACTITIONER

## 2025-01-09 PROCEDURE — 99212 OFFICE O/P EST SF 10 MIN: CPT | Performed by: NURSE PRACTITIONER

## 2025-01-09 RX ORDER — LOSARTAN POTASSIUM 50 MG/1
50 TABLET ORAL DAILY
COMMUNITY

## 2025-01-09 ASSESSMENT — ENCOUNTER SYMPTOMS
DIZZINESS: 1
DIAPHORESIS: 0
DEPRESSION: 0
MYALGIAS: 0
DIARRHEA: 1
WEIGHT LOSS: 0
CHILLS: 0
FEVER: 0
PALPITATIONS: 0
HEADACHES: 0
CONSTIPATION: 0
COUGH: 1
INSOMNIA: 0
NAUSEA: 0
SORE THROAT: 0
SHORTNESS OF BREATH: 0
NERVOUS/ANXIOUS: 0
VOMITING: 0

## 2025-01-09 ASSESSMENT — FIBROSIS 4 INDEX: FIB4 SCORE: 1.11

## 2025-01-09 ASSESSMENT — PAIN SCALES - GENERAL: PAINLEVEL_OUTOF10: NO PAIN

## 2025-01-09 NOTE — PROGRESS NOTES
Subjective     Brielle Krishnamurthy is a 59 y.o. female who presents with New Patient (IOC/ Neck mass/ Fletcher Olvera)          HPI    Patient referred to me, Intake Oncology Coordinator by her PCP JAIMIE Saldaña for lymphadenopathy.  Patient is unaccompanied for today's visit.    Patient stated approximately October 2024 she had noticed a lump on the left side of her neck.  She contacted her PCP who was able to get her in for an ultrasound.  Ultrasound of the neck on 12/13/2024 showed an echogenic hypervascular mass in the left neck that may represent a single lobulated mass or 2 adjacent masses.  This was measured at 1.7 x 2.9 x 1.3 cm in size.  I did personally view the imaging report images in detail, and reviewed the report in detail with the patient today.    Patient stated that she feels like it might be just a little bit smaller but it is still present.  She denies any dysphagia.  She does have fatigue but equates this to her hypothyroidism in which she is trying to get her thyroid under better control.  She has noticed a cough which she describes as a hoarse cough, no sputum production.  At this time she is not feeling all to well and her abdomen is having some diarrhea at times.  She also has some dizziness at times, likely related to her elevated blood pressure.  Patient's blood pressure was quite elevated in the clinic today.  She stated that she is not taking her blood pressure every day but only taking it as needed.  She states that she starts to have headaches and is when she knows she needs to take her blood pressure medication.  She denies any headaches at this time.    Please see past medical and surgical history below.    Patient is a never smoker.    Patient does have a family history of cancer in her sister who had rhabdomyosarcoma at the age of 21  Aunt who has passed away from this disease.      No Known Allergies    Current Outpatient Medications on File Prior to  Encounter   Medication Sig Dispense Refill    losartan (COZAAR) 50 MG Tab Take 50 mg by mouth every day.      levothyroxine (SYNTHROID) 50 MCG Tab Take 1 Tablet by mouth every morning on an empty stomach for 90 days. Total dose 225 mcg 90 Tablet 0    amLODIPine (NORVASC) 10 MG Tab TAKE 1 TABLET BY MOUTH EVERY DAY 90 Tablet 0    Coenzyme Q10 (COQ10 PO) Take  by mouth every day.      VITAMIN K PO Take  by mouth every day.      Multiple Vitamins-Minerals (ZINC PO) Take 1 Tablet by mouth every morning.      SELENIUM PO Take 1 Tablet by mouth every morning.      Ascorbic Acid (VITAMIN C PO) Take 1 Tablet by mouth every evening.      Magnesium 400 MG CAPS Take 400 mg by mouth every morning.      Cholecalciferol (VITAMIN D-3) 5000 UNITS TABS Take 2 Tablets by mouth every evening.       No current facility-administered medications on file prior to encounter.       Past Medical History:   Diagnosis Date    Anemia     Clostridium difficile infection 01/01/2011    HTN (hypertension) 07/29/2011    has been on Lisinopril, off at this time    Hypothyroidism, postsurgical 01/01/2011    benign thyroid nodules; thyroidectomy    Other specified disorder of intestines     constipation    Other specified symptom associated with female genital organs     Stroke (HCC) 2022    hemorrhagic stroke       Past Surgical History:   Procedure Laterality Date    VAGINAL HYSTERECTOMY SCOPE TOTAL  6/16/2014    Performed by Kelechi Ojeda M.D. at SURGERY [facility] ORS    THYROIDECTOMY TOTAL  8/3/2011    Performed by REBEKA FORD at SURGERY SAME DAY [facility] ORS    SUBMANDIBLE ABSCESS INCISION AND DRAINAGE  7/6/2011    Performed by REBEKA HAYS at SURGERY [facility] ORS    DENTAL EXTRACTION(S)  7/6/2011    Performed by REBEKA HAYS at SURGERY [facility] ORS       Family History   Problem Relation Age of Onset    Hyperlipidemia Mother     Hypertension Mother     Hyperlipidemia Father     Hypertension Father     Cancer Sister 21         "Rhabdomyosarcoma    Heart Disease Other     Stroke Other        Social History     Socioeconomic History    Marital status:    Tobacco Use    Smoking status: Never    Smokeless tobacco: Never    Tobacco comments:     occasional social smoker   Vaping Use    Vaping status: Never Used   Substance and Sexual Activity    Alcohol use: Not Currently     Comment: Last in 2022 - since her stroke    Drug use: No    Sexual activity: Yes     Partners: Male     Birth control/protection: Surgical     Comment: Pt had a hysterectomy           Review of Systems   Constitutional:  Positive for malaise/fatigue (likely d/t thyroid). Negative for chills, diaphoresis, fever and weight loss.   HENT:  Negative for congestion and sore throat.    Respiratory:  Positive for cough (notes a \"hoarse\" cough). Negative for shortness of breath.    Cardiovascular:  Negative for chest pain and palpitations.   Gastrointestinal:  Positive for diarrhea. Negative for constipation, nausea and vomiting.        Does not feel well in her abdomen - diarrhea at times   Genitourinary:  Negative for dysuria.   Musculoskeletal:  Negative for myalgias.   Skin:  Negative for itching and rash.   Neurological:  Positive for dizziness (likely d/t elevated blood pressure). Negative for headaches.   Psychiatric/Behavioral:  Negative for depression. The patient is not nervous/anxious and does not have insomnia.               Objective     BP (!) 172/110 (BP Location: Left arm, Patient Position: Sitting, BP Cuff Size: Adult) Comment: has not taken BP meds today  Pulse 76   Temp 36.6 °C (97.8 °F) (Temporal)   Resp 14   Ht 1.753 m (5' 9\")   Wt (!) 137 kg (301 lb 0.6 oz)   LMP 06/10/2014   SpO2 98%   BMI 44.46 kg/m²      Physical Exam  Vitals reviewed.   Constitutional:       General: She is not in acute distress.     Appearance: Normal appearance. She is not diaphoretic.   HENT:      Head: Normocephalic and atraumatic.   Cardiovascular:      Rate and Rhythm: " Normal rate and regular rhythm.      Heart sounds: Normal heart sounds. No murmur heard.     No gallop.   Pulmonary:      Effort: Pulmonary effort is normal. No respiratory distress.      Breath sounds: Normal breath sounds. No wheezing.   Abdominal:      General: Bowel sounds are normal. There is no distension.      Palpations: Abdomen is soft.      Tenderness: There is no abdominal tenderness.   Musculoskeletal:         General: No swelling or tenderness. Normal range of motion.   Lymphadenopathy:      Head:      Right side of head: No submental, submandibular, tonsillar, preauricular, posterior auricular or occipital adenopathy.      Left side of head: No submental, submandibular, tonsillar, preauricular, posterior auricular or occipital adenopathy.      Cervical: Cervical adenopathy present.      Right cervical: No superficial, deep or posterior cervical adenopathy.     Left cervical: Superficial cervical adenopathy (mild adneopathy appreciated on exam) present. No deep or posterior cervical adenopathy.      Upper Body:      Right upper body: No supraclavicular adenopathy.      Left upper body: No supraclavicular adenopathy.   Skin:     General: Skin is warm and dry.   Neurological:      Mental Status: She is alert and oriented to person, place, and time.   Psychiatric:         Mood and Affect: Mood normal.         Behavior: Behavior normal.               US-SOFT TISSUES OF HEAD - NECK    Result Date: 12/13/2024 12/13/2024 7:25 AM HISTORY/REASON FOR EXAM:  Mass/Lump TECHNIQUE/EXAM DESCRIPTION: Ultrasound of the soft tissues of the head and neck. COMPARISON:  None FINDINGS: Focused ultrasound of the area of concern left neck demonstrates demonstrates echogenic irregular mass in the left neck at the area of interest measuring 1.7 x 2.9 x 1.3 cm with increased vascularity. Survey of the bilateral lateral cervical lymph node chains reveals no suspicious-appearing lymph nodes.     1.  Echogenic hypervascular mass  in the neck at the area of interest, may represent single lobulated mass or 2 adjacent masses. Etiology of masses is indeterminate and cannot be excluded for neoplasia.              Assessment & Plan     1. Neck mass  CT-SOFT TISSUE NECK WITH              At this time there is an ultrasound that does show a 1.7 x 2.9 x 1.3 cm echogenic hypervascular mass in the neck.  This appears to be either a single lobulated mass or 2 adjacent masses.  I discussed with patient that it would be important for us to do a CT with contrast for further evaluation.  This is currently scheduled for 1/12/2025.  I will contact patient with results once completed to discuss any further plan of care.  Patient did verbalize understanding's and agreement the plan.    I did highly encourage patient to take her blood pressure medication and monitor this closely.  I also recommend that she follow-up with her PCP.  We were able to repeat the blood pressure at the end of the visit and it did slightly go down to 156/108.  I discussed with patient that should she have development of severe headaches or any ill findings, she is to go directly to the hospital.      Please note that this dictation was created using voice recognition software. I have made every reasonable attempt to correct obvious errors, but I expect that there are errors of grammar and possibly content that I did not discover before finalizing the note.

## 2025-01-09 NOTE — Clinical Note
Eusebio Bynum -I am concerned about your patient's blood pressure.  She stated that she is only taking blood pressure medication only if needed.  It was quite high in the office today, which is not uncommon when patients come to see me but it was 172/110 initially.  It did go down to 156/108.  I talked to her about ER precautions.  I recommend that she take her blood pressure medication more consistently but she stated that it is normally been within normal limits.  Just wanted you to be aware. Mona

## 2025-01-12 ENCOUNTER — HOSPITAL ENCOUNTER (OUTPATIENT)
Dept: RADIOLOGY | Facility: MEDICAL CENTER | Age: 60
End: 2025-01-12
Attending: NURSE PRACTITIONER
Payer: COMMERCIAL

## 2025-01-12 DIAGNOSIS — R22.1 NECK MASS: ICD-10-CM

## 2025-01-12 PROCEDURE — 70491 CT SOFT TISSUE NECK W/DYE: CPT

## 2025-01-12 PROCEDURE — 700117 HCHG RX CONTRAST REV CODE 255: Performed by: NURSE PRACTITIONER

## 2025-01-12 RX ADMIN — IOHEXOL 80 ML: 350 INJECTION, SOLUTION INTRAVENOUS at 10:20

## 2025-01-13 ENCOUNTER — TELEPHONE (OUTPATIENT)
Dept: HEMATOLOGY ONCOLOGY | Facility: MEDICAL CENTER | Age: 60
End: 2025-01-13
Payer: COMMERCIAL

## 2025-01-13 DIAGNOSIS — E89.0 HISTORY OF THYROIDECTOMY: ICD-10-CM

## 2025-01-13 DIAGNOSIS — R22.1 NECK MASS: ICD-10-CM

## 2025-01-13 NOTE — TELEPHONE ENCOUNTER
Patient originally seen by me on 1/9/2025 after referral from PCP for lymphadenopathy.  She had noticed a lump on the left side of her neck and ultrasound was completed which showed a 1.7 x 2.9 x 1.3 cm mass.  I recommended CT neck with contrast which was completed this morning on 1/13/2025.    CT shows a 2.4 cm enhancing solid mass anterior to the trachea at the level of the expected location of the thyroid cartilage.  According to the radiologist the thyroid gland itself is surgically absent and therefore this could represent a residual hypertrophy glandular tissue or ectopic thyroid gland tissue.  There is no suspicious mass identified.      In reviewing the medical record patient has had a total thyroidectomy back in 2011.  That pathology was negative.  This may be residual thyroid tissue.  Personally spoke with thyroid surgeon, Dr. Evelyne Escobar who has agreed to see the patient for further evaluation and discussion.  Discussed with patient and she verbalized understanding's and agreed with the plan.    No further follow-up needed with IOC.      CT-SOFT TISSUE NECK WITH    Result Date: 1/13/2025 1/12/2025 9:56 AM HISTORY/REASON FOR EXAM:  Left sided neck mass on U/S . TECHNIQUE/EXAM DESCRIPTION AND NUMBER OF VIEWS:  CT soft tissue neck with contrast. The study was performed on a helical multidetector CT scanner. Contiguous thin section helical images were obtained of the neck from the skull base through the thoracic inlet.  80 mL of Omnipaque 350 nonionic contrast was injected intravenously. Low dose  optimization technique was utilized for this CT exam including automated exposure control and adjustment of the mA and/or kV according to patient size. COMPARISON: None. FINDINGS: BRAIN: Visualized portions of the brain are normal in appearance. TEMPORAL bones: The mastoid air cells and middle ear are clear. PARANASAL SINUSES: Mild mucosal thickening within the lower maxillary sinuses. Otherwise, clear. CERVICAL  spine: Mild degenerative changes. No suspicious osseous lesion. SOFT TISSUES: When compared with the CTA from 10/7/2022, there is a stable 2.4 x 10 mm enhancing solid mass located anterior to the trachea at the level of the thyroid cartilage, located within the deep neck soft tissues on image 68 series 2. This lesion  corresponds with the solid-appearing vascular mass seen on recent ultrasound. Thyroid gland itself is not identified, possibly resected or ablated. NODES: There is no adenopathy or mass within the neck. SALIVARY and THYROID gland: The parotid and submandibular glands are normal in appearance. AIRWAY: The airway appears patent. MEDIASTINUM: The superior mediastinum is normal in appearance. LUNGS: The visualized portions of lungs are clear.     1. There is a 2.4 cm enhancing solid mass anterior to the trachea at the level of the expected location of the thyroid cartilage. The thyroid gland itself is surgically absent. Therefore, this likely represents residual hypertrophy glandular tissue or ectopic thyroid gland tissue. 2. No suspicious mass identified. 3. Mild maxillary sinus mucosal thickening, correlate for potential mild rhinosinusitis.       1. Neck mass  Referral to Surgical Oncology      2. History of thyroidectomy  Referral to Surgical Oncology

## 2025-01-16 ENCOUNTER — APPOINTMENT (OUTPATIENT)
Dept: RADIOLOGY | Facility: MEDICAL CENTER | Age: 60
End: 2025-01-16
Attending: NURSE PRACTITIONER
Payer: COMMERCIAL

## 2025-01-16 DIAGNOSIS — I61.3 PONTINE HEMORRHAGE (HCC): ICD-10-CM

## 2025-01-16 PROCEDURE — A9579 GAD-BASE MR CONTRAST NOS,1ML: HCPCS | Mod: JZ | Performed by: NURSE PRACTITIONER

## 2025-01-16 PROCEDURE — 700117 HCHG RX CONTRAST REV CODE 255: Mod: JZ | Performed by: NURSE PRACTITIONER

## 2025-01-16 PROCEDURE — 70553 MRI BRAIN STEM W/O & W/DYE: CPT

## 2025-01-16 RX ADMIN — GADOTERIDOL 20 ML: 279.3 INJECTION, SOLUTION INTRAVENOUS at 15:21

## 2025-01-20 NOTE — ASSESSMENT & PLAN NOTE
Neck mass most likely residual or ectopic thyroid tissue. I have recommended an FNA for further characterization, will order and see her back for further discussion.

## 2025-01-20 NOTE — PROGRESS NOTES
Surgical Endocrinology New Patient Visit    This is a 59 y.o. female who was referred to my office by Mona Strong for a surgical evaluation of an anterir neck mass.    She is s/p TT in  by Dr. Perkins, pathology revealed benign thyroid tissue with nodular hyperplasia.     She felt a mass in the front of her neck last October.     The patient does not have difficult swallowing.    The patient does not have a family history of thyroid disorders or malignancy.    The patient does not have a history of radiation to their head or neck.    Labs:  Lab Results   Component Value Date/Time    TSHULTRASEN 20.900 (H) 2024 0835    TSHULTRASEN 8.590 (H) 10/07/2022 1628    TSHULTRASEN 2.440 2021 1329     Imagin2024 7:25 AM     HISTORY/REASON FOR EXAM:  Mass/Lump     TECHNIQUE/EXAM DESCRIPTION:     Ultrasound of the soft tissues of the head and neck.     COMPARISON:  None     FINDINGS:     Focused ultrasound of the area of concern left neck demonstrates demonstrates echogenic irregular mass in the left neck at the area of interest measuring 1.7 x 2.9 x 1.3 cm with increased vascularity.     Survey of the bilateral lateral cervical lymph node chains reveals no suspicious-appearing lymph nodes.     IMPRESSION:     1.  Echogenic hypervascular mass in the neck at the area of interest, may represent single lobulated mass or 2 adjacent masses. Etiology of masses is indeterminate and cannot be excluded for neoplasia.    2025 9:56 AM     HISTORY/REASON FOR EXAM:  Left sided neck mass on U/S .     TECHNIQUE/EXAM DESCRIPTION AND NUMBER OF VIEWS:  CT soft tissue neck with contrast.     The study was performed on a helical multidetector CT scanner. Contiguous thin section helical images were obtained of the neck from the skull base through the thoracic inlet.  80 mL of Omnipaque 350 nonionic contrast was injected intravenously. Low dose   optimization technique was utilized for this CT exam including  automated exposure control and adjustment of the mA and/or kV according to patient size.     COMPARISON: None.     FINDINGS:     BRAIN: Visualized portions of the brain are normal in appearance.  TEMPORAL bones: The mastoid air cells and middle ear are clear.  PARANASAL SINUSES: Mild mucosal thickening within the lower maxillary sinuses. Otherwise, clear.  CERVICAL spine: Mild degenerative changes. No suspicious osseous lesion.  SOFT TISSUES: When compared with the CTA from 10/7/2022, there is a stable 2.4 x 10 mm enhancing solid mass located anterior to the trachea at the level of the thyroid cartilage, located within the deep neck soft tissues on image 68 series 2. This lesion   corresponds with the solid-appearing vascular mass seen on recent ultrasound. Thyroid gland itself is not identified, possibly resected or ablated.  NODES: There is no adenopathy or mass within the neck.  SALIVARY and THYROID gland: The parotid and submandibular glands are normal in appearance.  AIRWAY: The airway appears patent.  MEDIASTINUM: The superior mediastinum is normal in appearance.  LUNGS: The visualized portions of lungs are clear.     IMPRESSION:     1. There is a 2.4 cm enhancing solid mass anterior to the trachea at the level of the expected location of the thyroid cartilage. The thyroid gland itself is surgically absent. Therefore, this likely represents residual hypertrophy glandular tissue or ectopic thyroid gland tissue.  2. No suspicious mass identified.  3. Mild maxillary sinus mucosal thickening, correlate for potential mild rhinosinusitis.    Review of Systems   Constitutional:  Negative for chills, fever, malaise/fatigue and weight loss.   HENT: Negative.     Eyes: Negative.    Respiratory:  Negative for cough and sputum production.    Cardiovascular:  Negative for chest pain and palpitations.   Gastrointestinal: Negative.    Genitourinary:  Negative for frequency and urgency.   Musculoskeletal: Negative.    Skin:  Negative.    Neurological:  Negative for dizziness, weakness and headaches.   Endo/Heme/Allergies: Negative.    Psychiatric/Behavioral:  Negative for depression and memory loss. The patient is not nervous/anxious and does not have insomnia.    All other systems reviewed and are negative.    Past Medical History:   Diagnosis Date    Anemia     Clostridium difficile infection 01/01/2011    HTN (hypertension) 07/29/2011    has been on Lisinopril, off at this time    Hypothyroidism, postsurgical 01/01/2011    benign thyroid nodules; thyroidectomy    Other specified disorder of intestines     constipation    Other specified symptom associated with female genital organs     Stroke (HCC) 2022    hemorrhagic stroke     Past Surgical History:   Procedure Laterality Date    VAGINAL HYSTERECTOMY SCOPE TOTAL  6/16/2014    Performed by Kelechi Ojeda M.D. at SURGERY [facility] ORS    THYROIDECTOMY TOTAL  8/3/2011    Performed by REBEKA FORD at SURGERY SAME DAY [facility] ORS    SUBMANDIBLE ABSCESS INCISION AND DRAINAGE  7/6/2011    Performed by REBEKA HAYS at SURGERY [facility] ORS    DENTAL EXTRACTION(S)  7/6/2011    Performed by REBEKA HAYS at SURGERY [facility] ORS     Home Medications    Medication Sig Taking? Last Dose Authorizing Provider   losartan (COZAAR) 50 MG Tab Take 50 mg by mouth every day. Yes Taking Physician Outpatient   levothyroxine (SYNTHROID) 50 MCG Tab Take 1 Tablet by mouth every morning on an empty stomach for 90 days. Total dose 225 mcg Yes Taking Fletcher Olvera, A.P.R.N.   amLODIPine (NORVASC) 10 MG Tab TAKE 1 TABLET BY MOUTH EVERY DAY Yes Taking Fletcher Olvera, A.P.R.N.   Coenzyme Q10 (COQ10 PO) Take  by mouth every day. Yes Taking Physician Outpatient   VITAMIN K PO Take  by mouth every day. Yes Taking Physician Outpatient   Multiple Vitamins-Minerals (ZINC PO) Take 1 Tablet by mouth every morning. Yes Taking Physician Outpatient   SELENIUM PO Take 1 Tablet by  mouth every morning. Yes Taking Physician Outpatient   Ascorbic Acid (VITAMIN C PO) Take 1 Tablet by mouth every evening. Yes Taking Physician Outpatient   Magnesium 400 MG CAPS Take 400 mg by mouth every morning. Yes Taking Physician Outpatient   Cholecalciferol (VITAMIN D-3) 5000 UNITS TABS Take 2 Tablets by mouth every evening. Yes Taking Physician Outpatient     Physical Exam  Constitutional:       Appearance: Normal appearance.   HENT:      Head: Normocephalic and atraumatic.   Neck:        Comments: Left upper midline neck mass, soft  Cardiovascular:      Rate and Rhythm: Normal rate.   Pulmonary:      Effort: Pulmonary effort is normal.   Abdominal:      General: Abdomen is flat.      Palpations: Abdomen is soft.   Musculoskeletal:         General: Normal range of motion.      Cervical back: Normal range of motion and neck supple.   Skin:     General: Skin is warm and dry.   Neurological:      General: No focal deficit present.      Mental Status: She is alert.   Psychiatric:         Mood and Affect: Mood normal.       Assessment/Plan:    Neck mass  Neck mass most likely residual or ectopic thyroid tissue. I have recommended an FNA for further characterization, will order and see her back for further discussion.     Evelyne Escobar M.D., FACS  Department of Surgical Oncology  [facility]  [phone]

## 2025-01-21 DIAGNOSIS — M89.9 FRONTAL SKULL LESION: ICD-10-CM

## 2025-01-23 DIAGNOSIS — M89.9 FRONTAL SKULL LESION: ICD-10-CM

## 2025-01-27 ENCOUNTER — OFFICE VISIT (OUTPATIENT)
Dept: SURGICAL ONCOLOGY | Facility: MEDICAL CENTER | Age: 60
End: 2025-01-27
Payer: COMMERCIAL

## 2025-01-27 VITALS
WEIGHT: 293 LBS | OXYGEN SATURATION: 97 % | SYSTOLIC BLOOD PRESSURE: 144 MMHG | HEART RATE: 73 BPM | DIASTOLIC BLOOD PRESSURE: 88 MMHG | HEIGHT: 69 IN | TEMPERATURE: 98.5 F | BODY MASS INDEX: 43.4 KG/M2

## 2025-01-27 DIAGNOSIS — R22.1 NECK MASS: ICD-10-CM

## 2025-01-27 PROCEDURE — 3079F DIAST BP 80-89 MM HG: CPT | Performed by: SURGERY

## 2025-01-27 PROCEDURE — 99214 OFFICE O/P EST MOD 30 MIN: CPT | Performed by: SURGERY

## 2025-01-27 PROCEDURE — 3077F SYST BP >= 140 MM HG: CPT | Performed by: SURGERY

## 2025-01-27 ASSESSMENT — ENCOUNTER SYMPTOMS
WEAKNESS: 0
SPUTUM PRODUCTION: 0
DEPRESSION: 0
WEIGHT LOSS: 0
CHILLS: 0
FEVER: 0
MUSCULOSKELETAL NEGATIVE: 1
EYES NEGATIVE: 1
GASTROINTESTINAL NEGATIVE: 1
HEADACHES: 0
INSOMNIA: 0
DIZZINESS: 0
COUGH: 0
MEMORY LOSS: 0
NERVOUS/ANXIOUS: 0
PALPITATIONS: 0

## 2025-01-27 ASSESSMENT — FIBROSIS 4 INDEX: FIB4 SCORE: 1.11

## 2025-02-10 ENCOUNTER — HOSPITAL ENCOUNTER (OUTPATIENT)
Dept: RADIOLOGY | Facility: MEDICAL CENTER | Age: 60
End: 2025-02-10
Attending: NURSE PRACTITIONER
Payer: COMMERCIAL

## 2025-02-10 DIAGNOSIS — M89.9 FRONTAL SKULL LESION: ICD-10-CM

## 2025-02-10 PROCEDURE — A9503 TC99M MEDRONATE: HCPCS

## 2025-02-18 ENCOUNTER — HOSPITAL ENCOUNTER (OUTPATIENT)
Dept: HEMATOLOGY ONCOLOGY | Facility: MEDICAL CENTER | Age: 60
End: 2025-02-18
Attending: NURSE PRACTITIONER
Payer: COMMERCIAL

## 2025-02-18 VITALS
HEART RATE: 71 BPM | HEIGHT: 69 IN | TEMPERATURE: 97.2 F | SYSTOLIC BLOOD PRESSURE: 152 MMHG | OXYGEN SATURATION: 97 % | BODY MASS INDEX: 43.4 KG/M2 | WEIGHT: 293 LBS | DIASTOLIC BLOOD PRESSURE: 98 MMHG | RESPIRATION RATE: 14 BRPM

## 2025-02-18 DIAGNOSIS — E89.0 HISTORY OF THYROIDECTOMY: ICD-10-CM

## 2025-02-18 DIAGNOSIS — M89.9 FRONTAL SKULL LESION: ICD-10-CM

## 2025-02-18 DIAGNOSIS — R22.1 NECK MASS: ICD-10-CM

## 2025-02-18 PROCEDURE — 99212 OFFICE O/P EST SF 10 MIN: CPT | Performed by: NURSE PRACTITIONER

## 2025-02-18 PROCEDURE — 99214 OFFICE O/P EST MOD 30 MIN: CPT | Performed by: NURSE PRACTITIONER

## 2025-02-18 ASSESSMENT — ENCOUNTER SYMPTOMS
SORE THROAT: 0
FEVER: 0
CHILLS: 0
CONSTIPATION: 0
DIARRHEA: 0
VOMITING: 0
HEADACHES: 0
SHORTNESS OF BREATH: 0
WEIGHT LOSS: 0
TINGLING: 0
PALPITATIONS: 0
DIAPHORESIS: 0
NAUSEA: 0
DIZZINESS: 0
MYALGIAS: 0
COUGH: 0

## 2025-02-18 ASSESSMENT — PAIN SCALES - GENERAL: PAINLEVEL_OUTOF10: NO PAIN

## 2025-02-18 ASSESSMENT — FIBROSIS 4 INDEX: FIB4 SCORE: 1.11

## 2025-02-18 NOTE — PROGRESS NOTES
Subjective     Brielle Krishnamurthy is a 59 y.o. female who presents with New Patient (IOC/ IOC Frontal skull lesion/ Fletcher Olvera)          HPI    Patient referred to me, Intake Oncology Coordinator by her PCP JAIMIE Saldaña for bone lesion of the skull.  Patient is unaccompanied for today's visit.    Patient known to me as she was previously seen back on 1/9/2025 after referral from PCP for lymphadenopathy of the head and neck.  She had had an ultrasound completed on 12/13/2024 which showed an echogenic hypervascular mass in the left neck that may represent a single lobulated mass or 2 adjacent masses. This was measured at 1.7 x 2.9 x 1.3 cm in size.  She was subsequently referred for a CT neck completed on 1/13/2025.  The CT showed a 2.4 cm enhancing solid mass anterior to the trachea at the level of the expected location of the thyroid cartilage. According to the radiologist the thyroid gland itself is surgically absent and therefore this could represent a residual hypertrophy glandular tissue or ectopic thyroid gland tissue. There is no suspicious mass identified.  Patient with a history of a total thyroidectomy back in 2011 and the pathology was negative.  I have personally spoken with Dr. Evelyne Escobar, thyroid surgeon who thought this may be residual thyroid tissue and patient was subsequent referred to Dr. Escobar.  She was seen in Dr. Escobar's clinic on 1/27/2025 for further evaluation and FNA biopsy of the thyroid tissue was recommended but is pending at this time.    PCP had followed up with the patient and requested an MRI brain due to patient's history of a pontine hemorrhage, hypoxia and hypertensive urgency back in October 2022.  MRI brain completed on 1/16/2025 showed no evidence of an acute infarction, acute hemorrhage or enhancing mass lesion.  However there was a 12 mm FLAIR hyperintense enhancing lesion in the left frontal bone which appears to be slightly larger than  the previous study in December 2022.  Radiologist did state that this was nonspecific and could represent a slow-growing hemangioma or more concerning lesion such as a plasmacytoma or slow-growing malignancy.  Bone scan could be of benefit which was completed per PCP on 2/10/2025.  Bone scan showed the lesion noted on the MRI which was only very mildly increased activity and indeterminate.  No other abnormalities seen.  I did personally review the imaging reports and images in detail of the MRI and bone scan, and I discussed them in detail with the patient today.    Clinically patient is doing well.  She denies any clinical changes since she was last seen last month.  She does continue to have fatigue but she equates this to her thyroid function as her TSH levels have been elevated and she is altering her Synthroid medication per her PCP for better control of her hypothyroidism.  She denies any headaches, or dizziness.  She denies any dysphagia.    Please see past medical and surgical history below.    Patient is a never smoker.    Patient has a family history of cancer in her sister who had rhabdomyosarcoma at the age of 21 and passed away from this disease.    No Known Allergies    Current Outpatient Medications on File Prior to Encounter   Medication Sig Dispense Refill    losartan (COZAAR) 50 MG Tab Take 50 mg by mouth every day.      levothyroxine (SYNTHROID) 50 MCG Tab Take 1 Tablet by mouth every morning on an empty stomach for 90 days. Total dose 225 mcg 90 Tablet 0    amLODIPine (NORVASC) 10 MG Tab TAKE 1 TABLET BY MOUTH EVERY DAY 90 Tablet 0    Coenzyme Q10 (COQ10 PO) Take  by mouth every day.      VITAMIN K PO Take  by mouth every day.      Multiple Vitamins-Minerals (ZINC PO) Take 1 Tablet by mouth every morning.      SELENIUM PO Take 1 Tablet by mouth every morning.      Ascorbic Acid (VITAMIN C PO) Take 1 Tablet by mouth every evening.      Magnesium 400 MG CAPS Take 400 mg by mouth every morning.       Cholecalciferol (VITAMIN D-3) 5000 UNITS TABS Take 2 Tablets by mouth every evening.       No current facility-administered medications on file prior to encounter.       Past Medical History:   Diagnosis Date    Anemia     Clostridium difficile infection 01/01/2011    HTN (hypertension) 07/29/2011    has been on Lisinopril, off at this time    Hypothyroidism, postsurgical 01/01/2011    benign thyroid nodules; thyroidectomy    Other specified disorder of intestines     constipation    Other specified symptom associated with female genital organs     Stroke (HCC) 2022    hemorrhagic stroke       Past Surgical History:   Procedure Laterality Date    VAGINAL HYSTERECTOMY SCOPE TOTAL  6/16/2014    Performed by Kelechi Ojeda M.D. at SURGERY [facility] ORS    THYROIDECTOMY TOTAL  8/3/2011    Performed by REBEKA FORD at SURGERY SAME DAY [facility]S    SUBMANDIBLE ABSCESS INCISION AND DRAINAGE  7/6/2011    Performed by REBEKA HAYS at SURGERY [facility] ORS    DENTAL EXTRACTION(S)  7/6/2011    Performed by REBEKA HAYS at SURGERY [facility] ORS       Family History   Problem Relation Age of Onset    Hyperlipidemia Mother     Hypertension Mother     Hyperlipidemia Father     Hypertension Father     Cancer Sister 21        Rhabdomyosarcoma    Heart Disease Other     Stroke Other        Social History     Socioeconomic History    Marital status:    Tobacco Use    Smoking status: Never    Smokeless tobacco: Never    Tobacco comments:     occasional social smoker   Vaping Use    Vaping status: Never Used   Substance and Sexual Activity    Alcohol use: Not Currently     Comment: Last in 2022 - since her stroke    Drug use: No    Sexual activity: Yes     Partners: Male     Birth control/protection: Surgical     Comment: Pt had a hysterectomy         Review of Systems   Constitutional:  Positive for malaise/fatigue (r/t to thyroid dysfunction - elevated TSH (being monitored by PCP)). Negative for chills,  "diaphoresis, fever and weight loss.   HENT:  Negative for sore throat.         No dysphagia noted   Respiratory:  Negative for cough and shortness of breath.    Cardiovascular:  Negative for chest pain and palpitations.   Gastrointestinal:  Negative for constipation, diarrhea, nausea and vomiting.   Genitourinary:  Negative for dysuria.   Musculoskeletal:  Negative for myalgias.   Neurological:  Negative for dizziness, tingling and headaches.              Objective     BP (!) 152/98 (BP Location: Right arm, Patient Position: Sitting, BP Cuff Size: Adult long)   Pulse 71   Temp 36.2 °C (97.2 °F) (Temporal)   Resp 14   Ht 1.753 m (5' 9.02\")   Wt (!) 137 kg (301 lb 11.2 oz)   LMP 06/10/2014   SpO2 97%   BMI 44.53 kg/m²      Physical Exam  Vitals reviewed.   Constitutional:       General: She is not in acute distress.     Appearance: Normal appearance. She is not diaphoretic.   HENT:      Head: Normocephalic and atraumatic.   Neck:      Thyroid: Thyroid mass (noted on the left on physical exam) present. No thyroid tenderness.   Cardiovascular:      Rate and Rhythm: Normal rate and regular rhythm.      Heart sounds: Normal heart sounds. No murmur heard.     No friction rub. No gallop.   Pulmonary:      Effort: Pulmonary effort is normal. No respiratory distress.      Breath sounds: Normal breath sounds. No wheezing.   Musculoskeletal:         General: No swelling or tenderness. Normal range of motion.   Lymphadenopathy:      Head:      Right side of head: No submental, submandibular, tonsillar, preauricular, posterior auricular or occipital adenopathy.      Left side of head: No submental, submandibular, tonsillar, preauricular, posterior auricular or occipital adenopathy.      Cervical: No cervical adenopathy.      Right cervical: No superficial, deep or posterior cervical adenopathy.     Left cervical: No superficial, deep or posterior cervical adenopathy.   Skin:     General: Skin is warm and dry. " "  Neurological:      Mental Status: She is alert and oriented to person, place, and time.   Psychiatric:         Mood and Affect: Mood normal.         Behavior: Behavior normal.              IMAGING   NM-BONE/JOINT SCAN LIMITED AREA    Result Date: 2/10/2025  2/10/2025 1:28 PM HISTORY/REASON FOR EXAM:  Frontal Bone; see MRI TECHNIQUE/EXAM DESCRIPTION AND NUMBER OF VIEWS:  25.8 mCi Tc 99m-MDP was administered intravenously followed by delayed imaging of the head and neck. COMPARISON:  MRI dated 1/16/2025 FINDINGS:     There is a subtle area of mildly increased activity overlying the left frontal skull which likely corresponds with the small lesion noted on the prior MRI. Additional areas of increased activity involving the maxillofacial region are likely  due to sinus and periodontal disease. Increased activity is also noted involving the shoulders and spine which is likely degenerative. No other areas of abnormal activity are appreciated.     The lesion noted on the prior MRI demonstrates only very mildly increased activity and is indeterminate.       MR-BRAIN-WITH & W/O   01/16/25    IMPRESSION:  1.  Moderate supratentorial white matter disease most consistent with microvascular ischemic change.  2.  12 mm FLAIR hyperintense enhancing lesion in the left frontal bone. The lesion does measure slightly larger than on prior study from 2022. The lesion is nonspecific and could represent a slowly growing hemangioma or a more concerning lesion such as a   nearly quiescent plasmacytoma or slow growing malignancy. Radionuclide bone scan may be of interest to determine whether there is an \"active\" lesion.  3.  Subcentimeter focus of hemosiderin deposition at the site of previously seen right paramedian pontine hemorrhage. No evidence of new hemorrhage.  4.  Minimal pontine ischemic gliosis.  5.  No evidence of acute infarction, acute hemorrhage, or enhancing mass lesion      CT-SOFT TISSUE NECK WITH     Result Date: " 1/13/2025 1/12/2025 9:56 AM HISTORY/REASON FOR EXAM:  Left sided neck mass on U/S . TECHNIQUE/EXAM DESCRIPTION AND NUMBER OF VIEWS:  CT soft tissue neck with contrast. The study was performed on a helical multidetector CT scanner. Contiguous thin section helical images were obtained of the neck from the skull base through the thoracic inlet.  80 mL of Omnipaque 350 nonionic contrast was injected intravenously. Low dose  optimization technique was utilized for this CT exam including automated exposure control and adjustment of the mA and/or kV according to patient size. COMPARISON: None. FINDINGS: BRAIN: Visualized portions of the brain are normal in appearance. TEMPORAL bones: The mastoid air cells and middle ear are clear. PARANASAL SINUSES: Mild mucosal thickening within the lower maxillary sinuses. Otherwise, clear. CERVICAL spine: Mild degenerative changes. No suspicious osseous lesion. SOFT TISSUES: When compared with the CTA from 10/7/2022, there is a stable 2.4 x 10 mm enhancing solid mass located anterior to the trachea at the level of the thyroid cartilage, located within the deep neck soft tissues on image 68 series 2. This lesion  corresponds with the solid-appearing vascular mass seen on recent ultrasound. Thyroid gland itself is not identified, possibly resected or ablated. NODES: There is no adenopathy or mass within the neck. SALIVARY and THYROID gland: The parotid and submandibular glands are normal in appearance. AIRWAY: The airway appears patent. MEDIASTINUM: The superior mediastinum is normal in appearance. LUNGS: The visualized portions of lungs are clear.      1. There is a 2.4 cm enhancing solid mass anterior to the trachea at the level of the expected location of the thyroid cartilage. The thyroid gland itself is surgically absent. Therefore, this likely represents residual hypertrophy glandular tissue or ectopic thyroid gland tissue. 2. No suspicious mass identified. 3. Mild maxillary sinus  mucosal thickening, correlate for potential mild rhinosinusitis.       US-SOFT TISSUES OF HEAD - NECK     Result Date: 12/13/2024 12/13/2024 7:25 AM HISTORY/REASON FOR EXAM:  Mass/Lump TECHNIQUE/EXAM DESCRIPTION: Ultrasound of the soft tissues of the head and neck. COMPARISON:  None FINDINGS: Focused ultrasound of the area of concern left neck demonstrates demonstrates echogenic irregular mass in the left neck at the area of interest measuring 1.7 x 2.9 x 1.3 cm with increased vascularity. Survey of the bilateral lateral cervical lymph node chains reveals no suspicious-appearing lymph nodes.      1.  Echogenic hypervascular mass in the neck at the area of interest, may represent single lobulated mass or 2 adjacent masses. Etiology of masses is indeterminate and cannot be excluded for neoplasia.       LAB RESULTS     Latest Reference Range & Units 11/13/24 08:35   WBC 4.8 - 10.8 K/uL 5.2   RBC 4.20 - 5.40 M/uL 5.35   Hemoglobin 12.0 - 16.0 g/dL 13.9   Hematocrit 37.0 - 47.0 % 44.4   MCV 81.4 - 97.8 fL 83.0   MCH 27.0 - 33.0 pg 26.0 (L)   MCHC 32.2 - 35.5 g/dL 31.3 (L)   RDW 35.9 - 50.0 fL 45.0   Platelet Count 164 - 446 K/uL 306   MPV 9.0 - 12.9 fL 10.4   Neutrophils-Polys 44.00 - 72.00 % 59.80   Neutrophils (Absolute) 1.82 - 7.42 K/uL 3.09   Lymphocytes 22.00 - 41.00 % 29.70   Lymphs (Absolute) 1.00 - 4.80 K/uL 1.53   Monocytes 0.00 - 13.40 % 6.00   Monos (Absolute) 0.00 - 0.85 K/uL 0.31   Eosinophils 0.00 - 6.90 % 2.50   Eos (Absolute) 0.00 - 0.51 K/uL 0.13   Basophils 0.00 - 1.80 % 1.40   Baso (Absolute) 0.00 - 0.12 K/uL 0.07   Immature Granulocytes 0.00 - 0.90 % 0.60   Immature Granulocytes (abs) 0.00 - 0.11 K/uL 0.03   Nucleated RBC 0.00 - 0.20 /100 WBC 0.00   NRBC (Absolute) K/uL 0.00   Sodium 135 - 145 mmol/L 140   Potassium 3.6 - 5.5 mmol/L 4.2   Chloride 96 - 112 mmol/L 104   Co2 20 - 33 mmol/L 27   Anion Gap 7.0 - 16.0  9.0   Glucose 65 - 99 mg/dL 97   Bun 8 - 22 mg/dL 12   Creatinine 0.50 - 1.40 mg/dL  0.67   GFR (CKD-EPI) >60 mL/min/1.73 m 2 101   Calcium 8.5 - 10.5 mg/dL 9.0   Correct Calcium 8.5 - 10.5 mg/dL 8.8   AST(SGOT) 12 - 45 U/L 22   ALT(SGPT) 2 - 50 U/L 14   Alkaline Phosphatase 30 - 99 U/L 75   Total Bilirubin 0.1 - 1.5 mg/dL 0.4   Albumin 3.2 - 4.9 g/dL 4.3   Total Protein 6.0 - 8.2 g/dL 7.4   Globulin 1.9 - 3.5 g/dL 3.1   A-G Ratio g/dL 1.4            Assessment & Plan     1. Frontal skull lesion  Monoclonal Protein and FLC, Serum    Monoclonal Protein, Ur (24 hr or Random)      2. Neck mass        3. History of thyroidectomy                1. 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  This is very nonspecific.  Bone scan noted slight elevated activity as well.  Various differentials noted but do include plasmacytoma versus a slow-growing malignancy.  Will go ahead and start with workup including monoclonal protein studies of both urine and serum for further evaluation.  Will await the results of these labs first to determine next plan of care.  If these are negative, then we will likely proceed with a CT CAP to search for a primary source of malignancy or other bone lesions noted that may be amendable for biopsy.  I will contact patient with the results of the labs and discuss further plan of care at that time.    2.  Thyroid mass -patient originally seen back in January 2025 as discussed above and CT scan showed a 2.4 cm enhancing solid mass anterior to the trachea at the level of the expected location of the thyroid cartilage.  She was referred to Dr. Evelyne Escobar and established with her.  FNA biopsy has been ordered but still pending.  Discussed this with patient today and she is aware that FNA is requested and we will work on getting this scheduled.  I will defer any further workup/management or monitoring of this to Dr. Escboar at this time.    Patient did verbalize  understanding's and agreed with the plan discussed above.      Please note that this dictation was created using voice recognition software. I have made every reasonable attempt to correct obvious errors, but I expect that there are errors of grammar and possibly content that I did not discover before finalizing the note.

## 2025-03-06 ENCOUNTER — OFFICE VISIT (OUTPATIENT)
Dept: MEDICAL GROUP | Facility: PHYSICIAN GROUP | Age: 60
End: 2025-03-06
Payer: COMMERCIAL

## 2025-03-06 VITALS
HEIGHT: 69 IN | SYSTOLIC BLOOD PRESSURE: 143 MMHG | OXYGEN SATURATION: 93 % | TEMPERATURE: 97.5 F | DIASTOLIC BLOOD PRESSURE: 100 MMHG | HEART RATE: 67 BPM | WEIGHT: 293 LBS | BODY MASS INDEX: 43.4 KG/M2

## 2025-03-06 DIAGNOSIS — F43.21 SITUATIONAL DEPRESSION: ICD-10-CM

## 2025-03-06 DIAGNOSIS — E89.0 HYPOTHYROIDISM, POSTSURGICAL: ICD-10-CM

## 2025-03-06 DIAGNOSIS — Z12.31 ENCOUNTER FOR SCREENING MAMMOGRAM FOR MALIGNANT NEOPLASM OF BREAST: ICD-10-CM

## 2025-03-06 DIAGNOSIS — I10 ESSENTIAL HYPERTENSION: ICD-10-CM

## 2025-03-06 DIAGNOSIS — E66.01 MORBID OBESITY WITH BMI OF 40.0-44.9, ADULT (HCC): ICD-10-CM

## 2025-03-06 PROCEDURE — 99214 OFFICE O/P EST MOD 30 MIN: CPT | Performed by: NURSE PRACTITIONER

## 2025-03-06 PROCEDURE — 3080F DIAST BP >= 90 MM HG: CPT | Performed by: NURSE PRACTITIONER

## 2025-03-06 PROCEDURE — 3077F SYST BP >= 140 MM HG: CPT | Performed by: NURSE PRACTITIONER

## 2025-03-06 RX ORDER — LEVOTHYROXINE SODIUM 175 UG/1
175 TABLET ORAL
Qty: 30 TABLET | Refills: 0 | Status: SHIPPED | OUTPATIENT
Start: 2025-03-06

## 2025-03-06 ASSESSMENT — FIBROSIS 4 INDEX: FIB4 SCORE: 1.11

## 2025-03-06 ASSESSMENT — PATIENT HEALTH QUESTIONNAIRE - PHQ9
CLINICAL INTERPRETATION OF PHQ2 SCORE: 6
5. POOR APPETITE OR OVEREATING: 3 - NEARLY EVERY DAY
SUM OF ALL RESPONSES TO PHQ QUESTIONS 1-9: 21

## 2025-03-06 NOTE — PROGRESS NOTES
Verbal consent was acquired by the patient to use [employer] ambient listening note generation during this visit yes    Subjective:     HPI:   History of Present Illness  The patient presents for evaluation of hypertension, hypothyroidism, and situational depression.    Hypertension  She reports persistent hypertension, particularly elevated diastolic readings, which require significant effort to manage. She has been adhering to her prescribed antihypertensive regimen, although she did not take her medication this morning. She notes that her blood pressure typically normalizes within a few hours of medication administration. She had previously reached a point where antihypertensive therapy was not required but has recently resumed daily medication. She also reports that her diastolic blood pressure tends to be high upon returning home from work, often reaching 100.  - Onset: Persistent, with recent resumption of daily medication.  - Location: Blood pressure readings.  - Duration: Persistent hypertension.  - Character: Elevated diastolic readings, particularly high upon returning home from work.  - Alleviating/Aggravating Factors: Medication adherence; did not take medication this morning.  - Timing: Blood pressure normalizes within a few hours of medication administration.  - Severity: Diastolic blood pressure often reaching 100.    Hypothyroidism  She expresses concern about her inability to perform daily activities due to severe fatigue. She finds it challenging to maintain her work schedule and accomplish tasks at home. Despite an increase in her levothyroxine dosage, she reports no improvement in her symptoms. Her thyroid levels have not been monitored. She was advised to continue the increased dosage for 3 months and assess her response. She has been compliant with this regimen and has only 2 tablets remaining. She plans to undergo lab work tomorrow. She has not yet scheduled an appointment with another  "endocrinologist. She reports that her symptoms have worsened since her last visit. She has not tried Tirosint.  - Onset: Symptoms have worsened since her last visit.  - Duration: Persistent severe fatigue.  - Character: Severe fatigue, inability to perform daily activities.  - Alleviating/Aggravating Factors: Increase in levothyroxine dosage; no improvement in symptoms.  - Timing: Advised to continue increased dosage for 3 months; plans to undergo lab work tomorrow.  - Severity: Severe fatigue affecting daily activities and work schedule.    Situational Depression  She declines the use of statins due to personal aversion. She acknowledges difficulty in managing stress and major issues, attributing this to her constant fatigue and general malaise.  - Character: Difficulty managing stress and major issues.  - Alleviating/Aggravating Factors: Constant fatigue and general malaise.  - Severity: Significant impact on stress management and overall well-being.    She has been contacted to schedule a mammogram but has deferred it due to other health concerns. She is awaiting a biopsy appointment and has been advised to undergo a CT scan of her upper body.    MEDICATIONS  - Levothyroxine    Health Maintenance: Completed    Objective:     Exam:  BP (!) 143/100 (BP Location: Left arm, Patient Position: Sitting, BP Cuff Size: Adult)   Pulse 67   Temp 36.4 °C (97.5 °F) (Temporal)   Ht 1.753 m (5' 9\")   Wt (!) 135 kg (298 lb)   LMP 06/10/2014   SpO2 93%   BMI 44.01 kg/m²  Body mass index is 44.01 kg/m².    Constitutional: Alert, no distress, well-groomed.  Skin: Warm, dry, good turgor, no rashes in visible areas.  Eye: Equal, round and reactive, conjunctiva clear, lids normal.  ENMT: Lips without lesions, good dentition, moist mucous membranes.  Neck: Trachea midline, no masses, no thyromegaly.  Respiratory: Unlabored respiratory effort, no cough.  MSK: Normal gait, moves all extremities.  Neuro: Grossly non-focal.   Psych: " Alert and oriented x3, normal affect and mood.    Results      Assessment & Plan:     1. Essential hypertension  Comp Metabolic Panel    Lipid Profile      2. Hypothyroidism, postsurgical  TRIIDOTHYRONINE    FREE THYROXINE    TSH    levothyroxine (SYNTHROID) 175 MCG Tab      3. Situational depression  Patient has been identified as having a positive depression screening. Appropriate orders and counseling have been given.      4. Encounter for screening mammogram for malignant neoplasm of breast  MA-SCREENING MAMMO BILAT W/TOMOSYNTHESIS W/CAD      5. Morbid obesity with BMI of 40.0-44.9, adult (HCC)  Patient identified as having weight management issue.  Appropriate orders and counseling given.          Assessment & Plan  1. Hypertension: Elevated diastolic component.  - Continue taking blood pressure medications daily.  -Continue losartan 50 mg by mouth daily, amlodipine 10 mg by mouth daily    2. Hypothyroidism: Symptoms exacerbated.  - Prescription for Synthroid provided.  - Undergo laboratory tests including T4, T3, and TSH levels.  - Consider low dose of Cytomel if T3 levels are low.  - Consult with an endocrinologist for further management.  -Continue Synthroid 175 mcg by mouth on an empty stomach daily until labs are returned    3. Situational depression.  - Focus on managing physical symptoms to alleviate depressive symptoms.  - Further evaluation and potential treatment for depression if symptoms persist or worsen.    4. Health maintenance.  - May receive a call regarding scheduling a mammogram.    Follow-up  - Follow up in 1 month.      Return in about 1 month (around 4/6/2025), or if symptoms worsen or fail to improve, for Medication Review.    I have placed the below orders and discussed them with an approved delegating provider. The MA is performing the below orders under the direction of Dr. Medrano.      Please note that this dictation was created using voice recognition software. I have made every  reasonable attempt to correct obvious errors, but I expect that there are errors of grammar and possibly content that I did not discover before finalizing the note.

## 2025-03-24 ENCOUNTER — HOSPITAL ENCOUNTER (OUTPATIENT)
Dept: LAB | Facility: MEDICAL CENTER | Age: 60
End: 2025-03-24
Attending: INTERNAL MEDICINE
Payer: COMMERCIAL

## 2025-03-24 DIAGNOSIS — I10 ESSENTIAL HYPERTENSION: ICD-10-CM

## 2025-03-24 DIAGNOSIS — E89.0 HYPOTHYROIDISM, POSTSURGICAL: ICD-10-CM

## 2025-03-24 LAB
ALBUMIN SERPL BCP-MCNC: 4.1 G/DL (ref 3.2–4.9)
ALBUMIN/GLOB SERPL: 1.3 G/DL
ALP SERPL-CCNC: 99 U/L (ref 30–99)
ALT SERPL-CCNC: 87 U/L (ref 2–50)
ANION GAP SERPL CALC-SCNC: 11 MMOL/L (ref 7–16)
AST SERPL-CCNC: 63 U/L (ref 12–45)
BILIRUB SERPL-MCNC: 0.5 MG/DL (ref 0.1–1.5)
BUN SERPL-MCNC: 12 MG/DL (ref 8–22)
CALCIUM ALBUM COR SERPL-MCNC: 9.1 MG/DL (ref 8.5–10.5)
CALCIUM SERPL-MCNC: 9.2 MG/DL (ref 8.5–10.5)
CHLORIDE SERPL-SCNC: 105 MMOL/L (ref 96–112)
CHOLEST SERPL-MCNC: 269 MG/DL (ref 100–199)
CO2 SERPL-SCNC: 27 MMOL/L (ref 20–33)
CREAT SERPL-MCNC: 0.81 MG/DL (ref 0.5–1.4)
FASTING STATUS PATIENT QL REPORTED: NORMAL
GFR SERPLBLD CREATININE-BSD FMLA CKD-EPI: 84 ML/MIN/1.73 M 2
GLOBULIN SER CALC-MCNC: 3.2 G/DL (ref 1.9–3.5)
GLUCOSE SERPL-MCNC: 106 MG/DL (ref 65–99)
HDLC SERPL-MCNC: 55 MG/DL
LDLC SERPL CALC-MCNC: 183 MG/DL
POTASSIUM SERPL-SCNC: 4.6 MMOL/L (ref 3.6–5.5)
PROT SERPL-MCNC: 7.3 G/DL (ref 6–8.2)
SODIUM SERPL-SCNC: 143 MMOL/L (ref 135–145)
T3 SERPL-MCNC: 94.8 NG/DL (ref 60–181)
T3FREE SERPL-MCNC: 2.18 PG/ML (ref 2–4.4)
T4 FREE SERPL-MCNC: 0.96 NG/DL (ref 0.93–1.7)
T4 FREE SERPL-MCNC: 1 NG/DL (ref 0.93–1.7)
TRIGL SERPL-MCNC: 157 MG/DL (ref 0–149)
TSH SERPL-ACNC: 13.6 UIU/ML (ref 0.35–5.5)
TSH SERPL-ACNC: 13.6 UIU/ML (ref 0.35–5.5)

## 2025-03-24 PROCEDURE — 84443 ASSAY THYROID STIM HORMONE: CPT

## 2025-03-24 PROCEDURE — 84480 ASSAY TRIIODOTHYRONINE (T3): CPT

## 2025-03-24 PROCEDURE — 84439 ASSAY OF FREE THYROXINE: CPT | Mod: 91

## 2025-03-24 PROCEDURE — 80061 LIPID PANEL: CPT

## 2025-03-24 PROCEDURE — 84439 ASSAY OF FREE THYROXINE: CPT

## 2025-03-24 PROCEDURE — 84443 ASSAY THYROID STIM HORMONE: CPT | Mod: 91

## 2025-03-24 PROCEDURE — 80053 COMPREHEN METABOLIC PANEL: CPT

## 2025-03-24 PROCEDURE — 84481 FREE ASSAY (FT-3): CPT

## 2025-03-24 PROCEDURE — 36415 COLL VENOUS BLD VENIPUNCTURE: CPT

## 2025-03-25 ENCOUNTER — RESULTS FOLLOW-UP (OUTPATIENT)
Dept: MEDICAL GROUP | Facility: PHYSICIAN GROUP | Age: 60
End: 2025-03-25

## 2025-04-01 ENCOUNTER — HOSPITAL ENCOUNTER (OUTPATIENT)
Dept: RADIOLOGY | Facility: MEDICAL CENTER | Age: 60
End: 2025-04-01
Attending: SURGERY
Payer: COMMERCIAL

## 2025-04-01 PROCEDURE — 10005 FNA BX W/US GDN 1ST LES: CPT

## 2025-04-01 PROCEDURE — 88173 CYTOPATH EVAL FNA REPORT: CPT | Mod: 26 | Performed by: STUDENT IN AN ORGANIZED HEALTH CARE EDUCATION/TRAINING PROGRAM

## 2025-04-01 PROCEDURE — 88173 CYTOPATH EVAL FNA REPORT: CPT | Performed by: STUDENT IN AN ORGANIZED HEALTH CARE EDUCATION/TRAINING PROGRAM

## 2025-04-01 NOTE — PROGRESS NOTES
"US guided left neck mass fine needle aspiration done by Dr. Owens, stated as \"residual thyroid tissue\"; NON-SEDATION (no H&P required as this is a NON SEDATION procedure) left anterior aspect of neck access site, dressing CDI; 3 samples in 1 jar of cytolyt obtained, 1 sample in 1 vial afirma obtained and sent to lab. Pt tolerated the procedure well. Pt hemodynamically stable pre/intra/post procedure; all questions and concerns answered prior to being d/c; patient provided with appropriate education for procedure; pt d/c home.  "

## 2025-04-02 ENCOUNTER — HOSPITAL ENCOUNTER (OUTPATIENT)
Dept: LAB | Facility: MEDICAL CENTER | Age: 60
End: 2025-04-02
Attending: NURSE PRACTITIONER
Payer: COMMERCIAL

## 2025-04-02 DIAGNOSIS — M89.9 FRONTAL SKULL LESION: ICD-10-CM

## 2025-04-02 LAB — CYTOLOGY REG CYTOL: NORMAL

## 2025-04-02 PROCEDURE — 84165 PROTEIN E-PHORESIS SERUM: CPT

## 2025-04-02 PROCEDURE — 83521 IG LIGHT CHAINS FREE EACH: CPT

## 2025-04-02 PROCEDURE — 86334 IMMUNOFIX E-PHORESIS SERUM: CPT

## 2025-04-02 PROCEDURE — 82784 ASSAY IGA/IGD/IGG/IGM EACH: CPT

## 2025-04-02 PROCEDURE — 36415 COLL VENOUS BLD VENIPUNCTURE: CPT

## 2025-04-02 PROCEDURE — 84156 ASSAY OF PROTEIN URINE: CPT

## 2025-04-02 PROCEDURE — 84155 ASSAY OF PROTEIN SERUM: CPT

## 2025-04-03 ENCOUNTER — RESULTS FOLLOW-UP (OUTPATIENT)
Facility: MEDICAL CENTER | Age: 60
End: 2025-04-03
Payer: COMMERCIAL

## 2025-04-07 LAB
ALBUMIN 24H MFR UR ELPH: 68.2 %
ALPHA1 GLOB 24H MFR UR ELPH: 22 %
ALPHA2 GLOB 24H MFR UR ELPH: 9.8 %
B-GLOBULIN 24H MFR UR ELPH: 0 %
COLLECT DURATION TIME SPEC: NORMAL HR
EER MONOCLONAL PROTEIN STUDY, 24 HOUR U Q5964: NORMAL
GAMMA GLOB 24H MFR UR ELPH: 0 %
INTERPRETATION UR IFE-IMP: NORMAL
M PROTEIN 24H MFR UR ELPH: 0 %
M PROTEIN 24H UR ELPH-MRATE: NORMAL MG/24 HRS
PROT 24H UR-MRATE: 9 MG/DL
PROT 24H UR-MRATE: NORMAL G/(24.H) (ref 40–150)
SPECIMEN VOL ?TM UR: NORMAL ML

## 2025-04-08 LAB
ALBUMIN SERPL ELPH-MCNC: 4.45 G/DL (ref 3.75–5.01)
ALPHA1 GLOB SERPL ELPH-MCNC: 0.32 G/DL (ref 0.19–0.46)
ALPHA2 GLOB SERPL ELPH-MCNC: 0.83 G/DL (ref 0.48–1.05)
B-GLOBULIN SERPL ELPH-MCNC: 0.81 G/DL (ref 0.48–1.1)
EER MONOCLONAL PROTEIN AND FLC, SERUM Q5224: NORMAL
GAMMA GLOB SERPL ELPH-MCNC: 0.99 G/DL (ref 0.62–1.51)
IGA SERPL-MCNC: 180 MG/DL (ref 68–408)
IGG SERPL-MCNC: 1054 MG/DL (ref 768–1632)
IGM SERPL-MCNC: 146 MG/DL (ref 35–263)
INTERPRETATION SERPL IFE-IMP: NORMAL
INTERPRETATION SERPL IFE-IMP: NORMAL
KAPPA LC FREE SER-MCNC: 17.24 MG/L (ref 3.3–19.4)
KAPPA LC FREE/LAMBDA FREE SER NEPH: 1.4 {RATIO} (ref 0.26–1.65)
LAMBDA LC FREE SERPL-MCNC: 12.28 MG/L (ref 5.71–26.3)
MONOCLONAL PROTEIN NL11656: NORMAL G/DL
PROT SERPL-MCNC: 7.4 G/DL (ref 6.3–8.2)

## 2025-04-14 ENCOUNTER — TELEPHONE (OUTPATIENT)
Dept: HEMATOLOGY ONCOLOGY | Facility: MEDICAL CENTER | Age: 60
End: 2025-04-14
Payer: COMMERCIAL

## 2025-04-14 DIAGNOSIS — M89.9 FRONTAL SKULL LESION: ICD-10-CM

## 2025-04-14 NOTE — TELEPHONE ENCOUNTER
Patient was seen 10/24 for a complete eye exam for similiar concerns :    Patient should maybe consider keeping her initial appt if she has new concerns or pt to be added to the cancellation ? Patient has dry eyes, Cataracts and Cornea verticillata both eyes- which can all play a factor on her vision.Please reiterate the information below that was gone over with her from the provider per notes:    -- Dry eye syndrome both eyes - associated with rheumatoid arthritis; somewhat improved with the continued use of Restasis 2014 through 2022. Insurance required switch to Zattikka in 2022 for coverage, patient reported she discontinued 7/2023 due to cost.  Using artificial tears as needed.  -- Reviewed the use of artificial tears. Recommend preservative free artificial tears which can be used as often as needed for comfort. Discussed Visine versus alternative products that better resemble her own tears such as BION Tears. Refresh, Systane and other tear products that provide a better duration of relief were also discussed.   -- Would restart Restasis if patient symptoms worsen and cost is not prohibitive.     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 Contributes to increased glare symptoms.  -- Reviewed corneal verticillata and effects on vision. There is no therapy. Should the cardiologist discontinue the amiodarone this will gradually resolve.  -- Observation is indicated.    -- Nuclear sclerotic cataract both eyes - mild visual significance, best corrected visual acuity is 20/25 in each eye with myopic shift both eyes.   VOICEMAIL  1. Caller Name: Brielle Krishnamurthy                        Call Back Number: [phone] (home)       2. Message: Pt called with a question about medication, did not state what the question is.

## 2025-04-14 NOTE — TELEPHONE ENCOUNTER
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  This is very nonspecific.  Bone scan noted slight elevated activity as well.  Various differentials noted but do include plasmacytoma versus a slow-growing malignancy.  Will go ahead and start with workup including monoclonal protein studies of both urine and serum for further evaluation.     Labs completed on 4/2/2025 shows a normal SPEP pattern with immunofixation gel pattern being normal and no monoclonal protein seen.  Light chain ratio was at 1.40, normal.  UPEP shows no monoclonal proteins detected in the urine immunofixation was negative for any monoclonal free light chains.    Since this came back negative, next step will be to proceed with a CT chest, abdomen and pelvis to look for a primary source or any other bone lesions.  Spoke to patient on the phone today and she did verbalize understanding's and agreed with the plan.  I will contact patient with the results once received.       Latest Reference Range & Units 04/02/25 14:58   Immunoglobulin A 68 - 408 mg/dL 180   Immunoglobulin G 768 - 1632 mg/dL 1054   Immunoglobulin M 35 - 263 mg/dL 146   Interpretation  See Note  See Note   Albumin 3.75 - 5.01 g/dL 4.45   Gamma Globulin 0.62 - 1.51 g/dL 0.99   Alpha-1 Globulin 0.19 - 0.46 g/dL 0.32   Alpha-2 Globulin 0.48 - 1.05 g/dL 0.83   Beta Globulin 0.48 - 1.10 g/dL 0.81   Free Kappa Light Chains 3.30 - 19.40 mg/L 17.24   Free Lambda Light Chains 5.71 - 26.30 mg/L 12.28   Kappa-Lambda Ratio 0.26 - 1.65  1.40

## 2025-04-19 ENCOUNTER — APPOINTMENT (OUTPATIENT)
Dept: RADIOLOGY | Facility: MEDICAL CENTER | Age: 60
End: 2025-04-19
Attending: NURSE PRACTITIONER
Payer: COMMERCIAL

## 2025-04-29 ENCOUNTER — PATIENT MESSAGE (OUTPATIENT)
Dept: MEDICAL GROUP | Facility: PHYSICIAN GROUP | Age: 60
End: 2025-04-29

## 2025-04-29 ENCOUNTER — OFFICE VISIT (OUTPATIENT)
Dept: MEDICAL GROUP | Facility: PHYSICIAN GROUP | Age: 60
End: 2025-04-29
Payer: COMMERCIAL

## 2025-04-29 VITALS
HEIGHT: 69 IN | WEIGHT: 293 LBS | SYSTOLIC BLOOD PRESSURE: 150 MMHG | BODY MASS INDEX: 43.4 KG/M2 | TEMPERATURE: 98.3 F | OXYGEN SATURATION: 96 % | DIASTOLIC BLOOD PRESSURE: 100 MMHG | HEART RATE: 81 BPM

## 2025-04-29 DIAGNOSIS — I10 ESSENTIAL HYPERTENSION: ICD-10-CM

## 2025-04-29 DIAGNOSIS — R74.01 TRANSAMINITIS: ICD-10-CM

## 2025-04-29 DIAGNOSIS — R73.03 PREDIABETES: ICD-10-CM

## 2025-04-29 DIAGNOSIS — Z12.31 ENCOUNTER FOR SCREENING MAMMOGRAM FOR MALIGNANT NEOPLASM OF BREAST: ICD-10-CM

## 2025-04-29 DIAGNOSIS — E89.0 POSTOPERATIVE HYPOTHYROIDISM: ICD-10-CM

## 2025-04-29 DIAGNOSIS — Z12.11 SCREENING FOR COLON CANCER: ICD-10-CM

## 2025-04-29 DIAGNOSIS — I51.7 VENTRICULAR HYPERTROPHY: ICD-10-CM

## 2025-04-29 RX ORDER — LEVOTHYROXINE SODIUM 200 UG/1
200 TABLET ORAL
Qty: 60 TABLET | Refills: 0 | Status: SHIPPED | OUTPATIENT
Start: 2025-04-29

## 2025-04-29 RX ORDER — LOSARTAN POTASSIUM 100 MG/1
100 TABLET ORAL DAILY
Qty: 100 TABLET | Refills: 3 | Status: SHIPPED | OUTPATIENT
Start: 2025-04-29 | End: 2026-06-03

## 2025-04-29 ASSESSMENT — FIBROSIS 4 INDEX: FIB4 SCORE: 1.28

## 2025-04-30 NOTE — PROGRESS NOTES
Verbal consent was acquired by the patient to use [employer] ambient listening note generation during this visit yes    Subjective:     HPI:   History of Present Illness  The patient presents for evaluation of hypertension and hypothyroidism.    Hypertension  Her blood pressure management includes amlodipine, taken today around 1030 hours or 1100 hours, and losartan, typically taken in the morning. A refill of her losartan prescription is requested as she is nearing the end of her current supply. Uncertainty exists regarding the current losartan dosage, as it has not been refilled for an extended period. A transition from lisinopril to losartan followed a hospital visit, during which it was noted that the losartan dosage was higher than the previous lisinopril dosage. Losartan is reported to be effective when taken. Occasionally, an additional dose of amlodipine is taken if blood pressure remains elevated after work. Dietary modifications have been made, including reducing sugar intake, avoiding processed foods, and eliminating caffeine. Intermittent fasting is being practiced, with food consumption limited to a specific time frame and fasting for 12 to 14 hours. These changes are believed to be beneficial. Blood pressure stability is noted when abstaining from caffeine and sugar, which has been done for approximately 3 weeks. Recently, a cup of coffee was consumed, raising the question of its contribution to today's elevated blood pressure. Blood pressure occasionally spikes unexpectedly. A CT scan is planned to be scheduled. During a previoust hospital visit, an echo revealed left ventricular hypertrophy, attribute to high blood pressure, and it was indicated that this condition could be managed with appropriate treatment. A follow-up appointment with a cardiologist is being considered to assess the current status.  - Onset: Transition from lisinopril to losartan followed a hospital visit.  - Location: Blood  pressure management.  - Duration: Blood pressure stability noted for approximately 3 weeks.  - Character: Elevated blood pressure, occasional spikes, left ventricular hypertrophy.  - Alleviating/Aggravating Factors: Dietary modifications, intermittent fasting, abstaining from caffeine and sugar, occasional additional dose of amlodipine.  - Timing: Blood pressure management includes amlodipine taken today around 1030 hours or 1100 hours, losartan typically taken in the morning.  - Severity: Blood pressure stability noted when abstaining from caffeine and sugar; left ventricular hypertrophy attributed to high blood pressure.    Hypothyroidism  Adherence to the prescribed Synthroid regimen is reported, with the medication taken on an empty stomach and an hour allowed to pass before eating. Levothyroxine 1.5 mg has been taken, but an increase to 1.75 mg is believed to be necessary. The TSH level has decreased from 20.9 to 13.6 but remains above the normal range. Previously, Cytomel was taken in conjunction with Synthroid, resulting in a TSH level decrease to 2.0.     Recurrent Nosebleeds  A recurrence of nosebleeds, not experienced for several years, is reported. These episodes have been less severe than previous ones. Blood pressure was not elevated during these episodes. A history of nosebleeds occurring around May each year is noted. An ENT specialist previously identified a thin area in the nose and prescribed a steroid medication, which was effective, as no nosebleeds occurred for several years until recently. CT scans consistently show excessive mucus production.  - Onset: Recurrence of nosebleeds not experienced for several years.  - Duration: Episodes have been less severe than previous ones.  - Character: Nosebleeds, excessive mucus production.  - Alleviating Factors: Steroid medication prescribed by ENT specialist.  - Timing: History of nosebleeds occurring around May each year.  - Severity: Less severe than  "previous episodes.    Health Maintenance: mammogram ordered    Objective:     Exam:  BP (!) 150/100 (BP Location: Left arm, Patient Position: Sitting, BP Cuff Size: Adult)   Pulse 81   Temp 36.8 °C (98.3 °F) (Temporal)   Ht 1.753 m (5' 9\")   Wt (!) 137 kg (301 lb)   LMP 06/10/2014   SpO2 96%   BMI 44.45 kg/m²  Body mass index is 44.45 kg/m².    Constitutional: Alert, no distress, well-groomed.  Skin: Warm, dry, good turgor, no rashes in visible areas.  Eye: Equal, round and reactive, conjunctiva clear, lids normal.  ENMT: Lips without lesions, good dentition, moist mucous membranes.  Neck: Trachea midline, no masses, no thyromegaly.  Respiratory: Unlabored respiratory effort, no cough.  MSK: Normal gait, moves all extremities.  Neuro: Grossly non-focal.   Psych: Alert and oriented x3, normal affect and mood.      Results  Labs   - TSH level: Decreased from 20.9 to 13.6    Imaging   - Echocardiogram: 2022, Mild left ventricular hypertrophy    Assessment & Plan:     1. Essential hypertension  CBC WITH DIFFERENTIAL    Comp Metabolic Panel    REFERRAL TO CARDIOLOGY      2. Postoperative hypothyroidism  levothyroxine (SYNTHROID) 200 MCG Tab    FREE THYROXINE    TSH    TRIIDOTHYRONINE      3. Transaminitis        4. Prediabetes  HEMOGLOBIN A1C      5. Screening for colon cancer  Referral to GI for Colonoscopy      6. Ventricular hypertrophy  REFERRAL TO CARDIOLOGY          Assessment & Plan  1. Hypertension: Chronic.  - Blood pressure readings have been improved with adherence to the current medication regimen of amlodipine and losartan.  - Advised against exceeding the recommended dosage of amlodipine due to potential side effects.  - Referral to a cardiologist will be initiated for further evaluation and management.  - patient to Provide information regarding current losartan dosage for refill.  - Consider switching to a more potent angiotensin receptor blocker such as valsartan or olmesartan if blood pressure " remains uncontrolled.    2. Hypothyroidism: Chronic.  - TSH levels have not yet reached the target range.  - Discussed potential addition of Cytomel; preference expressed for increasing levothyroxine dosage to 200 mcg. Risks associated with Cytomel, including chest pain and tachycardia, were discussed.  - Increase levothyroxine dosage to 200 mcg for 2 months.  - Report any side effects such as palpitations or changes in blood pressure.  - Conduct laboratory tests prior to the next appointment.  - Consider adding Tirosint or reintroducing Cytomel if TSH levels remain elevated at the 2-month karen.    3. Epistaxis.  - Recent episodes of nosebleeds attributed to seasonal changes and dryness.  - Blood pressure was not elevated during these episodes.  - Continue using previously prescribed steroid ointment as needed.    Follow-up  - Scheduled for a follow-up visit in 2 months.          Return in about 2 months (around 6/29/2025), or if symptoms worsen or fail to improve.    I have placed the below orders and discussed them with an approved delegating provider. The MA is performing the below orders under the direction of Dr. Funes.      Please note that this dictation was created using voice recognition software. I have made every reasonable attempt to correct obvious errors, but I expect that there are errors of grammar and possibly content that I did not discover before finalizing the note.

## 2025-05-02 NOTE — Clinical Note
REFERRAL APPROVAL NOTICE         Sent on May 2, 2025                   Brielle Estrada Anisha  [address]   [address]  [place] [address]                   Dear Ms. Anisha,    After a careful review of the medical information and benefit coverage, [facility] has processed your referral. See below for additional details.    If applicable, you must be actively enrolled with your insurance for coverage of the authorized service. If you have any questions regarding your coverage, please contact your insurance directly.    REFERRAL INFORMATION   Referral #:  [id number]  Referred-To Provider    Referred-By Provider:  Gastroenterology    STEPHENIE Jim   [facility]      [address]  [place] [address] [phone] [address]  [place] [address]  [phone]    Referral Start Date:  04/29/2025  Referral End Date:   04/29/2026             SCHEDULING  If you do not already have an appointment, please call [phone] to make an appointment.     MORE INFORMATION  If you do not already have a [employer] account, sign up at: [email or web address]  You can access your medical information, make appointments, see lab results, billing information, and more.  If you have questions regarding this referral, please contact  the [facility] Referrals department at:             [phone]. Monday - Friday 8:00AM - 5:00PM.     Sincerely,    [facility]

## 2025-07-10 ENCOUNTER — APPOINTMENT (OUTPATIENT)
Dept: MEDICAL GROUP | Facility: PHYSICIAN GROUP | Age: 60
End: 2025-07-10
Payer: COMMERCIAL

## 2025-07-16 ENCOUNTER — APPOINTMENT (OUTPATIENT)
Dept: MEDICAL GROUP | Facility: PHYSICIAN GROUP | Age: 60
End: 2025-07-16
Payer: COMMERCIAL

## 2025-08-29 ENCOUNTER — HOSPITAL ENCOUNTER (OUTPATIENT)
Dept: RADIOLOGY | Facility: MEDICAL CENTER | Age: 60
End: 2025-08-29
Attending: NURSE PRACTITIONER
Payer: COMMERCIAL

## 2025-08-29 DIAGNOSIS — M89.9 FRONTAL SKULL LESION: ICD-10-CM

## 2025-08-29 PROCEDURE — 700117 HCHG RX CONTRAST REV CODE 255: Performed by: NURSE PRACTITIONER

## 2025-08-29 PROCEDURE — 71260 CT THORAX DX C+: CPT

## 2025-08-29 RX ADMIN — IOHEXOL 100 ML: 350 INJECTION, SOLUTION INTRAVENOUS at 17:00
